# Patient Record
Sex: MALE | Race: WHITE
[De-identification: names, ages, dates, MRNs, and addresses within clinical notes are randomized per-mention and may not be internally consistent; named-entity substitution may affect disease eponyms.]

---

## 2018-05-05 ENCOUNTER — HOSPITAL ENCOUNTER (EMERGENCY)
Dept: HOSPITAL 7 - FB.ED | Age: 83
Discharge: HOME | End: 2018-05-05
Payer: MEDICARE

## 2018-05-05 VITALS — DIASTOLIC BLOOD PRESSURE: 73 MMHG | SYSTOLIC BLOOD PRESSURE: 141 MMHG

## 2018-05-05 DIAGNOSIS — J86.9: ICD-10-CM

## 2018-05-05 DIAGNOSIS — T82.7XXA: Primary | ICD-10-CM

## 2018-05-05 DIAGNOSIS — Z88.0: ICD-10-CM

## 2018-05-05 DIAGNOSIS — Z79.899: ICD-10-CM

## 2018-05-05 DIAGNOSIS — Z87.891: ICD-10-CM

## 2018-05-05 NOTE — EDM.PDOC
ED HPI GENERAL MEDICAL PROBLEM





- General


Chief Complaint: Skin Complaint


Stated Complaint: DRAINAGE ON CHEST


Time Seen by Provider: 05/05/18 11:40


Source of Information: Reports: Patient


History Limitations: Reports: No Limitations





- History of Present Illness


INITIAL COMMENTS - FREE TEXT/NARRATIVE: 





Alvin comes into [facility] ED with new onset drainage from a wound to R anterior 

chest. He had a Pacemaker/Defib placed in [place] about a month ago, and noticed 

some drainage this am on his shirt. There is no pain, tenderness or odor. 

Drainage appears blood tinged. Inspection reveals turbid blood tinged drainage 

from the corner of the wound, and a WC was obtained. 





- Related Data


 Allergies











Allergy/AdvReac Type Severity Reaction Status Date / Time


 


Penicillins Allergy  Hives Verified 05/05/18 11:10











Home Meds: 


 Home Meds





Aspirin [Ecotrin] 81 mg PO BEDTIME 05/27/15 [History]


Carvedilol 12.5 mg PO BID 05/27/15 [History]


Furosemide [Lasix] 20 mg PO DAILY #30 tablet 05/27/15 [Rx]


Isosorbide Mononitrate [Imdur] 30 mg PO DAILY 05/27/15 [History]


Losartan [Cozaar] 25 mg PO BEDTIME 05/27/15 [History]


Omega-3 Fatty Acids [Fish Oil] 2 tab PO BID 05/27/15 [History]


Rosuvastatin [Crestor] 20 mg PO BEDTIME 05/27/15 [History]


Spironolactone [Aldactone] 12.5 mg PO DAILY 05/27/15 [History]


Clindamycin HCl [Cleocin HCl] 300 mg PO Q6HR #20 capsule 05/05/18 [Rx]











Past Medical History





- Past Health History


Medical/Surgical History: Denies Medical/Surgical History


Other Genitourinary History: kidney disease





- Past Surgical History


Other Cardiovascular Surgeries/Procedures: 1 stent, triple bypass





Social & Family History





- Tobacco Use


Smoking Status *Q: Former Smoker


Years of Tobacco use: 28





- Alcohol Use


Days Per Week of Alcohol Use: 7


Number of Drinks Per Day: 1


Total Drinks Per Week: 7





- Recreational Drug Use


Recreational Drug Use: No





ED ROS GENERAL





- Review of Systems


Review Of Systems: ROS reveals no pertinent complaints other than HPI.





ED EXAM, SKIN/RASH


Exam: See Below


Exam Limited By: No Limitations


General Appearance: Alert, WD/WN, No Apparent Distress


Head: Normocephalic


Neck: Normal Inspection, Supple, Non-Tender, Full Range of Motion


Respiratory/Chest: No Respiratory Distress, Lungs Clear, Normal Breath Sounds, 

No Accessory Muscle Use, Chest Non-Tender, Other (Pacemaker wound R upper chest 

with a draining small wound from pocket, nontender, no erythema, additional 

turbid fluid was removed manually)


Cardiovascular: Normal Peripheral Pulses, Regular Rate, Rhythm, No Murmur


GI/Abdominal: Normal Bowel Sounds, Soft, Non-Tender, No Organomegaly, No 

Distention, No Mass


 (Male) Exam: Deferred


Rectal (Males) Exam: Deferred


Back Exam: Normal Inspection


Extremities: Normal Inspection


Neurological: Alert, Oriented, CN II-XII Intact, Normal Cognition, Normal Gait, 

No Motor/Sensory Deficits


Psychiatric: Normal Affect, Normal Mood


Skin: Warm, Dry, Wound/Incision (see annotation above)





Course





- Vital Signs


Text/Narrative:: 





Following manual removal of turbid blood tinged fluid from wound, a dressing 

was applied. 


Last Recorded V/S: 





 Last Vital Signs











Temp  36.1 C   05/05/18 11:12


 


Pulse  63   05/05/18 11:12


 


Resp  16   05/05/18 11:12


 


BP  141/73 H  05/05/18 11:12


 


Pulse Ox  99   05/05/18 11:12














Departure





- Departure


Time of Disposition: 12:13


Disposition: Home, Self-Care 01


Condition: Fair


Clinical Impression: 


 Abscess








- Discharge Information


Prescriptions: 


Clindamycin HCl [Cleocin HCl] 300 mg PO Q6HR #20 capsule


Referrals: 


Strand,Duane, MD [Primary Care Provider] - 





- Problem List & Annotations


(1) Abscess


SNOMED Code(s): 751811597


   Code(s): L02.91 - CUTANEOUS ABSCESS, UNSPECIFIED   Status: Acute   Current 

Visit: Yes   Annotation/Comment:: Mr Antunez has a small abscess involving the 

surgical pocket created for his new Pacemaker/Defib. He is 'allergic' to 

Augmentin. I dispensed Clindamycin 300mg cap q6 hrs, and advised a call to 

Cardiology on Monday for follow up. He may return to ED if any issues with 

swelling, tenderness, fever, or constitutional sxs related to antibx.    





- Problem List Review


Problem List Initiated/Reviewed/Updated: Yes





- Assessment/Plan


Plan: 





Follow up with Cardiology on Monday.

## 2018-09-04 ENCOUNTER — HOSPITAL ENCOUNTER (EMERGENCY)
Dept: HOSPITAL 7 - FB.ED | Age: 83
LOS: 1 days | Discharge: OTHER INSTITUTION | End: 2018-09-05
Payer: MEDICARE

## 2018-09-04 VITALS — DIASTOLIC BLOOD PRESSURE: 71 MMHG | SYSTOLIC BLOOD PRESSURE: 144 MMHG

## 2018-09-04 DIAGNOSIS — Z88.0: ICD-10-CM

## 2018-09-04 DIAGNOSIS — Z79.899: ICD-10-CM

## 2018-09-04 DIAGNOSIS — I21.4: Primary | ICD-10-CM

## 2018-09-04 PROCEDURE — 80048 BASIC METABOLIC PNL TOTAL CA: CPT

## 2018-09-04 PROCEDURE — 36415 COLL VENOUS BLD VENIPUNCTURE: CPT

## 2018-09-04 PROCEDURE — 71045 X-RAY EXAM CHEST 1 VIEW: CPT

## 2018-09-04 PROCEDURE — 85025 COMPLETE CBC W/AUTO DIFF WBC: CPT

## 2018-09-04 PROCEDURE — 93005 ELECTROCARDIOGRAM TRACING: CPT

## 2018-09-04 PROCEDURE — 84484 ASSAY OF TROPONIN QUANT: CPT

## 2018-09-04 PROCEDURE — 99285 EMERGENCY DEPT VISIT HI MDM: CPT

## 2018-09-04 PROCEDURE — 85610 PROTHROMBIN TIME: CPT

## 2018-09-04 RX ADMIN — Medication PRN ML: at 23:10

## 2018-09-04 NOTE — EDM.PDOC
ED HPI GENERAL MEDICAL PROBLEM





- General


Chief Complaint: Chest Pain


Stated Complaint: CHESTPAIN


Time Seen by Provider: 09/04/18 22:00


Source of Information: Reports: Patient


History Limitations: Reports: No Limitations





- History of Present Illness


INITIAL COMMENTS - FREE TEXT/NARRATIVE: 





Alvin comes into [facility] ED with a hx of L precordial chest pains for less than 

10 minutes that occurred about an hour ago while sitting in a chair watching 

TV. Pains were nonradiating, not associated with SOB, palpitations, sweats, 

dizziness, or GI upset. He has been pain free since arrival by car. He was 

given  mg po, and an IV was inserted. A 12 lead ekg notes paced rhythm 

without new changes. 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 He has 

taken no meds. 





- Related Data


 Allergies











Allergy/AdvReac Type Severity Reaction Status Date / Time


 


Penicillins Allergy  Hives Verified 09/04/18 21:53











Home Meds: 


 Home Meds





Carvedilol 12.5 mg PO BID 05/27/15 [History]


Furosemide [Lasix] 20 mg PO DAILY #30 tablet 05/27/15 [Rx]


Isosorbide Mononitrate [Imdur] 30 mg PO DAILY 05/27/15 [History]


Losartan [Cozaar] 25 mg PO BEDTIME 05/27/15 [History]


Omega-3 Fatty Acids [Fish Oil] 2 tab PO BID 05/27/15 [History]


Rosuvastatin [Crestor] 20 mg PO BEDTIME 05/27/15 [History]


Warfarin Sodium [Jantoven] 5 mg PO DAILY 09/04/18 [History]


Zolpidem [Ambien] 2.5 mg PO BEDTIME 09/04/18 [History]


traMADol HCl [Tramadol HCl] 50 mg PO BEDTIME 09/04/18 [History]











Past Medical History





- Past Health History


Medical/Surgical History: Denies Medical/Surgical History


HEENT History: Reports: Impaired Vision


Other HEENT History: wears glasses


Cardiovascular History: Reports: Heart Failure, High Cholesterol, Hypertension, 

MI


Other Genitourinary History: kidney disease





- Infectious Disease History


Infectious Disease History: Reports: Chicken Pox, Measles, Mumps





- Past Surgical History


Other Cardiovascular Surgeries/Procedures: 1 stent, triple bypass





Social & Family History





- Family History


Family Medical History: Noncontributory





- Caffeine Use


Caffeine Use: Reports: Coffee





ED ROS GENERAL





- Review of Systems


Review Of Systems: See Below


Constitutional: Reports: No Symptoms


HEENT: Reports: No Symptoms


Respiratory: Reports: No Symptoms


Cardiovascular: Reports: Chest Pain


Endocrine: Reports: No Symptoms


GI/Abdominal: Reports: No Symptoms


: Reports: No Symptoms


Musculoskeletal: Reports: No Symptoms


Skin: Reports: No Symptoms


Neurological: Reports: No Symptoms


Psychiatric: Reports: No Symptoms


Hematologic/Lymphatic: Reports: No Symptoms


Immunologic: Reports: No Symptoms





ED EXAM, GENERAL





- Physical Exam


Exam: See Below


Exam Limited By: No Limitations


General Appearance: Alert, WD/WN, No Apparent Distress


Eye Exam: Bilateral Eye: EOMI, Normal Inspection, PERRL


Ears: Normal External Exam


Nose: Normal Inspection


Throat/Mouth: Normal Inspection, Normal Lips, Normal Teeth, Normal Gums, Normal 

Oropharynx, Normal Voice, No Airway Compromise


Head: Normocephalic


Neck: Normal Inspection, Supple, Non-Tender, Full Range of Motion


Respiratory/Chest: No Respiratory Distress, Lungs Clear, Normal Breath Sounds, 

No Accessory Muscle Use, Chest Non-Tender


Cardiovascular: Regular Rate, Rhythm, No Edema, No Gallop, No JVD, No Murmur


GI/Abdominal: Normal Bowel Sounds, Soft, Non-Tender, No Organomegaly, No 

Distention, No Mass


 (Male) Exam: Deferred


Rectal (Males) Exam: Deferred


Back Exam: Normal Inspection


Extremities: Normal Inspection


Neurological: Alert, Oriented, CN II-XII Intact, Normal Cognition, Normal Gait, 

No Motor/Sensory Deficits


Psychiatric: Normal Affect, Normal Mood


Skin Exam: Warm, Dry, Intact, Normal Color, No Rash


Lymphatic: No Adenopathy





Course





- Vital Signs


Text/Narrative:: 





Following assessment at the [facility] ED, an IV was started in the LUE. A 12 lead 

ekg noted paced rhythm,  mg po, port chest x ray noting some cardiac 

enlargement, pacemaker, no active infiltrates. The CBC was baseline, Troponin I 

0.062, BUN 28, Cr 1.0. I discussed case with Dr Cooney at [facility] who 

accepted care of the patient. 


Last Recorded V/S: 


 Last Vital Signs











Temp      


 


Pulse  60   09/04/18 21:48


 


Resp  15   09/04/18 21:48


 


BP  127/59 L  09/04/18 21:48


 


Pulse Ox  100   09/04/18 21:48














- Orders/Labs/Meds


Orders: 


 Active Orders 24 hr











 Category Date Time Status


 


 EKG Documentation Completion [RC] ASDIRECTED Care  09/04/18 22:03 Active


 


 Chest 1V Frontal [CR] Stat Exams  09/04/18 22:03 Taken


 


 Sodium Chloride 0.9% [Saline Flush] Med  09/04/18 23:02 Active





 10 ml FLUSH ASDIRECTED PRN   


 


 Peripheral IV Insertion Adult [OM.PC] Routine Oth  09/04/18 23:02 Ordered


 


 EKG 12 Lead [EK] Routine Ther  09/04/18 22:03 Ordered








 Medication Orders





Sodium Chloride (Saline Flush)  10 ml FLUSH ASDIRECTED PRN


   PRN Reason: Keep Vein Open


   Last Admin: 09/04/18 23:10 Dose:  10 ml








Labs: 


 Laboratory Tests











  09/04/18 09/04/18 09/04/18 Range/Units





  22:03 22:10 22:10 


 


WBC   5.9   (4.5-12.0)  X10-3/uL


 


RBC   4.04 L   (4.30-5.75)  x10(6)uL


 


Hgb   13.5   (11.5-15.5)  g/dL


 


Hct   39.5   (30.0-51.3)  %


 


MCV   97.8 H   (80-96)  fL


 


MCH   33.4   (27.7-33.6)  pg


 


MCHC   34.2   (32.2-35.4)  g/dL


 


RDW   14.4   (11.5-15.5)  %


 


Plt Count   151   (125-369)  X10(3)uL


 


MPV   8.5   (7.4-10.4)  fL


 


Neut % (Auto)   69.1   (46-82)  %


 


Lymph % (Auto)   20.4   (13-37)  %


 


Mono % (Auto)   6.9   (4-12)  %


 


Eos % (Auto)   3   (1.0-5.0)  %


 


Baso % (Auto)   1   (0-2)  %


 


Neut # (Auto)   4.1   (1.6-8.3)  #


 


Lymph # (Auto)   1.2   (0.6-5.0)  #


 


Mono # (Auto)   0.4   (0.0-1.3)  #


 


Eos # (Auto)   0.2   (0.0-0.8)  #


 


Baso # (Auto)   0.0   (0.0-0.2)  #


 


Sodium    136  (135-145)  mmol/L


 


Potassium    4.1  (3.5-5.3)  mmol/L


 


Chloride    103  (100-110)  mmol/L


 


Carbon Dioxide    27  (21-32)  mmol/L


 


BUN    28 H  (7-18)  mg/dL


 


Creatinine    1.0  (0.70-1.30)  mg/dL


 


Est Cr Clr Drug Dosing    53.20  mL/min


 


Estimated GFR (MDRD)    > 60  (>60)  


 


BUN/Creatinine Ratio    28.0 H  (9-20)  


 


Glucose    133 H  ()  mg/dL


 


Calcium    8.5 L  (8.6-10.2)  mg/dL


 


Troponin I  0.062 H    (<0.017-0.056)  ng/mL











Meds: 


Medications











Generic Name Dose Route Start Last Admin





  Trade Name Freq  PRN Reason Stop Dose Admin


 


Sodium Chloride  10 ml  09/04/18 23:02  09/04/18 23:10





  Saline Flush  FLUSH   10 ml





  ASDIRECTED PRN   Administration





  Keep Vein Open   





     





     





     














Discontinued Medications














Generic Name Dose Route Start Last Admin





  Trade Name Freq  PRN Reason Stop Dose Admin


 


Aspirin  324 mg  09/04/18 23:04  09/04/18 23:10





  Aspirin  PO  09/04/18 23:05  324 mg





  ONETIME ONE   Administration





     





     





     





     














Departure





- Departure


Time of Disposition: 23:25


Disposition: DC/Tfer to Other 70


Reason for Transfer *Q: Primary PCI Indicated


Condition: Fair


Clinical Impression: 


 Non-STEMI (non-ST elevated myocardial infarction)





Referrals: 


Strand,Duane, MD [Primary Care Provider] - 


Forms:  ED Department Discharge





- Problem List & Annotations


(1) Non-STEMI (non-ST elevated myocardial infarction)


SNOMED Code(s): 134137942


   Code(s): I21.4 - NON-ST ELEVATION (NSTEMI) MYOCARDIAL INFARCTION   Status: 

Acute   Current Visit: Yes   Annotation/Comment:: Mr Manriquez will be 

transferred to [facility] by ground ambulance tonight.    





- Problem List Review


Problem List Initiated/Reviewed/Updated: Yes





- My Orders


Last 24 Hours: 


My Active Orders





09/04/18 22:03


EKG Documentation Completion [RC] ASDIRECTED 


Chest 1V Frontal [CR] Stat 


EKG 12 Lead [EK] Routine 





09/04/18 23:02


Sodium Chloride 0.9% [Saline Flush]   10 ml FLUSH ASDIRECTED PRN 


Peripheral IV Insertion Adult [OM.PC] Routine 














- Assessment/Plan


Last 24 Hours: 


My Active Orders





09/04/18 22:03


EKG Documentation Completion [RC] ASDIRECTED 


Chest 1V Frontal [CR] Stat 


EKG 12 Lead [EK] Routine 





09/04/18 23:02


Sodium Chloride 0.9% [Saline Flush]   10 ml FLUSH ASDIRECTED PRN 


Peripheral IV Insertion Adult [OM.PC] Routine 











Plan: 





Follow up with Cardiology.

## 2018-09-05 NOTE — CR
INDICATION:  Atypical chest pain.



CHEST:  An AP portable upright view of the chest, 09/04/2018, was compared with 
05/27/2015 and again revealed the heart to be enlarged with bipolar pacemaker 
leads unchanged in position.  



The aorta is tortuous and calcified.  Evidence of previous median sternotomy is 
again noted.  



Diminished bone density is noted, compatible with osteoporosis.  



Markings appear similar to the previous examination with no definite active 
infiltrate or effusion.  However, minimal pneumonia and pleuritis cannot be 
excluded at the left costophrenic angle with increased density in that area.  



Overlying EKG leads are noted. 



Lungs appear to be somewhat hyperaerated with mildly flattened diaphragm leaf, 
raising question of COPD.   



Upper lung field pulmonary vasculature is minimally prominent and somewhat 
indistinct, raising question of a mild or early CHF.  This should be correlated 
clinically - PA and lateral views of the chest with full inspiration may be 
helpful for further evaluation.  



IMPRESSION:

1.  No definite acute process but difficult to exclude pneumonia and pleuritis 
at the left costophrenic angle.  

2.  Minimal or early CHF is difficult to exclude but not definite.  

3.  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 
sternotomy.  

MTDD

## 2018-09-20 ENCOUNTER — HOSPITAL ENCOUNTER (EMERGENCY)
Dept: HOSPITAL 7 - FB.ED | Age: 83
Discharge: HOME | End: 2018-09-20
Payer: MEDICARE

## 2018-09-20 VITALS — DIASTOLIC BLOOD PRESSURE: 70 MMHG | SYSTOLIC BLOOD PRESSURE: 123 MMHG

## 2018-09-20 DIAGNOSIS — I50.9: ICD-10-CM

## 2018-09-20 DIAGNOSIS — Z95.0: ICD-10-CM

## 2018-09-20 DIAGNOSIS — Z88.0: ICD-10-CM

## 2018-09-20 DIAGNOSIS — Z79.01: ICD-10-CM

## 2018-09-20 DIAGNOSIS — E78.00: ICD-10-CM

## 2018-09-20 DIAGNOSIS — T45.515A: ICD-10-CM

## 2018-09-20 DIAGNOSIS — I11.0: Primary | ICD-10-CM

## 2018-09-20 DIAGNOSIS — Z79.899: ICD-10-CM

## 2018-09-20 DIAGNOSIS — D68.32: ICD-10-CM

## 2018-09-20 DIAGNOSIS — Z87.891: ICD-10-CM

## 2018-09-20 NOTE — EDM.PDOC
ED HPI GENERAL MEDICAL PROBLEM





- General


Chief Complaint: Respiratory Problem


Stated Complaint: SOB


Time Seen by Provider: 09/20/18 10:36


Source of Information: Reports: Patient


History Limitations: Reports: No Limitations





- History of Present Illness


INITIAL COMMENTS - FREE TEXT/NARRATIVE: 





Presents with exertional dyspnea x 1 month. Has a h/o heart failure, took an 

extra Lasix this morning (40mg total). Patient had a cardiac work up at [facility] @2 weeks ago for NSTEMI, negative coronary angio per patient. Coreg was 

increased. Denies chest pain. Patient called [facility] this morning 

because exertional SOB has not improved and they referred him here. Patient 

states symptoms have not worsened. No dyspnea at rest.


Per [facility] med records:


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 

to LAD, SVG to OM1, SVG to PDA), patent stent in LM to LCX.


Transthoracic Echocardiogram (9/5/18): EF decreased from 40% to 35%, PAH 

increased from 24mm Hg to 50mm Hg (compared to 6/4/15 Echo).


Duration: Week(s): (4)


Severity: Moderate


Associated Symptoms: Denies: Chest Pain, Cough





- Related Data


 Allergies











Allergy/AdvReac Type Severity Reaction Status Date / Time


 


Penicillins Allergy  Hives Verified 09/20/18 10:30











Home Meds: 


 Home Meds





Carvedilol 12.5 mg PO BID 05/27/15 [History]


Furosemide [Lasix] 20 mg PO DAILY #30 tablet 05/27/15 [Rx]


Isosorbide Mononitrate [Imdur] 30 mg PO DAILY 05/27/15 [History]


Omega-3 Fatty Acids [Fish Oil] 2 tab PO BID 05/27/15 [History]


Rosuvastatin [Crestor] 20 mg PO BEDTIME 05/27/15 [History]


Warfarin Sodium [Jantoven] 5 mg PO SUTUWETHSA 09/04/18 [History]


Zolpidem [Ambien] 2.5 mg PO BEDTIME 09/04/18 [History]


traMADol HCl [Tramadol HCl] 50 mg PO BEDTIME 09/04/18 [History]


Furosemide 20 mg PO DAILY PRN 09/20/18 [History]


Warfarin Sodium [Jantoven] 2.5 mg PO MOFR 09/20/18 [History]











Past Medical History


HEENT History: Reports: Impaired Vision


Other HEENT History: wears glasses


Cardiovascular History: Reports: Automatic Implantable Cardioverter 

Defibrillators, Bypass (3V CABG 1980's), Heart Failure, High Cholesterol, 

Hypertension, MI, Pacemaker


Other Genitourinary History: kidney disease





- Infectious Disease History


Infectious Disease History: Reports: Chicken Pox, Measles, Mumps





- Past Surgical History


Other Cardiovascular Surgeries/Procedures: 1 stent, triple bypass





Social & Family History





- Family History


Family Medical History: Noncontributory





- Tobacco Use


Smoking Status *Q: Former Smoker


Tobacco Use Within Last Twelve Months: No





- Caffeine Use


Caffeine Use: Reports: Coffee





- Alcohol Use


Alcohol Use History: Yes


Alcohol Use Frequency: Socially





ED ROS GENERAL





- Review of Systems


Review Of Systems: ROS reveals no pertinent complaints other than HPI.





ED EXAM, GENERAL





- Physical Exam


Exam: See Below


Exam Limited By: No Limitations


General Appearance: Alert, WD/WN, No Apparent Distress


Ears: Normal External Exam


Nose: Normal Inspection


Throat/Mouth: No Airway Compromise


Head: Atraumatic, Normocephalic


Neck: Full Range of Motion


Respiratory/Chest: No Respiratory Distress, Lungs Clear, Normal Breath Sounds


Cardiovascular: Regular Rate, Rhythm, No Murmur


GI/Abdominal: No Distention


Back Exam: Full Range of Motion


Extremities: Normal Range of Motion


Neurological: Alert, Oriented, Normal Cognition, No Motor/Sensory Deficits


Psychiatric: Normal Affect, Normal Mood


Skin Exam: Warm, Dry, Intact





EKG INTERPRETATION


EKG Date: 09/20/18


Time: 10:39


Rhythm: Other (paced)


Rate (Beats/Min): 75


EKG Interpretation Comments: 





Ventricular-paced rhythm





Course





- Vital Signs


Last Recorded V/S: 


 Last Vital Signs











Temp  36.3 C   09/20/18 10:03


 


Pulse  66   09/20/18 12:02


 


Resp  16   09/20/18 12:02


 


BP  139/71   09/20/18 12:02


 


Pulse Ox  99   09/20/18 12:02














- Orders/Labs/Meds


Orders: 


 Active Orders 24 hr











 Category Date Time Status


 


 CXR [Chest 2V] [CR] Stat Exams  09/20/18 10:34 Taken


 


 EKG 12 Lead [EK] Stat Ther  09/20/18 10:34 Ordered











Labs: 


 Laboratory Tests











  09/20/18 09/20/18 09/20/18 Range/Units





  10:44 10:44 10:44 


 


WBC  7.1    (4.5-12.0)  X10-3/uL


 


RBC  3.90 L    (4.30-5.75)  x10(6)uL


 


Hgb  12.9    (11.5-15.5)  g/dL


 


Hct  38.5    (30.0-51.3)  %


 


MCV  98.6 H    (80-96)  fL


 


MCH  33.0    (27.7-33.6)  pg


 


MCHC  33.4    (32.2-35.4)  g/dL


 


RDW  14.0    (11.5-15.5)  %


 


Plt Count  217    (125-369)  X10(3)uL


 


MPV  8.1    (7.4-10.4)  fL


 


Neut % (Auto)  78.2    (46-82)  %


 


Lymph % (Auto)  11.6 L    (13-37)  %


 


Mono % (Auto)  8.2    (4-12)  %


 


Eos % (Auto)  2    (1.0-5.0)  %


 


Baso % (Auto)  0    (0-2)  %


 


Neut # (Auto)  5.6    (1.6-8.3)  #


 


Lymph # (Auto)  0.8    (0.6-5.0)  #


 


Mono # (Auto)  0.6    (0.0-1.3)  #


 


Eos # (Auto)  0.1    (0.0-0.8)  #


 


Baso # (Auto)  0.0    (0.0-0.2)  #


 


PT   37.5 H*   (8.7-11.1)  


 


INR   3.92 H   (0.89-1.13)  


 


D-Dimer, Quantitative     (0.0-0.59)  mg/LFEU


 


Sodium    137  (135-145)  mmol/L


 


Potassium    3.9  (3.5-5.3)  mmol/L


 


Chloride    101  (100-110)  mmol/L


 


Carbon Dioxide    30  (21-32)  mmol/L


 


BUN    23 H  (7-18)  mg/dL


 


Creatinine    1.1  (0.70-1.30)  mg/dL


 


Est Cr Clr Drug Dosing    48.36  mL/min


 


Estimated GFR (MDRD)    > 60  (>60)  


 


BUN/Creatinine Ratio    20.9 H  (9-20)  


 


Glucose    98  ()  mg/dL


 


Calcium    8.4 L  (8.6-10.2)  mg/dL


 


Total Bilirubin    0.7  (0.1-1.3)  mg/dL


 


AST    26 H  (5-25)  IU/L


 


ALT    48 H  (12-36)  U/L


 


Alkaline Phosphatase    91  ()  IU/L


 


Troponin I     (<0.017-0.056)  ng/mL


 


NT-Pro-B Natriuret Pep     (<=450)  pg/mL


 


Total Protein    6.3  (6.0-8.0)  g/dL


 


Albumin    3.0 L  (3.2-4.6)  g/dL


 


Globulin    3.3  g/dL


 


Albumin/Globulin Ratio    0.9  














  09/20/18 09/20/18 09/20/18 Range/Units





  10:44 10:44 12:43 


 


WBC     (4.5-12.0)  X10-3/uL


 


RBC     (4.30-5.75)  x10(6)uL


 


Hgb     (11.5-15.5)  g/dL


 


Hct     (30.0-51.3)  %


 


MCV     (80-96)  fL


 


MCH     (27.7-33.6)  pg


 


MCHC     (32.2-35.4)  g/dL


 


RDW     (11.5-15.5)  %


 


Plt Count     (125-369)  X10(3)uL


 


MPV     (7.4-10.4)  fL


 


Neut % (Auto)     (46-82)  %


 


Lymph % (Auto)     (13-37)  %


 


Mono % (Auto)     (4-12)  %


 


Eos % (Auto)     (1.0-5.0)  %


 


Baso % (Auto)     (0-2)  %


 


Neut # (Auto)     (1.6-8.3)  #


 


Lymph # (Auto)     (0.6-5.0)  #


 


Mono # (Auto)     (0.0-1.3)  #


 


Eos # (Auto)     (0.0-0.8)  #


 


Baso # (Auto)     (0.0-0.2)  #


 


PT     (8.7-11.1)  


 


INR     (0.89-1.13)  


 


D-Dimer, Quantitative   0.84 H   (0.0-0.59)  mg/LFEU


 


Sodium     (135-145)  mmol/L


 


Potassium     (3.5-5.3)  mmol/L


 


Chloride     (100-110)  mmol/L


 


Carbon Dioxide     (21-32)  mmol/L


 


BUN     (7-18)  mg/dL


 


Creatinine     (0.70-1.30)  mg/dL


 


Est Cr Clr Drug Dosing     mL/min


 


Estimated GFR (MDRD)     (>60)  


 


BUN/Creatinine Ratio     (9-20)  


 


Glucose     ()  mg/dL


 


Calcium     (8.6-10.2)  mg/dL


 


Total Bilirubin     (0.1-1.3)  mg/dL


 


AST     (5-25)  IU/L


 


ALT     (12-36)  U/L


 


Alkaline Phosphatase     ()  IU/L


 


Troponin I  0.100 H*   0.097 H*  (<0.017-0.056)  ng/mL


 


NT-Pro-B Natriuret Pep  4156 H*    (<=450)  pg/mL


 


Total Protein     (6.0-8.0)  g/dL


 


Albumin     (3.2-4.6)  g/dL


 


Globulin     g/dL


 


Albumin/Globulin Ratio     














- Radiology Interpretation


Free Text/Narrative:: 





CXR: New mild linear density in the left lateral lower lung, probably 

atelectasis





- Re-Assessments/Exams


Free Text/Narrative Re-Assessment/Exam: 





09/20/18 13:31


Patient feels better.





Departure





- Departure


Time of Disposition: 13:31


Disposition: Home, Self-Care 01


Condition: Good


Clinical Impression: 


 Warfarin-induced coagulopathy





CHF exacerbation


Qualifiers:


 Heart failure type: unspecified Qualified Code(s): I50.9 - Heart failure, 

unspecified








- Discharge Information


*PRESCRIPTION DRUG MONITORING PROGRAM REVIEWED*: No


*COPY OF PRESCRIPTION DRUG MONITORING REPORT IN PATIENT BRENDAN: Not Applicable


Instructions:  Warfarin Coagulopathy, Heart Failure, Easy-to-Read


Referrals: 


Strand,Duane, MD [Primary Care Provider] - 


Forms:  ED Department Discharge


Additional Instructions: 


Hold Coumadin today, call Coumadin clinic today for further dosing 

instructions. Increase Lasix to 40mg daily. Follow up with your primary 

physician in 1-2 days. Return to the ER as needed.





- My Orders


Last 24 Hours: 


My Active Orders





09/20/18 10:34


CXR [Chest 2V] [CR] Stat 


EKG 12 Lead [EK] Stat 














- Assessment/Plan


Last 24 Hours: 


My Active Orders





09/20/18 10:34


CXR [Chest 2V] [CR] Stat 


EKG 12 Lead [EK] Stat

## 2019-09-23 ENCOUNTER — HOSPITAL ENCOUNTER (INPATIENT)
Dept: HOSPITAL 7 - FB.MS | Age: 84
LOS: 3 days | Discharge: HOME HEALTH | DRG: 559 | End: 2019-09-26
Attending: FAMILY MEDICINE | Admitting: FAMILY MEDICINE
Payer: MEDICARE

## 2019-09-23 DIAGNOSIS — I95.81: ICD-10-CM

## 2019-09-23 DIAGNOSIS — I25.10: ICD-10-CM

## 2019-09-23 DIAGNOSIS — I25.5: ICD-10-CM

## 2019-09-23 DIAGNOSIS — Z95.5: ICD-10-CM

## 2019-09-23 DIAGNOSIS — Z95.1: ICD-10-CM

## 2019-09-23 DIAGNOSIS — Z96.641: ICD-10-CM

## 2019-09-23 DIAGNOSIS — I13.0: ICD-10-CM

## 2019-09-23 DIAGNOSIS — J44.9: ICD-10-CM

## 2019-09-23 DIAGNOSIS — Z90.89: ICD-10-CM

## 2019-09-23 DIAGNOSIS — E78.5: ICD-10-CM

## 2019-09-23 DIAGNOSIS — Z87.891: ICD-10-CM

## 2019-09-23 DIAGNOSIS — R53.1: ICD-10-CM

## 2019-09-23 DIAGNOSIS — N18.3: ICD-10-CM

## 2019-09-23 DIAGNOSIS — D62: ICD-10-CM

## 2019-09-23 DIAGNOSIS — I50.41: ICD-10-CM

## 2019-09-23 DIAGNOSIS — Z47.1: Primary | ICD-10-CM

## 2019-09-23 DIAGNOSIS — Z79.899: ICD-10-CM

## 2019-09-23 DIAGNOSIS — Z88.1: ICD-10-CM

## 2019-09-23 DIAGNOSIS — Z95.810: ICD-10-CM

## 2019-09-23 DIAGNOSIS — Z79.82: ICD-10-CM

## 2019-09-23 DIAGNOSIS — I25.2: ICD-10-CM

## 2019-09-23 DIAGNOSIS — I48.2: ICD-10-CM

## 2019-09-23 DIAGNOSIS — Z88.8: ICD-10-CM

## 2019-09-23 DIAGNOSIS — E78.00: ICD-10-CM

## 2019-09-23 DIAGNOSIS — H54.7: ICD-10-CM

## 2019-09-23 DIAGNOSIS — Z90.49: ICD-10-CM

## 2019-09-23 DIAGNOSIS — Z79.01: ICD-10-CM

## 2019-09-23 RX ADMIN — HEPARIN SODIUM SCH UNITS: 5000 INJECTION, SOLUTION INTRAVENOUS; SUBCUTANEOUS at 17:31

## 2019-09-23 RX ADMIN — OMEGA-3 FATTY ACIDS CAP 1000 MG SCH GM: 1000 CAP at 21:24

## 2019-09-23 NOTE — PCM.HP.2
H&P History of Present Illness





- General


Date of Service: 09/23/19


Admit Problem/Dx: 


 Admission Diagnosis/Problem





Admission Diagnosis/Problem      Stroke occurring within last month








Source of Information: Patient, Old Records


History Limitations: Reports: No Limitations





- History of Present Illness


Initial Comments - Free Text/Narative: 


Alvin is an 85-year-old male who was transferred from [facility] for PT. 

He had right hip arthroplasty on 18 September, suffered postoperative 

hypotension,anemia but recovered well. Alvin however has been generally weak 

and is being admitted for pain control;physical rehabilitation. He denies any 

chest pain or shortness of breath or fever. He has a history of coronary disease

, HTN CHF,COPD,AICD,hypertension and hyperlipidemia the previously stable.


  ** Right Hip


Pain Score (Numeric/FACES): 3





- Related Data


Allergies/Adverse Reactions: 


 Allergies











Allergy/AdvReac Type Severity Reaction Status Date / Time


 


amoxicillin [From Augmentin] Allergy  Abdominal Verified 09/23/19 15:22





   Pain  


 


clavulanic acid Allergy  Abdominal Verified 09/23/19 15:22





[From Augmentin]   Pain  


 


simvastatin [From Zocor] Allergy  Other Verified 09/23/19 15:23











Home Medications: 


 Home Meds





Omega-3 Fatty Acids [Fish Oil] 2,000 mg PO BID 05/27/15 [History]


Rosuvastatin [Crestor] 20 mg PO BEDTIME 05/27/15 [History]


Zolpidem [Ambien] 5 mg PO BEDTIME PRN 09/04/18 [History]


Aspirin [Ecotrin EC] 81 mg PO BEDTIME 11/22/18 [History]


Nitroglycerin [Nitrostat] 0.4 mg SL Q5M PRN 11/27/18 [History]


Ubidecarenone [Coenzyme Q10] 100 mg PO BEDTIME 11/27/18 [History]


Acetaminophen [Tylenol] 650 mg PO Q4H PRN 09/23/19 [History]


Bumetanide 2 mg PO DAILY 09/23/19 [History]


Carvedilol 12.5 mg PO BID 09/23/19 [History]


Gabapentin [Neurontin] 100 mg PO BID 09/23/19 [History]


Isosorbide Mononitrate [Imdur] 60 mg PO DAILY 09/23/19 [History]


Sennosides/Docusate Sodium [Senna-S] 2 tab PO BID PRN 09/23/19 [History]


Warfarin [Coumadin] 2.5 mg PO DAILY@1600 09/23/19 [History]


oxyCODONE 5 - 10 mg PO Q3H PRN 09/23/19 [History]











Past Medical History





- Past Health History


Medical/Surgical History: Denies Medical/Surgical History


HEENT History: Reports: Impaired Vision


Other HEENT History: wears glasses


Cardiovascular History: Reports: Automatic Implantable Cardioverter 

Defibrillators, Blood Clots/VTE/DVT, Bypass, CAD, Heart Failure, High 

Cholesterol, Hypertension, MI


Respiratory History: Reports: SOB


Gastrointestinal History: Reports: None


Genitourinary History: Reports: Renal Disease


OB/GYN History: Reports: None


Musculoskeletal History: Reports: Arthritis


Neurological History: Reports: None


Psychiatric History: Reports: None


Endocrine/Metabolic History: Reports: None


Hematologic History: Reports: None


Immunologic History: Reports: None


Oncologic (Cancer) History: Reports: None


Dermatologic History: Reports: None





- Infectious Disease History


Infectious Disease History: Reports: C-Difficile, Measles, Shingles





- Past Surgical History


Head Surgeries/Procedures: Reports: None


HEENT Surgical History: Reports: Adenoidectomy, Tonsillectomy


Cardiovascular Surgical History: Reports: Coronary Artery Bypass, Coronary 

Artery Stent


Other Cardiovascular Surgeries/Procedures: 1 stent, triple bypass


GI Surgical History: Reports: Appendectomy, Colonoscopy, Hernia, Inguinal


Male  Surgical History: Reports: None


Musculoskeletal Surgical History: Reports: Arthroscopic Knee, Hip Replacement, 

Other (See Below)


Other Musculoskeletal Surgeries/Procedures:: bilat hip replacement





Social & Family History





- Family History


Family Medical History: Noncontributory


GI: Reports: None





- Tobacco Use


Smoking Status *Q: Former Smoker


Years of Tobacco use: 25


Used Tobacco, but Quit: Yes


Month/Year Tobacco Last Used: 38 years old


Second Hand Smoke Exposure: No





- Caffeine Use


Caffeine Use: Reports: Coffee


Caffeine Use Comment: Daily





- Recreational Drug Use


Recreational Drug Use: No





H&P Review of Systems





- Review of Systems:


Review Of Systems: ROS reveals no pertinent complaints other than HPI.





Exam





- Exam


Exam: See Below





- Vital Signs


Vital Signs: 


 Last Vital Signs











Temp  97.8 F   09/23/19 15:35


 


Pulse  65   09/23/19 15:35


 


Resp  18   09/23/19 15:35


 


BP  139/65   09/23/19 15:35


 


Pulse Ox  95   09/23/19 15:35











Weight: 95.617 kg





- Exam


General: Alert


HEENT: PERRLA


Neck: Supple


Lungs: Clear to Auscultation


Cardiovascular: Regular Rate, Systolic Murmur


GI/Abdominal Exam: Normal Bowel Sounds


Back Exam: Normal Inspection


Extremities: Normal Inspection


Skin: Warm


Neurological: Cranial Nerves Intact, Clonus


Neuro Extensive - Motor, Sensory, Reflexes: CN II-XII Intact





- Problem List


(1) History of total right hip arthroplasty


SNOMED Code(s): 449731967858


   ICD Code: Z96.641 - PRESENCE OF RIGHT ARTIFICIAL HIP JOINT   Status: Acute   

Current Visit: Yes   





(2) CAD (coronary artery disease)


SNOMED Code(s): 21176095


   ICD Code: I25.10 - ATHSCL HEART DISEASE OF NATIVE CORONARY ARTERY W/O ANG 

PCTRS   Status: Chronic   Current Visit: Yes   


Qualifiers: 


   Coronary Disease-Associated Artery/Lesion type: bypass graft   Associated 

angina: without angina 





(3) HTN (hypertension)


SNOMED Code(s): 43464460


   ICD Code: I10 - ESSENTIAL (PRIMARY) HYPERTENSION   Status: Chronic   Current 

Visit: Yes   


Qualifiers: 


   Hypertension type: essential hypertension   Qualified Code(s): I10 - 

Essential (primary) hypertension   





(4) Weakness


SNOMED Code(s): 75276367


   ICD Code: R53.1 - WEAKNESS   Status: Acute   Current Visit: Yes   





(5) Cardiac pacemaker in situ


SNOMED Code(s): 185380788


   ICD Code: Z95.0 - PRESENCE OF CARDIAC PACEMAKER   Status: Chronic   Current 

Visit: Yes   





(6) CHF (congestive heart failure)


SNOMED Code(s): 94292075


   ICD Code: I50.9 - HEART FAILURE, UNSPECIFIED   Status: Acute   Current Visit

: Yes   


Qualifiers: 


   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 





(7) COPD (chronic obstructive pulmonary disease)


SNOMED Code(s): 53244547


   ICD Code: J44.9 - CHRONIC OBSTRUCTIVE PULMONARY DISEASE, UNSPECIFIED   Status

: Chronic   Current Visit: Yes   


Qualifiers: 


   Chronic bronchitis type: unspecified 





(8) CKD (chronic kidney disease)


SNOMED Code(s): 794432816


   ICD Code: N18.9 - CHRONIC KIDNEY DISEASE, UNSPECIFIED   Status: Acute   

Current Visit: Yes   


Qualifiers: 


   Chronic kidney disease stage: stage 3 (moderate)   Qualified Code(s): N18.3 

- Chronic kidney disease, stage 3 (moderate)   


Problem List Initiated/Reviewed/Updated: Yes


Orders Last 24hrs: 


 Active Orders 24 hr











 Category Date Time Status


 


 Patient Status [ADT] Routine ADT  09/23/19 15:35 Active


 


 Height and Weight [RC] MO Care  09/23/19 15:35 Active


 


 Oxygen Therapy [RC] PRN Care  09/23/19 15:35 Active


 


 Up With Assistance [RC] ASDIRECTED Care  09/23/19 15:35 Active


 


 VTE/DVT Education [RC] Per Unit Routine Care  09/23/19 15:35 Active


 


 Vital Signs [RC] PER UNIT ROUTINE Care  09/23/19 15:35 Active


 


 OT Evaluation and Treatment [CONS] Routine Cons  09/23/19 15:35 Active


 


 PT Evaluation and Treatment [CONS] Routine Cons  09/23/19 15:35 Active


 


 Heart Healthy Diet [DIET] Diet  09/23/19 Dinner Active


 


 INR,PT,PROTHROMBIN TIME [COAG] DAILY Lab  09/24/19 06:00 Ordered


 


 INR,PT,PROTHROMBIN TIME [COAG] DAILY Lab  09/25/19 06:00 Ordered


 


 INR,PT,PROTHROMBIN TIME [COAG] DAILY Lab  09/26/19 06:00 Ordered


 


 INR,PT,PROTHROMBIN TIME [COAG] DAILY Lab  09/27/19 06:00 Ordered


 


 INR,PT,PROTHROMBIN TIME [COAG] DAILY Lab  09/28/19 06:00 Ordered


 


 Acetaminophen [Tylenol] Med  09/23/19 15:38 Active





 650 mg PO Q4H PRN   


 


 Aspirin [Halfprin] Med  09/23/19 21:00 Active





 81 mg PO BEDTIME   


 


 Bumetanide [Bumex] Med  09/24/19 09:00 Active





 2 mg PO DAILY   


 


 Carvedilol [Coreg] Med  09/23/19 21:00 Active





 12.5 mg PO BID   


 


 Docusate Sodium/Sennosides [Senna Plus] Med  09/23/19 15:38 Active





 2 tab PO BID PRN   


 


 Fish Oil/Omega-3 Fatty Acids [Fish Oil] Med  09/23/19 21:00 Active





 2 gm PO BID   


 


 Gabapentin [Neurontin] Med  09/23/19 21:00 Active





 100 mg PO BID   


 


 Heparin Sodium Med  09/23/19 17:00 Active





 5,000 units SUBCUT Q8H   


 


 Isosorbide Mononitrate [Imdur] Med  09/24/19 09:00 Active





 60 mg PO DAILY   


 


 Nitroglycerin [Nitrostat] Med  09/23/19 15:38 Active





 0.4 mg SL Q5M PRN   


 


 Rosuvastatin [Crestor] Med  09/23/19 21:00 Active





 20 mg PO BEDTIME   


 


 Ubidecarenone [Coenzyme Q10] Med  09/23/19 21:00 Active





 100 mg PO BEDTIME   


 


 Warfarin Sliding Scale [Coumadin Sliding Scale] Med  09/23/19 17:00 Pending





 1 each PO ASDIRECTED   


 


 Warfarin [Coumadin] Med  09/23/19 16:00 Active





 2.5 mg PO DAILY@1600   


 


 Zolpidem [Ambien] Med  09/23/19 15:38 Active





 5 mg PO BEDTIME PRN   


 


 oxyCODONE Med  09/23/19 18:14 Ordered





 10 mg PO Q4H PRN   


 


 Resuscitation Status Routine Resus Stat  09/23/19 15:35 Ordered








 Medication Orders





Acetaminophen (Tylenol)  650 mg PO Q4H PRN


   PRN Reason: Pain


Aspirin (Halfprin)  81 mg PO BEDTIME [facility]


Bumetanide (Bumex)  2 mg PO DAILY [facility]


Carvedilol (Coreg)  12.5 mg PO BID [facility]


Coenzyme Q10 (Coenzyme Q10)  100 mg PO BEDTIME [facility]


Fish Oil (Fish Oil)  2 gm PO BID [facility]


Gabapentin (Neurontin)  100 mg PO BID [facility]


Heparin Sodium (Porcine) (Heparin Sodium)  5,000 units SUBCUT Q8H [facility]


   Last Admin: 09/23/19 17:31  Dose: 5,000 units


Isosorbide Mononitrate (Imdur)  60 mg PO DAILY [facility]


Nitroglycerin (Nitrostat)  0.4 mg SL Q5M PRN


   PRN Reason: Chest Pain


Oxycodone HCl (Oxycodone)  10 mg PO Q4H PRN


   PRN Reason: Pain


Rosuvastatin Calcium (Crestor)  20 mg PO BEDTIME [facility]


Senna/Docusate Sodium (Senna Plus)  2 tab PO BID PRN


   PRN Reason: Constipation


Warfarin Sodium (Coumadin)  2.5 mg PO DAILY@1600 [facility]


   Last Admin: 09/23/19 17:31  Dose: 2.5 mg


Warfarin Sodium (Coumadin Sliding Scale)  1 each PO ASDIRECTED [facility]


Zolpidem Tartrate (Ambien)  5 mg PO BEDTIME PRN


   PRN Reason: Sleep








Assessment/Plan Comment:: 


Admit. obtain record. PT/OT eval. Continue Meds,pain control by oxycontin





- Mortality Measure


Prognosis:: Good

## 2019-09-24 RX ADMIN — HEPARIN SODIUM SCH UNITS: 5000 INJECTION, SOLUTION INTRAVENOUS; SUBCUTANEOUS at 16:58

## 2019-09-24 RX ADMIN — HEPARIN SODIUM SCH UNITS: 5000 INJECTION, SOLUTION INTRAVENOUS; SUBCUTANEOUS at 01:00

## 2019-09-24 RX ADMIN — OMEGA-3 FATTY ACIDS CAP 1000 MG SCH GM: 1000 CAP at 20:22

## 2019-09-24 RX ADMIN — OMEGA-3 FATTY ACIDS CAP 1000 MG SCH GM: 1000 CAP at 09:16

## 2019-09-24 RX ADMIN — ISOSORBIDE MONONITRATE SCH MG: 60 TABLET, FILM COATED, EXTENDED RELEASE ORAL at 09:17

## 2019-09-24 RX ADMIN — HEPARIN SODIUM SCH UNITS: 5000 INJECTION, SOLUTION INTRAVENOUS; SUBCUTANEOUS at 09:18

## 2019-09-25 RX ADMIN — HEPARIN SODIUM SCH UNITS: 5000 INJECTION, SOLUTION INTRAVENOUS; SUBCUTANEOUS at 09:28

## 2019-09-25 RX ADMIN — HEPARIN SODIUM SCH UNITS: 5000 INJECTION, SOLUTION INTRAVENOUS; SUBCUTANEOUS at 01:19

## 2019-09-25 RX ADMIN — HEPARIN SODIUM SCH UNITS: 5000 INJECTION, SOLUTION INTRAVENOUS; SUBCUTANEOUS at 16:11

## 2019-09-25 RX ADMIN — OMEGA-3 FATTY ACIDS CAP 1000 MG SCH GM: 1000 CAP at 20:48

## 2019-09-25 RX ADMIN — OMEGA-3 FATTY ACIDS CAP 1000 MG SCH GM: 1000 CAP at 09:27

## 2019-09-25 RX ADMIN — ISOSORBIDE MONONITRATE SCH MG: 60 TABLET, FILM COATED, EXTENDED RELEASE ORAL at 09:29

## 2019-09-26 VITALS — SYSTOLIC BLOOD PRESSURE: 108 MMHG | HEART RATE: 63 BPM | DIASTOLIC BLOOD PRESSURE: 41 MMHG

## 2019-09-26 RX ADMIN — HEPARIN SODIUM SCH UNITS: 5000 INJECTION, SOLUTION INTRAVENOUS; SUBCUTANEOUS at 01:44

## 2019-09-26 RX ADMIN — ISOSORBIDE MONONITRATE SCH MG: 60 TABLET, FILM COATED, EXTENDED RELEASE ORAL at 09:21

## 2019-09-26 RX ADMIN — OMEGA-3 FATTY ACIDS CAP 1000 MG SCH GM: 1000 CAP at 09:21

## 2019-09-26 RX ADMIN — HEPARIN SODIUM SCH UNITS: 5000 INJECTION, SOLUTION INTRAVENOUS; SUBCUTANEOUS at 09:21

## 2019-09-26 NOTE — DISCH
DISCHARGE DATE:  09/26/2019

 

REASON FOR ADMISSION:

1. Status post right hip arthroplasty.

2. Atrial fibrillation.

3. Postoperative hypotension.

4. CAD.

5. Hypertension.

6. COPD.

7. History of AICD.

 

BRIEF HISTORY AND HOSPITAL COURSE:  This is an 85-year-old male who had right

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

pain control, and has undergone physical and occupational therapy, ready to go

with home health to continue rehab at home.  His pain was controlled by

oxycodone.  His INR at discharge is 1.6.  He has been taking Coumadin and

heparin for DVT prophylaxis.  He will continue with Coumadin, have an INR

checked tomorrow.

 

DISCHARGE MEDICATIONS:  I sent him home with these prescriptions as follows:

1. Acetaminophen 650 mg q.4 hours p.r.n.

2. Aspirin 81 mg at bedtime.

3. Bumex 2 mg daily.

4. Carvedilol 12.5 mg b.i.d.

5. Docusate sodium 2 tablets b.i.d. p.r.n.

6. Omega-3 2 g b.i.d. p.o.

7. Gabapentin 100 mg b.i.d.

8. Isosorbide mononitrate 60 mg a day.

9. Oxycodone 10 mg t.i.d. p.r.n., 20 tablets.

10.Crestor 20 mg at bedtime.

11.Warfarin 2.5 mg daily.

12.Ambien 5 mg at bedtime.

 

FOLLOWUP:  With PCP in 1 week and with home health and also INR to be checked

tomorrow.

 

I spent more than 35 minutes in the discharge of this patient.

 

Job#: [id number]

DD: 09/26/2019 0852

DT: 09/26/2019 1054 [email or web address]

## 2019-11-18 ENCOUNTER — HOSPITAL ENCOUNTER (EMERGENCY)
Dept: HOSPITAL 7 - FB.ED | Age: 84
Discharge: HOME HEALTH | End: 2019-11-18
Payer: MEDICARE

## 2019-11-18 DIAGNOSIS — I25.10: ICD-10-CM

## 2019-11-18 DIAGNOSIS — Z79.899: ICD-10-CM

## 2019-11-18 DIAGNOSIS — Z86.718: ICD-10-CM

## 2019-11-18 DIAGNOSIS — Z88.0: ICD-10-CM

## 2019-11-18 DIAGNOSIS — I25.2: ICD-10-CM

## 2019-11-18 DIAGNOSIS — Z88.8: ICD-10-CM

## 2019-11-18 DIAGNOSIS — Z79.82: ICD-10-CM

## 2019-11-18 DIAGNOSIS — H81.10: Primary | ICD-10-CM

## 2019-11-18 DIAGNOSIS — E78.5: ICD-10-CM

## 2019-11-18 DIAGNOSIS — I50.9: ICD-10-CM

## 2019-11-18 DIAGNOSIS — Z79.01: ICD-10-CM

## 2019-11-18 DIAGNOSIS — I11.0: ICD-10-CM

## 2019-11-18 PROCEDURE — 82962 GLUCOSE BLOOD TEST: CPT

## 2019-11-18 PROCEDURE — 85610 PROTHROMBIN TIME: CPT

## 2019-11-18 PROCEDURE — 85025 COMPLETE CBC W/AUTO DIFF WBC: CPT

## 2019-11-18 PROCEDURE — 83880 ASSAY OF NATRIURETIC PEPTIDE: CPT

## 2019-11-18 PROCEDURE — 36415 COLL VENOUS BLD VENIPUNCTURE: CPT

## 2019-11-18 PROCEDURE — 99284 EMERGENCY DEPT VISIT MOD MDM: CPT

## 2019-11-18 PROCEDURE — 71045 X-RAY EXAM CHEST 1 VIEW: CPT

## 2019-11-18 PROCEDURE — 84484 ASSAY OF TROPONIN QUANT: CPT

## 2019-11-18 PROCEDURE — 93005 ELECTROCARDIOGRAM TRACING: CPT

## 2019-11-18 PROCEDURE — 99283 EMERGENCY DEPT VISIT LOW MDM: CPT

## 2019-11-18 PROCEDURE — 93010 ELECTROCARDIOGRAM REPORT: CPT

## 2019-11-18 PROCEDURE — 80053 COMPREHEN METABOLIC PANEL: CPT

## 2019-11-18 NOTE — EDM.PDOC
ED HPI GENERAL MEDICAL PROBLEM





- General


Chief Complaint: Neurological Problem


Stated Complaint: dizzyness


Time Seen by Provider: 11/18/19 11:05


Source of Information: Reports: Patient


History Limitations: Reports: No Limitations





- History of Present Illness


INITIAL COMMENTS - FREE TEXT/NARRATIVE: 





Patient presented to the ED  because of dizziness at about 0900 while sitting 

down. He described it as a spinning sensation which lasted for ten minutes. He 

denies any chest pain dyspnea although he complains of 5 pound weight gain 

during the past week. He then increased his bumetanide from 1 mg daily to 3 mg 

daily because of this weight gain. He went to his clinic this morning and there 

he had another brief episode of vertigo without any other neurologic symptoms.  





- Related Data


 Allergies











Allergy/AdvReac Type Severity Reaction Status Date / Time


 


amoxicillin [From Augmentin] Allergy  Abdominal Verified 11/18/19 11:25





   Pain  


 


clavulanic acid Allergy  Abdominal Verified 11/18/19 11:25





[From Augmentin]   Pain  


 


simvastatin [From Zocor] Allergy  Other Verified 11/18/19 11:25











Home Meds: 


 Home Meds





Omega-3 Fatty Acids [Fish Oil] 2,000 mg PO BID 05/27/15 [History]


Rosuvastatin [Crestor] 20 mg PO BEDTIME 05/27/15 [History]


Zolpidem [Ambien] 5 mg PO BEDTIME PRN 09/04/18 [History]


Aspirin [Ecotrin EC] 81 mg PO BEDTIME 11/22/18 [History]


Nitroglycerin [Nitrostat] 0.4 mg SL Q5M PRN 11/27/18 [History]


Ubidecarenone [Coenzyme Q10] 100 mg PO BEDTIME 11/27/18 [History]


Acetaminophen [Tylenol] 650 mg PO Q4H PRN 09/23/19 [History]


Bumetanide 2 mg PO DAILY 09/23/19 [History]


Carvedilol 12.5 mg PO BID 09/23/19 [History]


Gabapentin [Neurontin] 100 mg PO BID 09/23/19 [History]


Sennosides/Docusate Sodium [Senna-S] 2 tab PO BID PRN 09/23/19 [History]


Warfarin [Coumadin] 2.5 mg PO DAILY@1600 09/23/19 [History]


Isosorbide Mononitrate [Imdur] 60 mg PO DAILY #30 tab.er 09/26/19 [Rx]


oxyCODONE 10 mg PO TID PRN #20 tablet 09/26/19 [Rx]


Meclizine [Antivert] 25 mg PO Q6H PRN #15 tab 11/18/19 [Rx]











Past Medical History





- Past Health History


Medical/Surgical History: Denies Medical/Surgical History


HEENT History: Reports: Impaired Vision


Other HEENT History: wears glasses


Cardiovascular History: Reports: Automatic Implantable Cardioverter 

Defibrillators, Blood Clots/VTE/DVT, Bypass, CAD, Heart Failure, High 

Cholesterol, Hypertension, MI


Respiratory History: Reports: SOB


Gastrointestinal History: Reports: None


Genitourinary History: Reports: Renal Disease


OB/GYN History: Reports: None


Musculoskeletal History: Reports: Arthritis


Neurological History: Reports: None


Psychiatric History: Reports: None


Endocrine/Metabolic History: Reports: None


Hematologic History: Reports: None


Immunologic History: Reports: None


Oncologic (Cancer) History: Reports: None


Dermatologic History: Reports: None





- Infectious Disease History


Infectious Disease History: Reports: C-Difficile, Measles, Shingles





- Past Surgical History


Head Surgeries/Procedures: Reports: None


HEENT Surgical History: Reports: Adenoidectomy, Tonsillectomy


Cardiovascular Surgical History: Reports: Coronary Artery Bypass, Coronary 

Artery Stent


Other Cardiovascular Surgeries/Procedures: 1 stent, triple bypass


GI Surgical History: Reports: Appendectomy, Colonoscopy, Hernia, Inguinal


Male  Surgical History: Reports: None


Musculoskeletal Surgical History: Reports: Arthroscopic Knee, Hip Replacement, 

Other (See Below)


Other Musculoskeletal Surgeries/Procedures:: bilat hip replacement





Social & Family History





- Family History


Family Medical History: Noncontributory


GI: Reports: None





- Caffeine Use


Caffeine Use: Reports: Coffee


Caffeine Use Comment: Daily





ED ROS GENERAL





- Review of Systems


Review Of Systems: See Below


Constitutional: Reports: No Symptoms, Weight Gain


HEENT: Reports: No Symptoms, Vertigo


Respiratory: Reports: No Symptoms


Cardiovascular: Reports: No Symptoms


Endocrine: Reports: No Symptoms


GI/Abdominal: Reports: No Symptoms


: Reports: No Symptoms


Musculoskeletal: Reports: No Symptoms


Skin: Reports: No Symptoms


Neurological: Reports: Dizziness


Psychiatric: Reports: No Symptoms


Hematologic/Lymphatic: Reports: No Symptoms





ED EXAM, NEURO





- Physical Exam


Exam: See Below


Exam Limited By: No Limitations


General Appearance: Alert, No Apparent Distress


Eye Exam: Bilateral Eye: PERRL


Ears: Normal External Exam


Nose: Normal Inspection, Normal Mucosa, No Blood


Throat/Mouth: Normal Inspection, Normal Lips


Head Exam: Atraumatic, Normocephalic


Neck: Normal Inspection, Supple, Non-Tender, Full Range of Motion


Respiratory/Chest: No Respiratory Distress, Lungs Clear, Normal Breath Sounds


Cardiovascular: Normal Peripheral Pulses, Regular Rate, Rhythm, No JVD


Neurological: Alert, Normal Mood/Affect, CN II-XII Intact, Normal Plantar 

Flexion, Normal Gait, Normal Reflexes, No Motor/Sensory Deficits, Oriented x 3


Extremities: Normal Inspection, Normal Range of Motion, Pedal Edema


Psychiatric: Normal Affect





Course





- Vital Signs


Text/Narrative:: 





labs,EKG discussed with patient and his spouse


His EKG is NSR


trop is slightly elevated but it has been chronically elevated since 2018


BNP-elevated at more than 6K,however, I don't think he has a CHF exacerbation. 

He denies dyspnea and his oxygen saturation is 99% on RA.


CXR-stable old small right pleural eff


I did discuss the case with Dr. Escobar who agreed with plan to hydrate the 

patient and take meclizine at home. Patient is very comfortable in going home.





Last Recorded V/S: 


 Last Vital Signs











Temp  36.4 C   11/18/19 11:02


 


Pulse  66   11/18/19 11:02


 


Resp  18   11/18/19 11:02


 


BP  129/65   11/18/19 11:02


 


Pulse Ox  97   11/18/19 11:02














- Orders/Labs/Meds


Orders: 


 Active Orders 24 hr











 Category Date Time Status


 


 EKG Documentation Completion [RC] ASDIRECTED Care  11/18/19 11:18 Active


 


 Chest 1V Frontal [CR] Stat Exams  11/18/19 11:17 Taken


 


 Sodium Chloride 0.9% [Normal Saline] 500 ml Med  11/18/19 13:44 Ordered





 IV .BOLUS   


 


 EKG 12 Lead [EK] Routine Ther  11/18/19 11:17 Ordered








 Medication Orders





Sodium Chloride (Normal Saline)  500 mls @ 500 mls/hr IV .BOLUS ONE


   Stop: 11/18/19 14:43








Labs: 


 Laboratory Tests











  11/18/19 11/18/19 11/18/19 Range/Units





  11:40 11:40 11:40 


 


WBC  6.3    (4.5-12.0)  X10-3/uL


 


RBC  3.38 L    (4.30-5.75)  x10(6)uL


 


Hgb  11.4 L    (13.5-17.8)  g/dL


 


Hct  33.5    (30.0-51.3)  %


 


MCV  99.3 H    (80-96)  fL


 


MCH  33.6    (27.7-33.6)  pg


 


MCHC  33.9    (32.2-35.4)  g/dL


 


RDW  14.2    (11.5-15.5)  %


 


Plt Count  188    (125-369)  X10(3)uL


 


MPV  8.5    (7.4-10.4)  fL


 


Neut % (Auto)  81.5    (46-82)  %


 


Lymph % (Auto)  9.9 L    (13-37)  %


 


Mono % (Auto)  6.6    (4-12)  %


 


Eos % (Auto)  2    (1.0-5.0)  %


 


Baso % (Auto)  0    (0-2)  %


 


Neut # (Auto)  5.2    (1.6-8.3)  #


 


Lymph # (Auto)  0.6    (0.6-5.0)  #


 


Mono # (Auto)  0.4    (0.0-1.3)  #


 


Eos # (Auto)  0.1    (0.0-0.8)  #


 


Baso # (Auto)  0.0    (0.0-0.2)  #


 


PT     (8.7-11.1)  


 


INR     (0.89-1.13)  


 


Sodium   138   (135-145)  mmol/L


 


Potassium   3.8   (3.5-5.3)  mmol/L


 


Chloride   100   (100-110)  mmol/L


 


Carbon Dioxide   30   (21-32)  mmol/L


 


BUN   30 H   (7-18)  mg/dL


 


Creatinine   1.4 H   (0.70-1.30)  mg/dL


 


Est Cr Clr Drug Dosing   TNP   


 


Estimated GFR (MDRD)   48 L   (>60)  


 


BUN/Creatinine Ratio   21.4 H   (9-20)  


 


Glucose   109   ()  mg/dL


 


Calcium   8.9   (8.6-10.2)  mg/dL


 


Total Bilirubin   0.9   (0.1-1.3)  mg/dL


 


AST   30 H D   (5-25)  IU/L


 


ALT   31  D   (12-36)  U/L


 


Alkaline Phosphatase   159 H   ()  IU/L


 


Troponin I    0.103 H*  (<0.017-0.056)  ng/mL


 


NT-Pro-B Natriuret Pep    6645 H*  (<=450)  pg/mL


 


Total Protein   7.3   (6.0-8.0)  g/dL


 


Albumin   3.5   (3.2-4.6)  g/dL


 


Globulin   3.8   g/dL


 


Albumin/Globulin Ratio   0.9   














  11/18/19 Range/Units





  11:40 


 


WBC   (4.5-12.0)  X10-3/uL


 


RBC   (4.30-5.75)  x10(6)uL


 


Hgb   (13.5-17.8)  g/dL


 


Hct   (30.0-51.3)  %


 


MCV   (80-96)  fL


 


MCH   (27.7-33.6)  pg


 


MCHC   (32.2-35.4)  g/dL


 


RDW   (11.5-15.5)  %


 


Plt Count   (125-369)  X10(3)uL


 


MPV   (7.4-10.4)  fL


 


Neut % (Auto)   (46-82)  %


 


Lymph % (Auto)   (13-37)  %


 


Mono % (Auto)   (4-12)  %


 


Eos % (Auto)   (1.0-5.0)  %


 


Baso % (Auto)   (0-2)  %


 


Neut # (Auto)   (1.6-8.3)  #


 


Lymph # (Auto)   (0.6-5.0)  #


 


Mono # (Auto)   (0.0-1.3)  #


 


Eos # (Auto)   (0.0-0.8)  #


 


Baso # (Auto)   (0.0-0.2)  #


 


PT  21.7 H  (8.7-11.1)  


 


INR  2.26 H  (0.89-1.13)  


 


Sodium   (135-145)  mmol/L


 


Potassium   (3.5-5.3)  mmol/L


 


Chloride   (100-110)  mmol/L


 


Carbon Dioxide   (21-32)  mmol/L


 


BUN   (7-18)  mg/dL


 


Creatinine   (0.70-1.30)  mg/dL


 


Est Cr Clr Drug Dosing   


 


Estimated GFR (MDRD)   (>60)  


 


BUN/Creatinine Ratio   (9-20)  


 


Glucose   ()  mg/dL


 


Calcium   (8.6-10.2)  mg/dL


 


Total Bilirubin   (0.1-1.3)  mg/dL


 


AST   (5-25)  IU/L


 


ALT   (12-36)  U/L


 


Alkaline Phosphatase   ()  IU/L


 


Troponin I   (<0.017-0.056)  ng/mL


 


NT-Pro-B Natriuret Pep   (<=450)  pg/mL


 


Total Protein   (6.0-8.0)  g/dL


 


Albumin   (3.2-4.6)  g/dL


 


Globulin   g/dL


 


Albumin/Globulin Ratio   











Meds: 


Medications











Generic Name Dose Route Start Last Admin





  Trade Name Freq  PRN Reason Stop Dose Admin


 


Sodium Chloride  500 mls @ 500 mls/hr  11/18/19 13:44  





  Normal Saline  IV  11/18/19 14:43  





  .BOLUS ONE   





     





     





     





     














Discontinued Medications














Generic Name Dose Route Start Last Admin





  Trade Name Freq  PRN Reason Stop Dose Admin


 


Sodium Chloride  1,000 mls @ 100 mls/hr  11/18/19 13:30  





  Normal Saline  IV   





  ASDIRECTED [facility]   





     





     





     





     


 


Meclizine HCl  25 mg  11/18/19 11:18  11/18/19 11:40





  Antivert  PO  11/18/19 11:19  25 mg





  ONETIME ONE   Administration





     





     





     





     














Departure





- Departure


Time of Disposition: 13:55


Disposition: Home[facility]


Condition: Good


Clinical Impression: 


 BPV (benign positional vertigo)








- Discharge Information


Prescriptions: 


Meclizine [Antivert] 25 mg PO Q6H PRN #15 tab


 PRN Reason: vertigo


Referrals: 


Strand,Duane, MD [Primary Care Provider] - 


Forms:  ED Department Discharge


Additional Instructions: 


please read discharge instructions on vertigo


hydrate yourself at least 1 liter of fluid a day


take the previous dose of your bumetadine at 2 mg daily


meclizine 25 mg every 6 hours as needed for vertigo


follow up as needed





- My Orders


Last 24 Hours: 


My Active Orders





11/18/19 11:17


Chest 1V Frontal [CR] Stat 


EKG 12 Lead [EK] Routine 





11/18/19 11:18


EKG Documentation Completion [RC] ASDIRECTED 





11/18/19 13:44


Sodium Chloride 0.9% [Normal Saline] 500 ml IV .BOLUS 














- Assessment/Plan


Last 24 Hours: 


My Active Orders





11/18/19 11:17


Chest 1V Frontal [CR] Stat 


EKG 12 Lead [EK] Routine 





11/18/19 11:18


EKG Documentation Completion [RC] ASDIRECTED 





11/18/19 13:44


Sodium Chloride 0.9% [Normal Saline] 500 ml IV .BOLUS

## 2019-11-19 VITALS — SYSTOLIC BLOOD PRESSURE: 131 MMHG | HEART RATE: 60 BPM | DIASTOLIC BLOOD PRESSURE: 61 MMHG

## 2021-05-17 ENCOUNTER — HOSPITAL ENCOUNTER (EMERGENCY)
Dept: HOSPITAL 7 - FB.ED | Age: 86
Discharge: NURSING, REHAB OR LONG-TERM CARE | End: 2021-05-17
Payer: MEDICARE

## 2021-05-17 VITALS — DIASTOLIC BLOOD PRESSURE: 61 MMHG | HEART RATE: 86 BPM | SYSTOLIC BLOOD PRESSURE: 123 MMHG

## 2021-05-17 DIAGNOSIS — R79.89: ICD-10-CM

## 2021-05-17 DIAGNOSIS — N17.9: ICD-10-CM

## 2021-05-17 DIAGNOSIS — K92.1: Primary | ICD-10-CM

## 2021-05-17 DIAGNOSIS — Z79.82: ICD-10-CM

## 2021-05-17 DIAGNOSIS — Z79.899: ICD-10-CM

## 2021-05-17 DIAGNOSIS — I12.9: ICD-10-CM

## 2021-05-17 DIAGNOSIS — Z88.8: ICD-10-CM

## 2021-05-17 DIAGNOSIS — I25.2: ICD-10-CM

## 2021-05-17 DIAGNOSIS — Z88.1: ICD-10-CM

## 2021-05-17 DIAGNOSIS — M19.90: ICD-10-CM

## 2021-05-17 DIAGNOSIS — N18.9: ICD-10-CM

## 2021-05-17 DIAGNOSIS — Z79.01: ICD-10-CM

## 2021-05-17 DIAGNOSIS — D64.9: ICD-10-CM

## 2021-05-17 DIAGNOSIS — R79.1: ICD-10-CM

## 2021-05-17 DIAGNOSIS — D75.89: ICD-10-CM

## 2021-05-17 DIAGNOSIS — E78.00: ICD-10-CM

## 2021-05-17 DIAGNOSIS — Z88.0: ICD-10-CM

## 2021-05-17 PROCEDURE — 86140 C-REACTIVE PROTEIN: CPT

## 2021-05-17 PROCEDURE — 36415 COLL VENOUS BLD VENIPUNCTURE: CPT

## 2021-05-17 PROCEDURE — C9113 INJ PANTOPRAZOLE SODIUM, VIA: HCPCS

## 2021-05-17 PROCEDURE — 96365 THER/PROPH/DIAG IV INF INIT: CPT

## 2021-05-17 PROCEDURE — 99285 EMERGENCY DEPT VISIT HI MDM: CPT

## 2021-05-17 PROCEDURE — 84484 ASSAY OF TROPONIN QUANT: CPT

## 2021-05-17 PROCEDURE — 81001 URINALYSIS AUTO W/SCOPE: CPT

## 2021-05-17 PROCEDURE — 80053 COMPREHEN METABOLIC PANEL: CPT

## 2021-05-17 PROCEDURE — 74176 CT ABD & PELVIS W/O CONTRAST: CPT

## 2021-05-17 PROCEDURE — 85025 COMPLETE CBC W/AUTO DIFF WBC: CPT

## 2021-05-17 PROCEDURE — 93005 ELECTROCARDIOGRAM TRACING: CPT

## 2021-05-17 PROCEDURE — 96375 TX/PRO/DX INJ NEW DRUG ADDON: CPT

## 2021-05-17 PROCEDURE — 85610 PROTHROMBIN TIME: CPT

## 2021-05-17 NOTE — EDM.PDOC
ED HPI GENERAL MEDICAL PROBLEM





- General


Chief Complaint: Gastrointestinal Problem


Stated Complaint: BLACK STOOL


Time Seen by Provider: 05/17/21 18:00


Source of Information: Reports: Patient


History Limitations: Reports: No Limitations





- History of Present Illness


INITIAL COMMENTS - FREE TEXT/NARRATIVE: 





c/o black stools





pt with dark to black stools 3-4x/d for the past 4 days, slight discomfort in 

LLQ but otherwise has felt well, no weak, no f/c/d, no n/v





last colonoscopy 15y ago in [place]





lives with wife, drove himself to ED (after calling clinic, who directed him 

here)





on warfarin for afib, last INR 2.6 last wk





PCP Dr Alicia, cardiologist Dr Garcia at [facility]





PMH: MI age 48, HF, afib





has had COVID vax 





- Related Data


                                    Allergies











Allergy/AdvReac Type Severity Reaction Status Date / Time


 


amoxicillin [From Augmentin] Allergy  Abdominal Verified 05/17/21 18:19





   Pain  


 


clavulanic acid Allergy  Abdominal Verified 05/17/21 18:19





[From Augmentin]   Pain  


 


simvastatin [From Zocor] Allergy  Other Verified 05/17/21 18:19











Home Meds: 


                                    Home Meds





Omega-3 Fatty Acids [Fish Oil] 2,000 mg PO BID 05/27/15 [History]


Rosuvastatin [Crestor] 20 mg PO BEDTIME 05/27/15 [History]


Zolpidem [Ambien] 5 mg PO BEDTIME PRN 09/04/18 [History]


Aspirin [Ecotrin EC] 81 mg PO BEDTIME 11/22/18 [History]


Nitroglycerin [Nitrostat] 0.4 mg SL Q5M PRN 11/27/18 [History]


Ubidecarenone [Coenzyme Q10] 100 mg PO BEDTIME 11/27/18 [History]


Acetaminophen [Tylenol] 650 mg PO Q4H PRN 09/23/19 [History]


Bumetanide 2 mg PO DAILY 09/23/19 [History]


Gabapentin [Neurontin] 100 mg PO BID 09/23/19 [History]


Sennosides/Docusate Sodium [Senna-S] 2 tab PO BID PRN 09/23/19 [History]


Warfarin [Coumadin] 2.5 mg PO DAILY@1600 09/23/19 [History]


carvediloL [Carvedilol] 12.5 mg PO BID 09/23/19 [History]


Isosorbide Mononitrate [Imdur] 60 mg PO DAILY #30 tab.er 09/26/19 [Rx]


oxyCODONE 10 mg PO TID PRN #20 tablet 09/26/19 [Rx]


Meclizine [Antivert] 25 mg PO Q6H PRN #15 tab 11/18/19 [Rx]











Past Medical History





- Past Health History


Medical/Surgical History: Denies Medical/Surgical History


HEENT History: Reports: Impaired Vision


Other HEENT History: wears glasses


Cardiovascular History: Reports: Automatic Implantable Cardioverter 

Defibrillators, Blood Clots/VTE/DVT, Bypass, CAD, Heart Failure, High 

Cholesterol, Hypertension, MI


Respiratory History: Reports: SOB


Gastrointestinal History: Reports: None


Genitourinary History: Reports: Renal Disease


OB/GYN History: Reports: None


Musculoskeletal History: Reports: Arthritis


Neurological History: Reports: None


Psychiatric History: Reports: None


Endocrine/Metabolic History: Reports: None


Hematologic History: Reports: None


Immunologic History: Reports: None


Oncologic (Cancer) History: Reports: None


Dermatologic History: Reports: None





- Infectious Disease History


Infectious Disease History: Reports: C-Difficile, Measles, Shingles





- Past Surgical History


Head Surgeries/Procedures: Reports: None


HEENT Surgical History: Reports: Adenoidectomy, Tonsillectomy


Cardiovascular Surgical History: Reports: Coronary Artery Bypass, Coronary 

Artery Stent


Other Cardiovascular Surgeries/Procedures: 1 stent, triple bypass


GI Surgical History: Reports: Appendectomy, Colonoscopy, Hernia, Inguinal


Male  Surgical History: Reports: None


Musculoskeletal Surgical History: Reports: Arthroscopic Knee, Hip Replacement, 

Other (See Below)


Other Musculoskeletal Surgeries/Procedures:: bilat hip replacement





Social & Family History





- Family History


Family Medical History: No Pertinent Family History


GI: Reports: None





- Caffeine Use


Caffeine Use: Reports: Coffee


Caffeine Use Comment: Daily





ED ROS GENERAL





- Review of Systems


Review Of Systems: See Below


Constitutional: Reports: No Symptoms


HEENT: Reports: No Symptoms


Respiratory: Reports: No Symptoms


Cardiovascular: Reports: No Symptoms


Endocrine: Reports: No Symptoms


GI/Abdominal: Reports: Abdominal Pain, Black Stool.  Denies: Constipation, 

Diarrhea, Nausea, Vomiting


: Reports: No Symptoms


Musculoskeletal: Reports: No Symptoms


Skin: Reports: No Symptoms


Neurological: Reports: No Symptoms


Psychiatric: Reports: No Symptoms


Hematologic/Lymphatic: Reports: No Symptoms


Immunologic: Reports: No Symptoms





ED EXAM, GI/ABD





- Physical Exam


Exam: See Below


Exam Limited By: No Limitations


General Appearance: Alert, WD/WN, No Apparent Distress, Other


Throat/Mouth: Normal Inspection, No Airway Compromise


Head: Atraumatic, Normocephalic


Neck: Normal Inspection, Supple, Non-Tender, Full Range of Motion.  No: 

Lymphadenopathy (R), Lymphadenopathy (L)


Respiratory/Chest: No Respiratory Distress, Lungs Clear, Normal Breath Sounds, 

No Accessory Muscle Use, Chest Non-Tender


Cardiovascular: Other (quiet precordium, near regular, 2/6 FREDI at [facility], PMI not 

palp)


GI/Abdominal Exam: Normal Bowel Sounds, Soft, No Organomegaly, No Distention, 

Other (slight tender to deep palp in the midpoint of the LLQ, soft, ND, no 

guard/rebound)


Back Exam: Normal Inspection, Full Range of Motion


Extremities: Normal Inspection, Normal Range of Motion, Non-Tender, No Pedal 

Edema


Neurological: Alert, Oriented, CN II-XII Intact, Normal Cognition


Psychiatric: Normal Affect, Normal Mood


Skin Exam: Warm, Dry, Intact, Normal Color, No Rash


Lymphatic: No Adenopathy





Course





- Vital Signs


Last Recorded V/S: 





                                Last Vital Signs











Temp  36.6 C   05/17/21 17:58


 


Pulse  83   05/17/21 17:58


 


Resp  16   05/17/21 17:58


 


BP  105/51 L  05/17/21 17:58


 


Pulse Ox  100   05/17/21 17:58














- Orders/Labs/Meds


Orders: 





                               Active Orders 24 hr











 Category Date Time Status


 


 EKG Documentation Completion [RC] ASDIRECTED Care  05/17/21 18:20 Ordered


 


 C-REACTIVE PROTEIN [CHEM] Stat Lab  05/17/21 18:18 Ordered


 


 CBC WITH AUTO DIFF [HEME] Stat Lab  05/17/21 18:18 Ordered


 


 COMPREHENSIVE METABOLIC PN,CMP [CHEM] Stat Lab  05/17/21 18:18 Ordered


 


 Hemoccult [OCCULT BLOOD DIAGNOSTIC] [OP] Stat Lab  05/17/21 18:18 Ordered


 


 INR,PT,PROTHROMBIN TIME [COAG] Stat Lab  05/17/21 18:18 Ordered


 


 TROPONIN I [CHEM] Stat Lab  05/17/21 18:18 Ordered


 


 UA W/MICROSCOPIC [URIN] Stat Lab  05/17/21 18:18 Ordered


 


 EKG 12 Lead [EK] Routine Ther  05/17/21 18:18 Ordered














- Re-Assessments/Exams


Free Text/Narrative Re-Assessment/Exam: 





05/17/21 18:54


labs and CT ordered, will sign out to Dr Alves at 19:00 at change of shift, pt 

aware





05/17/21 19:05


hgb 9.6, down from 11.4 from 2y ago





INR 3.68





BUN/creat inc'd from baseline





Departure





- Departure


Time of Disposition: 19:04


Disposition: Still A Patient 30


Clinical Impression: 


 Melena, Anemia, Macrocytosis, Acute on chronic renal insufficiency, Elevated 

liver function tests, Elevated INR








- Discharge Information


*PRESCRIPTION DRUG MONITORING PROGRAM REVIEWED*: Not Applicable


*COPY OF PRESCRIPTION DRUG MONITORING REPORT IN PATIENT BRENDAN: Not Applicable





Sepsis Event Note (ED)





- Evaluation


Sepsis Screening Result: No Definite Risk





- Focused Exam


Vital Signs: 





                                   Vital Signs











  Temp Pulse Resp BP Pulse Ox


 


 05/17/21 17:58  36.6 C  83  16  105/51 L  100














- My Orders


Last 24 Hours: 





My Active Orders





05/17/21 18:18


C-REACTIVE PROTEIN [CHEM] Stat 


CBC WITH AUTO DIFF [HEME] Stat 


COMPREHENSIVE METABOLIC PN,CMP [CHEM] Stat 


Hemoccult [OCCULT BLOOD DIAGNOSTIC] [OP] Stat 


INR,PT,PROTHROMBIN TIME [COAG] Stat 


TROPONIN I [CHEM] Stat 


UA W/MICROSCOPIC [URIN] Stat 


EKG 12 Lead [EK] Routine 





05/17/21 18:20


EKG Documentation Completion [RC] ASDIRECTED 














- Assessment/Plan


Last 24 Hours: 





My Active Orders





05/17/21 18:18


C-REACTIVE PROTEIN [CHEM] Stat 


CBC WITH AUTO DIFF [HEME] Stat 


COMPREHENSIVE METABOLIC PN,CMP [CHEM] Stat 


Hemoccult [OCCULT BLOOD DIAGNOSTIC] [OP] Stat 


INR,PT,PROTHROMBIN TIME [COAG] Stat 


TROPONIN I [CHEM] Stat 


UA W/MICROSCOPIC [URIN] Stat 


EKG 12 Lead [EK] Routine 





05/17/21 18:20


EKG Documentation Completion [RC] ASDIRECTED

## 2021-06-21 ENCOUNTER — HOSPITAL ENCOUNTER (OUTPATIENT)
Dept: HOSPITAL 7 - FB.ED | Age: 86
Setting detail: OBSERVATION
LOS: 1 days | Discharge: HOME HEALTH | End: 2021-06-22
Attending: FAMILY MEDICINE | Admitting: EMERGENCY MEDICINE
Payer: MEDICARE

## 2021-06-21 DIAGNOSIS — I25.10: ICD-10-CM

## 2021-06-21 DIAGNOSIS — Z87.891: ICD-10-CM

## 2021-06-21 DIAGNOSIS — E78.00: ICD-10-CM

## 2021-06-21 DIAGNOSIS — Z95.0: ICD-10-CM

## 2021-06-21 DIAGNOSIS — Z88.8: ICD-10-CM

## 2021-06-21 DIAGNOSIS — N18.31: ICD-10-CM

## 2021-06-21 DIAGNOSIS — Z88.1: ICD-10-CM

## 2021-06-21 DIAGNOSIS — Z79.01: ICD-10-CM

## 2021-06-21 DIAGNOSIS — I13.0: Primary | ICD-10-CM

## 2021-06-21 DIAGNOSIS — J44.9: ICD-10-CM

## 2021-06-21 DIAGNOSIS — I25.2: ICD-10-CM

## 2021-06-21 DIAGNOSIS — I50.9: ICD-10-CM

## 2021-06-21 DIAGNOSIS — Z79.899: ICD-10-CM

## 2021-06-21 DIAGNOSIS — Z79.82: ICD-10-CM

## 2021-06-21 PROCEDURE — G0378 HOSPITAL OBSERVATION PER HR: HCPCS

## 2021-06-21 RX ADMIN — PANTOPRAZOLE SODIUM SCH MG: 40 TABLET, DELAYED RELEASE ORAL at 21:39

## 2021-06-21 RX ADMIN — CARVEDILOL SCH MG: 6.25 TABLET, FILM COATED ORAL at 21:37

## 2021-06-21 NOTE — CR
INDICATION:  Short of breath. 



CHEST ONE-VIEW [id number]:  AP upright portable view of the chest 06/21/21 was 
compared with 11/18/19 and 09/28/18. 



The heart is again noted to be enlarged with post median sternotomy change.  
Pacemaker leads are again noted, unchanged. 



Calcifications are noted in the aorta, descending and arch area, with tortuosity
of the aorta noted.  



There are again noted pleural parenchymal changes at the left lung base likely 
fibrotic in nature as they appear essentially changed from 2019.  No definite 
active infiltrate or effusion was identified.  



Overlying EKG leads and snap noted. 



IMPRESSION: 

1.  No acute process. 

2.  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 

3.  ASHD with cardiomegaly, post median sternotomy change, and pacemaker leads. 

MTDD

## 2021-06-21 NOTE — EDM.PDOC
ED HPI GENERAL MEDICAL PROBLEM





- General


Chief Complaint: Respiratory Problem


Stated Complaint: sob


Time Seen by Provider: 06/21/21 10:21


Source of Information: Reports: Patient


History Limitations: Reports: No Limitations





- History of Present Illness


INITIAL COMMENTS - FREE TEXT/NARRATIVE: 





86-year-old male who reports shortness of breath that has been going on for a 

few years but beginning about 10 days ago he has had increased shortness of 

breath that has progressively worsened over time. He reports that about 10 days 

ago he had blood in his stool and had an EGD which showed some type of ulcer and

he was taken off of his Coumadin and placed on Protonix and Carafate. He reports

that he was given 2 units of blood following this as well and he feels that his 

shortness of breath is worsening and over the past 2 days he also has noticed 

that he has had some abdominal bloating with central abdominal discomfort that 

he reports is a fullness type of discomfort. He rates that discomfort is a 5/10.

He also reports that his shortness of breath has progressed to being short of 

breath even at rest where it had just been with activity. He denies any chest 

pain. There has been no nausea or vomiting. He has had a poor appetite but he 

has been eating and drinking. The pain is not made better or worse with eating 

or drinking. In addition, he has been having several bowel movements daily that 

are all amounts but he has noticed no blood in them. He reports that when he 

urinates he also feels that he has to defecate at the same time (this has been 

going on for the past 5-6 days). He has had no dysuria or hematuria. He also 

reports that he feels weak all over and he has some dizziness with standing. 

There are no other associated signs or symptoms. There are no other modifying 

factors.


Onset: Other (Ongoing for the past 10 days.)


Duration: Getting Worse


Location: Reports: Abdomen (Abdominal discomfort for the past 2 days with some 

distention.)


Quality: Reports: Other (Fullness)


Severity: Moderate


Improves with: Reports: None


Worsens with: Reports: Other (Activity)


Context: Reports: Other (As above)


Associated Symptoms: Reports: No Other Symptoms (Except as above.)


Treatments PTA: Reports: Other Medication(s) (He took an extra Bumex today.)


  ** Abdomen


Pain Score (Numeric/FACES): 5





- Related Data


                                    Allergies











Allergy/AdvReac Type Severity Reaction Status Date / Time


 


simvastatin [From Zocor] Allergy Unknown Other Verified 06/21/21 14:01


 


amoxicillin [From Augmentin] Allergy  Abdominal Verified 06/21/21 14:01





   Pain  


 


clavulanic acid Allergy  Abdominal Verified 06/21/21 14:01





[From Augmentin]   Pain  


 


sucralfate [From Carafate] Allergy  Itching Verified 06/21/21 14:01











Home Meds: 


                                    Home Meds





Rosuvastatin [Crestor] 20 mg PO BEDTIME 05/27/15 [History]


Aspirin [Ecotrin EC] 81 mg PO BEDTIME 11/22/18 [History]


Nitroglycerin [Nitrostat] 0.4 mg SL Q5M PRN 11/27/18 [History]


Bumetanide 1 mg PO DAILY 09/23/19 [History]


Sennosides/Docusate Sodium [Senna-S] 2 tab PO BID PRN 09/23/19 [History]


Isosorbide Mononitrate [Imdur] 30 mg PO DAILY 06/21/21 [History]


Pantoprazole Sodium [Protonix] 40 mg PO BID 06/21/21 [History]


Potassium Chloride 20 meq PO DAILY 06/21/21 [History]


carvediloL [Coreg] 6.25 mg PO BID 06/21/21 [History]


hydrOXYzine pamoate [Hydroxyzine Pamoate] 25 mg PO QID PRN 06/21/21 [History]











Past Medical History


HEENT History: Reports: Impaired Vision


Other HEENT History: wears glasses


Cardiovascular History: Reports: Automatic Implantable Cardioverter 

Defibrillators, Blood Clots/VTE/DVT, Bypass, CAD, Cardiomyopathy, Heart Failure,

 High Cholesterol, Hypertension, MI, Other (See Below)


Other Cardiovascular History: DVT, PFO


Respiratory History: Reports: COPD, SOB


Genitourinary History: Reports: Renal Disease, Other (See Below)


Other Genitourinary History: renal infarct


Musculoskeletal History: Reports: Arthritis, Other (See Below)


Other Musculoskeletal History: sacroiliitis





- Infectious Disease History


Infectious Disease History: Reports: C-Difficile, Measles, Shingles





- Past Surgical History


HEENT Surgical History: Reports: Adenoidectomy, Tonsillectomy


Cardiovascular Surgical History: Reports: Coronary Artery Bypass, Coronary 

Artery Stent


Other Cardiovascular Surgeries/Procedures: 1 stent, triple bypass


GI Surgical History: Reports: Appendectomy, Colonoscopy, EGD, Hernia, Inguinal


Musculoskeletal Surgical History: Reports: Arthroscopic Knee, Hip Replacement, 

Other (See Below)


Other Musculoskeletal Surgeries/Procedures:: bilat hip replacement





Social & Family History





- Tobacco Use


Tobacco Use Status *Q: Former Tobacco User (Nonsmoker for 40+ years.)





- Caffeine Use


Caffeine Use: Reports: None


Caffeine Use Comment: Daily





- Alcohol Use


Alcohol Use History: Yes


Alcohol Use Frequency: Rarely





- Living Situation & Occupation


Living situation: Reports: 


Occupation: Retired





ED ROS GENERAL





- Review of Systems


Review Of Systems: See Below


Constitutional: Denies: Fever, Chills


HEENT: Denies: Throat Pain, Vision Change


Respiratory: Reports: Shortness of Breath.  Denies: Cough, Sputum, Hemoptysis


Cardiovascular: Reports: Dyspnea on Exertion.  Denies: Chest Pain


Endocrine: Denies: Polydypsia, Polyuria


GI/Abdominal: Reports: Abdominal Pain.  Denies: Nausea, Vomiting


: Denies: Dysuria, Hematuria


Musculoskeletal: Reports: Other (Some leg swelling).  Denies: Back Pain, Leg 

Pain


Skin: Denies: Diaphoresis, Rash


Neurological: Reports: Weakness (Feels generally weak all over.).  Denies: 

Dizziness, Headache


Hematologic/Lymphatic: Reports: Anemia, Other (Was on Coumadin until 10 days 

ago)





ED EXAM, GENERAL





- Physical Exam


Exam: See Below


Exam Limited By: No Limitations


General Appearance: Alert, WD/WN, Mild Distress


Eye Exam: Bilateral Eye: EOMI, Normal Inspection (Sclera are anicteric)


Ears: Normal External Exam, Hearing Grossly Normal


Ear Exam: Bilateral Ear: Auricle Normal


Nose: Normal Inspection, Normal Mucosa, No Blood


Throat/Mouth: Normal Oropharynx, Normal Voice, No Airway Compromise


Head: Atraumatic, Normocephalic


Neck: Normal Inspection, Supple, Non-Tender, Full Range of Motion


Respiratory/Chest: No Respiratory Distress, No Accessory Muscle Use, Chest Non-

Tender, Rales (Laterally.)


Cardiovascular: Normal Peripheral Pulses, Regular Rate, Rhythm, JVD


Peripheral Pulses: 2+: Radial (L), Radial (R), Dorsalis Pedis (L), Dorsalis 

Pedis (R)


GI/Abdominal: Normal Bowel Sounds, Soft, No Mass, Distended, Tender (Mild t

enderness in the periumbilical area)


Back Exam: Normal Inspection.  No: CVA Tenderness (R), CVA Tenderness (L)


Extremities: Normal Range of Motion, Normal Capillary Refill, Pedal Edema


Neurological: Alert, Oriented, CN II-XII Intact, Normal Cognition, No 

Motor/Sensory Deficits


Skin Exam: Warm, Dry, Intact, Normal Color, No Rash


  ** #1 Interpretation


EKG Date: 06/21/21


Time: 09:59


Rhythm: A-Fib (A. fib flutter with ventricular paced rhythm)


Rate (Beats/Min): 61


Comparison: No Change (No change from EKG performed on 5/17/2021.)


EKG Interpretation Comments: 





No further analysis attempted due to paced rhythm





Course





- Vital Signs


Last Recorded V/S: 


                                Last Vital Signs











Temp  37.2 C   06/21/21 23:58


 


Pulse  89   06/21/21 23:58


 


Resp  16   06/21/21 23:58


 


BP  141/70 H  06/21/21 23:58


 


Pulse Ox  95   06/21/21 23:58














- Orders/Labs/Meds


Orders: 


                               Active Orders 24 hr











 Category Date Time Status


 


 Admission Status [Patient Status] [ADT] Routine ADT  06/21/21 13:28 Active


 


 Cardiac Monitoring [RC] QSHIFT Care  06/21/21 13:28 Active


 


 Heart Healthy Diet [DIET] Diet  06/21/21 Lunch Ordered


 


 Sodium Chloride 0.9% [Saline Flush] Med  06/21/21 10:46 Active





 10 ml FLUSH ASDIRECTED PRN   


 


 Peripheral IV Insertion Adult [OM.PC] Routine Oth  06/21/21 10:46 Ordered


 


 Code Status [Resuscitation Status] Stat Resus Stat  06/21/21 13:30 Ordered


 


 EKG 12 Lead [EK] Routine Ther  06/21/21 10:46 Ordered








                                Medication Orders





Aspirin (Aspirin 81 Mg Tab.Ec *P-Ricky)  81 mg PO BEDTIME ANMOL


   Last Admin: 06/21/21 21:38  Dose: 81 mg


   Documented by: KELLE


Bumetanide (Bumetanide 1 Mg Tab *Ptom)  1 mg PO DAILY [facility]


Carvedilol (Carvedilol 6.25 Mg Tab *Ptom)  6.25 mg PO BID ANMOL


   Last Admin: 06/21/21 21:37  Dose: 6.25 mg


   Documented by: KELLE


Isosorbide Mononitrate (Isosorbide Mononitrate 60 Mg Tab.Er *Ptom)  30 mg PO 

DAILY ANMOL


Nitroglycerin (Nitroglycerin 0.4 Mg Tab.Sl)  0.4 mg SL Q5M PRN


   PRN Reason: Chest Pain


[Hydroxyzine Pamoate ] 25 Mg Capsule) * Ptom  25 mg PO QID PRN


   PRN Reason: Itching


   Last Admin: 06/21/21 21:43  Dose: 25 mg


   Documented by: KELLE


(Rosuvastatin [ Crestor] 40 Mg Tablet) *Ptom  20 mg PO BEDTIME ANMOL


   Last Admin: 06/21/21 21:39  Dose: 20 mg


   Documented by: KELLE


Pantoprazole Sodium (Pantoprazole 40 Mg Tab.Cr *Ptom)  40 mg PO BID [facility]


   Last Admin: 06/21/21 21:39  Dose: 40 mg


   Documented by: KELLE


Potassium Chloride (Potassium Chloride 10 Meq Tab.Er *Ptom)  10 meq PO DAILY [facility] hospital


Senna/Docusate Sodium (Docusate Sodium/Sennosides 50-8.6 Mg Tab *Ptom)  2 tab PO

BID PRN


   PRN Reason: Constipation


Sodium Chloride (Sodium Chloride 0.9% 10 Ml Syringe)  10 ml FLUSH ASDIRECTED PRN


   PRN Reason: Keep Vein Open


   Last Admin: 06/21/21 15:37  Dose: 10 ml


   Documented by: MICKI


Trazodone HCl (Trazodone 50 Mg Tab)  25 mg PO BEDTIME PRN


   PRN Reason: Insomnia


   Last Admin: 06/21/21 22:42  Dose: 25 mg


   Documented by: KELLE








Labs: 


                                Laboratory Tests











  06/21/21 06/21/21 06/21/21 Range/Units





  10:49 10:55 10:55 


 


WBC   5.6   (3.2-10.1)  x10-3/uL


 


RBC   3.34 L   (3.90-5.90)  x10(6)uL


 


Hgb   10.7 L   (12.9-17.7)  g/dL


 


Hct   32.4 L   (38.3-50.1)  %


 


MCV   97.1   (80.8-98.7)  fL


 


MCH   32.0   (27.0-33.3)  pg


 


MCHC   32.9   (28.7-35.3)  g/dL


 


RDW   17.6 H   (12.4-15.0)  %


 


Plt Count   167   (117-477)  x10(3)uL


 


MPV   9.1   (6.7-11.0)  fL


 


Neut % (Auto)   76.2 H   (40.3-71.8)  %


 


Lymph % (Auto)   10.9 L   (15.8-45.3)  %


 


Mono % (Auto)   10.3   (5.5-15.2)  %


 


Eos % (Auto)   2.0   (0.1-6.8)  %


 


Baso % (Auto)   0.6   (0.3-3.8)  %


 


Neut # (Auto)   4.2   (1.7-6.9)  x10-3/uL


 


Lymph # (Auto)   0.6   (0.5-4.5)  x10-3/uL


 


Mono # (Auto)   0.6   (0.0-1.2)  x10-3/uL


 


Eos # (Auto)   0.1   (0.0-0.6)  x10-3/uL


 


Baso # (Auto)   0.0   (0.0-0.3)  x10-3/uL


 


Sodium    141  (135-145)  mmol/L


 


Potassium    3.8  (3.5-5.3)  mmol/L


 


Chloride    102  (100-110)  mmol/L


 


Carbon Dioxide    29  (21-32)  mmol/L


 


BUN    29 H D  (7-18)  mg/dL


 


Creatinine    1.9 H  (0.70-1.30)  mg/dL


 


Est Cr Clr Drug Dosing    26.09  mL/min


 


Estimated GFR (MDRD)    34 L  (>60)  


 


BUN/Creatinine Ratio    15.3  (9-20)  


 


Glucose    85  ()  mg/dL


 


Calcium    9.0  (8.6-10.2)  mg/dL


 


Magnesium    2.1  (1.8-2.5)  mg/dL


 


Total Bilirubin    1.1  (0.1-1.3)  mg/dL


 


AST    36 H  (5-25)  IU/L


 


ALT    41 H D  (12-36)  U/L


 


Alkaline Phosphatase    182 H  ()  IU/L


 


Troponin I     (4.0-60.3)  pg/mL


 


C-Reactive Protein     (0.5-0.9)  mg/dL


 


NT-Pro-B Natriuret Pep     (<=450)  pg/mL


 


Total Protein    6.8  (6.0-8.0)  g/dL


 


Albumin    3.3  (3.2-4.6)  g/dL


 


Globulin    3.5  g/dL


 


Albumin/Globulin Ratio    0.9  


 


Lipase     ()  U/L


 


Urine Color  Yellow    (YELLOW)  


 


Urine Appearance  Slightly cloudy    (CLEAR)  


 


Urine pH  7.0 H    (5.0-6.5)  


 


Ur Specific Gravity  1.010    (1.010-1.025)  


 


Urine Protein  Negative    (NEGATIVE)  mg/dL


 


Urine Glucose (UA)  Normal    (NORMAL)  mg/dL


 


Urine Ketones  Negative    (NEGATIVE)  mg/dL


 


Urine Occult Blood  Negative    (NEGATIVE)  


 


Urine Nitrite  Negative    (NEGATIVE)  


 


Urine Bilirubin  Negative    (NEGATIVE)  


 


Urine Urobilinogen  Normal    (NEGATIVE)  mg/dL


 


Ur Leukocyte Esterase  Negative    (NEGATIVE)  


 


Urine WBC  0-5    (0-5)  


 


Ur Squamous Epith Cells  Few H    (NS,R,O)  


 


Urine Bacteria  Few H    (NS)  














  06/21/21 06/21/21 Range/Units





  10:55 10:55 


 


WBC    (3.2-10.1)  x10-3/uL


 


RBC    (3.90-5.90)  x10(6)uL


 


Hgb    (12.9-17.7)  g/dL


 


Hct    (38.3-50.1)  %


 


MCV    (80.8-98.7)  fL


 


MCH    (27.0-33.3)  pg


 


MCHC    (28.7-35.3)  g/dL


 


RDW    (12.4-15.0)  %


 


Plt Count    (117-477)  x10(3)uL


 


MPV    (6.7-11.0)  fL


 


Neut % (Auto)    (40.3-71.8)  %


 


Lymph % (Auto)    (15.8-45.3)  %


 


Mono % (Auto)    (5.5-15.2)  %


 


Eos % (Auto)    (0.1-6.8)  %


 


Baso % (Auto)    (0.3-3.8)  %


 


Neut # (Auto)    (1.7-6.9)  x10-3/uL


 


Lymph # (Auto)    (0.5-4.5)  x10-3/uL


 


Mono # (Auto)    (0.0-1.2)  x10-3/uL


 


Eos # (Auto)    (0.0-0.6)  x10-3/uL


 


Baso # (Auto)    (0.0-0.3)  x10-3/uL


 


Sodium    (135-145)  mmol/L


 


Potassium    (3.5-5.3)  mmol/L


 


Chloride    (100-110)  mmol/L


 


Carbon Dioxide    (21-32)  mmol/L


 


BUN    (7-18)  mg/dL


 


Creatinine    (0.70-1.30)  mg/dL


 


Est Cr Clr Drug Dosing    mL/min


 


Estimated GFR (MDRD)    (>60)  


 


BUN/Creatinine Ratio    (9-20)  


 


Glucose    ()  mg/dL


 


Calcium    (8.6-10.2)  mg/dL


 


Magnesium    (1.8-2.5)  mg/dL


 


Total Bilirubin    (0.1-1.3)  mg/dL


 


AST    (5-25)  IU/L


 


ALT    (12-36)  U/L


 


Alkaline Phosphatase    ()  IU/L


 


Troponin I  83.4 H*   (4.0-60.3)  pg/mL


 


C-Reactive Protein  3.2 H*   (0.5-0.9)  mg/dL


 


NT-Pro-B Natriuret Pep  48294 H*   (<=450)  pg/mL


 


Total Protein    (6.0-8.0)  g/dL


 


Albumin    (3.2-4.6)  g/dL


 


Globulin    g/dL


 


Albumin/Globulin Ratio    


 


Lipase   77  ()  U/L


 


Urine Color    (YELLOW)  


 


Urine Appearance    (CLEAR)  


 


Urine pH    (5.0-6.5)  


 


Ur Specific Gravity    (1.010-1.025)  


 


Urine Protein    (NEGATIVE)  mg/dL


 


Urine Glucose (UA)    (NORMAL)  mg/dL


 


Urine Ketones    (NEGATIVE)  mg/dL


 


Urine Occult Blood    (NEGATIVE)  


 


Urine Nitrite    (NEGATIVE)  


 


Urine Bilirubin    (NEGATIVE)  


 


Urine Urobilinogen    (NEGATIVE)  mg/dL


 


Ur Leukocyte Esterase    (NEGATIVE)  


 


Urine WBC    (0-5)  


 


Ur Squamous Epith Cells    (NS,R,O)  


 


Urine Bacteria    (NS)  











Meds: 


Medications











Generic Name Dose Route Start Last Admin





  Trade Name Freq  PRN Reason Stop Dose Admin


 


Aspirin  81 mg  06/21/21 21:00  06/21/21 21:38





  Aspirin 81 Mg Tab.Ec *P-Ricky  PO   81 mg





  BEDTIME ANMOL   Administration


 


Bumetanide  1 mg  06/22/21 09:00 





  Bumetanide 1 Mg Tab *Ptom  PO  





  DAILY ANMOL  


 


Carvedilol  6.25 mg  06/21/21 21:00  06/21/21 21:37





  Carvedilol 6.25 Mg Tab *Ptom  PO   6.25 mg





  BID ANMOL   Administration


 


Isosorbide Mononitrate  30 mg  06/22/21 09:00 





  Isosorbide Mononitrate 60 Mg Tab.Er *Ptom  PO  





  DAILY ANMOL  


 


Nitroglycerin  0.4 mg  06/21/21 16:42 





  Nitroglycerin 0.4 Mg Tab.Sl  SL  





  Q5M PRN  





  Chest Pain  


 


[Hydroxyzine Pamoate  25 mg  06/21/21 16:42  06/21/21 21:43





] 25 Mg Capsule) *  PO   25 mg





Ptom  QID PRN   Administration





  Itching  


 


(Rosuvastatin [  20 mg  06/21/21 21:00  06/21/21 21:39





Crestor] 40 Mg  PO   20 mg





Tablet) *Ptom  BEDTIME ANMOL   Administration


 


Pantoprazole Sodium  40 mg  06/21/21 21:00  06/21/21 21:39





  Pantoprazole 40 Mg Tab.Cr *Ptom  PO   40 mg





  BID ANMOL   Administration


 


Potassium Chloride  10 meq  06/22/21 09:00 





  Potassium Chloride 10 Meq Tab.Er *Ptom  PO  





  DAILY ANMOL  


 


Senna/Docusate Sodium  2 tab  06/21/21 16:42 





  Docusate Sodium/Sennosides 50-8.6 Mg Tab *Ptom  PO  





  BID PRN  





  Constipation  


 


Sodium Chloride  10 ml  06/21/21 10:46  06/21/21 15:37





  Sodium Chloride 0.9% 10 Ml Syringe  FLUSH   10 ml





  ASDIRECTED PRN   Administration





  Keep Vein Open  


 


Trazodone HCl  25 mg  06/21/21 22:20  06/21/21 22:42





  Trazodone 50 Mg Tab  PO   25 mg





  BEDTIME PRN   Administration





  Insomnia  














Discontinued Medications














Generic Name Dose Route Start Last Admin





  Trade Name Freq  PRN Reason Stop Dose Admin


 


Bumetanide  1 mg  06/21/21 14:37  06/21/21 15:37





  Bumetanide 1 Mg/4 Ml Mdv  IVPUSH  06/21/21 14:38  1 mg





  ONETIME ONE   Administration


 


Iopamidol  75 ml  06/21/21 12:51  06/21/21 12:53





  Iopamidol 755 Mg/Ml 75 Ml Bottle  IV  06/21/21 12:52  75 ml





  ASDIRECTED ONE   Administration














- Radiology Interpretation


Free Text/Narrative:: 





Portable chest x-ray showed cardiomegaly and CHF.





CT of the abdomen and pelvis showed ascites. There were what was felt to be 

infiltrates in both bases uncus. There were no other acute intra-abdominal 

abnormality. This was per Dr. Cartwright.





- Re-Assessments/Exams


Free Text/Narrative Re-Assessment/Exam: 





06/21/21 12:00: The patient appears to have decompensated CHF. He does have some

abdominal bloating and discomfort but most of his symptoms seemed related to 

fluid overload. He has an elevated proBNP to 12,000. The elevated troponin but 

this is chronic. The patient's electrolytes are normal. The creatinine is 1.9 

which is where it has been recently. His chest x-ray I feel shows some evidence 

of CHF. And his lung exam has crackles bilaterally. I will send the patient for 

CT of the abdomen and pelvis without IV contrast to rule out any significant 

intra-abdominal pathology. I feel the patient will need admission for IV 

diuresis and he is complicated medically and has not improved with increase in 

his Bumex orally and I think he will need IV diuresis. I have discussed this 

with the patient and with his wife and they would be in agreement with this. The

patient is a DNR/DNI and I have confirmed this with him and with his wife. He 

would not want transfer to another facility. He would wish only to stay at [facility] at this point.





06/21/21 13:00: I discussed the patient's case with Dr. Cartwright and he had 

recommended that we give IV contrast to more fully evaluate the abdomen and 

pelvis. This was done after he had reviewed the uncontrasted CT scan of the 

abdomen and pelvis. This showed abdominal ascites and a really showed no 

evidence of significant intra-abdominal pathology other than the ascites. There 

were bilateral peripheral lower lobe infiltrate seen on the CT scan and Dr. Cartwright question whether Covid was a possibility. The patient has been fully 

vaccinated for Covid and he has no symptoms that would be associated with. His 

symptoms are more closely aligned with decompensated CHF. He does not have any 

head or symptoms that point toward this being a pneumonia at this time.





06/21/21 13:15: I discussed the patient's case with Dr. Aceves, hospitalist 

at [facility], and she will admit the patient. The patient and his 

wife are in agreement with this.








Departure





- Departure


Time of Disposition: 13:28


Disposition: Admitted As Inpatient 66


Condition: Fair


Clinical Impression: 


 Decompensated heart failure, Respiratory distress





Abdominal ascites


Qualifiers:


 Ascites type: other type Qualified Code(s): R18.8 - Other ascites








- Discharge Information





Sepsis Event Note (ED)





- Evaluation


Sepsis Screening Result: No Definite Risk





- My Orders


Last 24 Hours: 


My Active Orders





06/21/21 Lunch


Heart Healthy Diet [DIET] 





06/21/21 10:46


Sodium Chloride 0.9% [Saline Flush]   10 ml FLUSH ASDIRECTED PRN 


Peripheral IV Insertion Adult [OM.PC] Routine 


EKG 12 Lead [EK] Routine 





06/21/21 13:28


Admission Status [Patient Status] [ADT] Routine 


Cardiac Monitoring [RC] QSHIFT 





06/21/21 13:30


Code Status [Resuscitation Status] Stat 














- Assessment/Plan


Last 24 Hours: 


My Active Orders





06/21/21 Lunch


Heart Healthy Diet [DIET] 





06/21/21 10:46


Sodium Chloride 0.9% [Saline Flush]   10 ml FLUSH ASDIRECTED PRN 


Peripheral IV Insertion Adult [OM.PC] Routine 


EKG 12 Lead [EK] Routine 





06/21/21 13:28


Admission Status [Patient Status] [ADT] Routine 


Cardiac Monitoring [RC] QSHIFT 





06/21/21 13:30


Code Status [Resuscitation Status] Stat

## 2021-06-21 NOTE — CT
INDICATION:  Abdominal distension with abdominal pain, periumbilical.



CT ABDOMEN AND PELVIS WITHOUT AND WITH IV CONTRAST:  Spiral 2.5 mm axial 
sections were obtained through the abdomen and pelvis initially without contrast
and then repeated with 75 mL Isovue-370 at 2 mL/sec with sagittal and coronal 
reconstructions 06/21/21 and compared with 05/17/21.  



Total exam DLP was 1254.91 milligray/cm.



There is again essentially unchanged infiltration and localized pleural 
reactions - peripheral infiltration at both lower lobes much more prominent on 
the left than right which may represent persistent pneumonia with localized 
pleuritis.  The appearance raises question of a process such as Covid-19 
pneumonia and should be correlated clinically.  



The heart is enlarged with pacemaker leads.  No definite pericardial effusion 
was seen.  There are a few calcifications in the kidneys which are likely 
representative of arterial calcifications.  Proximal renal arterial 
calcifications are fairly prominent as are aortic calcifications with severe 
calcifications also noted in the splenic artery.  Some calcifications are 
prominent also in the superior mesenteric artery, iliac and femoral arteries.  



There is fairly severe renal cortical scarring on the left which may be due to 
previous bouts of pyelonephritis and/or infarcts.  Relatively minimal findings -
no significant scarring is seen on the right with a small low-density lesion 
compatible with a simple cyst in the upper pole of the right kidney.  



No obstructive uropathy was seen.  



A new finding of moderately prominent abdominal and pelvic ascites is now seen 
with no clear-cut etiology.  The appendix is not visualized compatible with 
appendectomy.



There is a loop of what appears to be distal jejunum or proximal ileum with a 
thickened wall which could represent an inflammatory process or possibly 
ischemic change and should be correlated clinically.  No other etiology for the 
abdominal ascites was identified with relatively normal-appearing liver except 
for some relative heterogeneity of contrast enhancement which may simply be on 
the basis of the patient's age and vascular status.  



The gallbladder demonstrated no calculi.  The adrenal glands were unremarkable 
as was the spleen and pancreas. 



The common bile duct did not appear to be enlarged.  



Retroperitoneal lymphadenopathy is mild and nonspecific.



No retroperitoneal mass was seen.



The urinary bladder was unremarkable as visualized with hard beam artifact due 
to bilateral total hip arthroplasties limiting visualization of the lower 
pelvis.  



No additional mass lesions or organomegaly were identified.  



IMPRESSION:

1.  There is now noted prominent abdominal ascites and pelvic ascites.  The 
etiology is not definitely determined, however, there is a thick-walled loop of 
what appears to be distal jejunum or proximal ileum which could be related as it
could be on the basis of ischemia or inflammatory disease and should be 
correlated clinically.  

2.  ASD with prominent renal artery calcifications and fairly severe scarring of
a portion of the left kidney.

3.  Small probable simple cyst of the upper pole of the right kidney with 
intrarenal calcifications likely on the basis of arterial calcifications, 
although one calyceal calculus may be present in the lower middle pole of the 
right kidney.

4.  Post appendectomy.  

5.  Umbilical hernia which on the noncontrast study included a portion of a loop
of transverse colon  not present on the post IV contrast injection images - 
nonobstructive.  

6.  Degenerative disc disease L4-L5 and L5-S1 with hypertrophic changes fairly 
prominent at L5-S1 and to a lesser degree in mid and lower lumbar levels.  

7.  ASD with no evidence of abdominal aortic aneurysm.  

8.  Pleuroparenchymal changes are again noted in both lung bases - left lower 
lobe more prominent than right lower lobe which may be on the basis of pneumonia
and pleuritis with an appearance raising question of Covid-19 pneumonia - 
correlate clinically.

9.  Bilateral THAs.  



Report was called to Dr. Gallegos at 1314 hours 06/21/21.   

[facility]

## 2021-06-21 NOTE — PCM.HP.2
H&P History of Present Illness





- General


Date of Service: 06/21/21


Admit Problem/Dx: 


                           Admission Diagnosis/Problem





Admission Diagnosis/Problem      CHF, Congestive heart failure








Source of Information: Patient, EMS Notes Reviewed





- History of Present Illness


Initial Comments - Free Text/Narative: 





Alvin comes to ER today for progressively worsening shortness of breath over 

the past 10 days. He has had a gradual weight gain of 4-5 pounds since his Bumex

dose was decreased from 2 mg to 1 mg daily. He had a recent EGD for GI bleed 

that found an ulcer, was taken off his Coumadin and now on Enteric coated 

aspirin 81 mg daily and Protonix. He has allergic reaction to Carafate, all over

itching. Treated with Hydroxyzine. States he still has some residual full body 

itching but much improved. He did require transfusion of PRBCs 5/29 secondary to

recent bleed. He has been having shortness of breath with exertion and now at 

rest. No coughing, fevers, chills. Denies any chest pain, cough, cold symptoms, 

nausea, vomiting, diarrhea, constipation, dysuria or hematuria. He has had some 

abdominal pain with bloating. He has also had weakness and dizziness with 

standing. He has been fluctuating between Bumex 2 mg and 1 mg for past few 

months. His dry weight is around 160 lbs, his weight at home this morning was 

164 lbs. He states sometimes he has gotten to 158 but that is not too often. He 

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 History of Atrial 

fibrillation, pacemaker, CHF. He also reports that he took an extra Bumex this 

morning and has had increased output since arriving to ER. 





ER course: WBC 5.6, Hgb 10.7, proBNP 30972, Creatinine 1.9. Troponin 83.4, CRP 

3.2; UA negative for infection. Had CT abdomen & pelvis with/without contrast 

has some ascites, reducible hernia, bibasilar infiltrates. Telemetry: paced 

rhythm.


  ** Abdomen


Pain Score (Numeric/FACES): 5





- Related Data


Allergies/Adverse Reactions: 


                                    Allergies











Allergy/AdvReac Type Severity Reaction Status Date / Time


 


simvastatin [From Zocor] Allergy Unknown Other Verified 06/21/21 14:01


 


amoxicillin [From Augmentin] Allergy  Abdominal Verified 06/21/21 14:01





   Pain  


 


clavulanic acid Allergy  Abdominal Verified 06/21/21 14:01





[From Augmentin]   Pain  


 


sucralfate [From Carafate] Allergy  Itching Verified 06/21/21 14:01











Home Medications: 


                                    Home Meds





Omega-3 Fatty Acids [Fish Oil] 2,000 mg PO BID 05/27/15 [History]


Rosuvastatin [Crestor] 20 mg PO BEDTIME 05/27/15 [History]


Zolpidem [Ambien] 5 mg PO BEDTIME PRN 09/04/18 [History]


Aspirin [Ecotrin EC] 81 mg PO BEDTIME 11/22/18 [History]


Nitroglycerin [Nitrostat] 0.4 mg SL Q5M PRN 11/27/18 [History]


Ubidecarenone [Coenzyme Q10] 100 mg PO BEDTIME 11/27/18 [History]


Acetaminophen [Tylenol] 1,000 mg PO Q6HR 09/23/19 [History]


Bumetanide 1 mg PO DAILY 09/23/19 [History]


Gabapentin [Neurontin] 100 mg PO BID 09/23/19 [History]


Sennosides/Docusate Sodium [Senna-S] 2 tab PO BID PRN 09/23/19 [History]


Warfarin [Coumadin] 2.5 mg PO DAILY@1600 09/23/19 [History]


carvediloL [Carvedilol] 12.5 mg PO BID 09/23/19 [History]


Isosorbide Mononitrate [Imdur] 60 mg PO DAILY #30 tab.er 09/26/19 [Rx]


oxyCODONE 10 mg PO TID PRN #20 tablet 09/26/19 [Rx]


Meclizine [Antivert] 25 mg PO Q6H PRN #15 tab 11/18/19 [Rx]











Past Medical History





- Past Health History


Medical/Surgical History: Denies Medical/Surgical History


HEENT History: Reports: Impaired Vision


Other HEENT History: wears glasses


Cardiovascular History: Reports: Automatic Implantable Cardioverter 

Defibrillators, Blood Clots/VTE/DVT, Bypass, CAD, Cardiomyopathy, Heart Failure,

 High Cholesterol, Hypertension, MI, Other (See Below)


Other Cardiovascular History: DVT, PFO


Respiratory History: Reports: COPD, SOB


Gastrointestinal History: Reports: None


Genitourinary History: Reports: Renal Disease, Other (See Below)


Other Genitourinary History: renal infarct


OB/GYN History: Reports: None


Musculoskeletal History: Reports: Arthritis, Other (See Below)


Other Musculoskeletal History: sacroiliitis


Neurological History: Reports: None


Psychiatric History: Reports: None


Endocrine/Metabolic History: Reports: None


Hematologic History: Reports: None


Immunologic History: Reports: None


Oncologic (Cancer) History: Reports: None


Dermatologic History: Reports: None





- Infectious Disease History


Infectious Disease History: Reports: C-Difficile, Measles, Shingles





- Past Surgical History


Other Musculoskeletal Surgeries/Procedures:: bilat hip replacement





Social & Family History





- Family History


Family Medical History: No Pertinent Family History


GI: Reports: None





- Tobacco Use


Tobacco Use Status *Q: Former Tobacco User (Nonsmoker for 40+ years.)





- Caffeine Use


Caffeine Use: Reports: Coffee


Caffeine Use Comment: Daily





- Recreational Drug Use


Recreational Drug Use: Yes


Recreational Drug Type: Reports: Marijuana/Hashish





- Living Situation & Occupation


Living situation: Reports: 


Occupation: Retired





H&P Review of Systems





- Review of Systems:


Review Of Systems: Comprehensive ROS is negative, except as noted in HPI.





Exam





- Exam


Exam: See Below





- Vital Signs


Vital Signs: 


                                Last Vital Signs











Temp  98.2 F   06/21/21 14:00


 


Pulse  67   06/21/21 14:00


 


Resp  20   06/21/21 14:00


 


BP  128/61   06/21/21 14:00


 


Pulse Ox  99   06/21/21 14:00











Weight: 161 lb 8 oz





- Exam


General: Alert, Oriented, Cooperative.  No: Mild Distress


HEENT: PERRLA, Conjunctiva Clear, EOMI, Hearing Intact, Mucosa Moist & Pink, 

Nares Patent, Posterior Pharynx Clear, Glasses


Neck: Trachea Midline.  No: Lymphadenopathy


Lungs: Clear to Auscultation (BUL), Normal Respiratory Effort, Decreased Breath 

Sounds (bibasilar), Crackles (bibasilar).  No: Wheezing


Cardiovascular: Regular Rate, Regular Rhythm, Systolic Murmur (3/6 diastolic, 

mitral area.)


GI/Abdominal Exam: Normal Bowel Sounds, Soft, No Distention, Guarding, Tender.  

No: Rigid, Rebound


 (Male) Exam: Deferred


Rectal (Males) Exam: Deferred


Extremities: No Pedal Edema


Peripheral Pulses: 2+: Radial (L), Radial (R)


Neurological: Cranial Nerves Intact, Normal Speech


Psychiatric: Normal Affect, Normal Mood





- Patient Data


Lab Results Last 24 hrs: 


                         Laboratory Results - last 24 hr











  06/21/21 06/21/21 06/21/21 Range/Units





  10:49 10:55 10:55 


 


WBC   5.6   (3.2-10.1)  x10-3/uL


 


RBC   3.34 L   (3.90-5.90)  x10(6)uL


 


Hgb   10.7 L   (12.9-17.7)  g/dL


 


Hct   32.4 L   (38.3-50.1)  %


 


MCV   97.1   (80.8-98.7)  fL


 


MCH   32.0   (27.0-33.3)  pg


 


MCHC   32.9   (28.7-35.3)  g/dL


 


RDW   17.6 H   (12.4-15.0)  %


 


Plt Count   167   (117-477)  x10(3)uL


 


MPV   9.1   (6.7-11.0)  fL


 


Neut % (Auto)   76.2 H   (40.3-71.8)  %


 


Lymph % (Auto)   10.9 L   (15.8-45.3)  %


 


Mono % (Auto)   10.3   (5.5-15.2)  %


 


Eos % (Auto)   2.0   (0.1-6.8)  %


 


Baso % (Auto)   0.6   (0.3-3.8)  %


 


Neut # (Auto)   4.2   (1.7-6.9)  x10-3/uL


 


Lymph # (Auto)   0.6   (0.5-4.5)  x10-3/uL


 


Mono # (Auto)   0.6   (0.0-1.2)  x10-3/uL


 


Eos # (Auto)   0.1   (0.0-0.6)  x10-3/uL


 


Baso # (Auto)   0.0   (0.0-0.3)  x10-3/uL


 


Sodium    141  (135-145)  mmol/L


 


Potassium    3.8  (3.5-5.3)  mmol/L


 


Chloride    102  (100-110)  mmol/L


 


Carbon Dioxide    29  (21-32)  mmol/L


 


BUN    29 H D  (7-18)  mg/dL


 


Creatinine    1.9 H  (0.70-1.30)  mg/dL


 


Est Cr Clr Drug Dosing    26.09  mL/min


 


Estimated GFR (MDRD)    34 L  (>60)  


 


BUN/Creatinine Ratio    15.3  (9-20)  


 


Glucose    85  ()  mg/dL


 


Calcium    9.0  (8.6-10.2)  mg/dL


 


Magnesium    2.1  (1.8-2.5)  mg/dL


 


Total Bilirubin    1.1  (0.1-1.3)  mg/dL


 


AST    36 H  (5-25)  IU/L


 


ALT    41 H D  (12-36)  U/L


 


Alkaline Phosphatase    182 H  ()  IU/L


 


Troponin I     (4.0-60.3)  pg/mL


 


C-Reactive Protein     (0.5-0.9)  mg/dL


 


NT-Pro-B Natriuret Pep     (<=450)  pg/mL


 


Total Protein    6.8  (6.0-8.0)  g/dL


 


Albumin    3.3  (3.2-4.6)  g/dL


 


Globulin    3.5  g/dL


 


Albumin/Globulin Ratio    0.9  


 


Lipase     ()  U/L


 


Urine Color  Yellow    (YELLOW)  


 


Urine Appearance  Slightly cloudy    (CLEAR)  


 


Urine pH  7.0 H    (5.0-6.5)  


 


Ur Specific Gravity  1.010    (1.010-1.025)  


 


Urine Protein  Negative    (NEGATIVE)  mg/dL


 


Urine Glucose (UA)  Normal    (NORMAL)  mg/dL


 


Urine Ketones  Negative    (NEGATIVE)  mg/dL


 


Urine Occult Blood  Negative    (NEGATIVE)  


 


Urine Nitrite  Negative    (NEGATIVE)  


 


Urine Bilirubin  Negative    (NEGATIVE)  


 


Urine Urobilinogen  Normal    (NEGATIVE)  mg/dL


 


Ur Leukocyte Esterase  Negative    (NEGATIVE)  


 


Urine WBC  0-5    (0-5)  


 


Ur Squamous Epith Cells  Few H    (NS,R,O)  


 


Urine Bacteria  Few H    (NS)  














  06/21/21 06/21/21 Range/Units





  10:55 10:55 


 


WBC    (3.2-10.1)  x10-3/uL


 


RBC    (3.90-5.90)  x10(6)uL


 


Hgb    (12.9-17.7)  g/dL


 


Hct    (38.3-50.1)  %


 


MCV    (80.8-98.7)  fL


 


MCH    (27.0-33.3)  pg


 


MCHC    (28.7-35.3)  g/dL


 


RDW    (12.4-15.0)  %


 


Plt Count    (117-477)  x10(3)uL


 


MPV    (6.7-11.0)  fL


 


Neut % (Auto)    (40.3-71.8)  %


 


Lymph % (Auto)    (15.8-45.3)  %


 


Mono % (Auto)    (5.5-15.2)  %


 


Eos % (Auto)    (0.1-6.8)  %


 


Baso % (Auto)    (0.3-3.8)  %


 


Neut # (Auto)    (1.7-6.9)  x10-3/uL


 


Lymph # (Auto)    (0.5-4.5)  x10-3/uL


 


Mono # (Auto)    (0.0-1.2)  x10-3/uL


 


Eos # (Auto)    (0.0-0.6)  x10-3/uL


 


Baso # (Auto)    (0.0-0.3)  x10-3/uL


 


Sodium    (135-145)  mmol/L


 


Potassium    (3.5-5.3)  mmol/L


 


Chloride    (100-110)  mmol/L


 


Carbon Dioxide    (21-32)  mmol/L


 


BUN    (7-18)  mg/dL


 


Creatinine    (0.70-1.30)  mg/dL


 


Est Cr Clr Drug Dosing    mL/min


 


Estimated GFR (MDRD)    (>60)  


 


BUN/Creatinine Ratio    (9-20)  


 


Glucose    ()  mg/dL


 


Calcium    (8.6-10.2)  mg/dL


 


Magnesium    (1.8-2.5)  mg/dL


 


Total Bilirubin    (0.1-1.3)  mg/dL


 


AST    (5-25)  IU/L


 


ALT    (12-36)  U/L


 


Alkaline Phosphatase    ()  IU/L


 


Troponin I  83.4 H*   (4.0-60.3)  pg/mL


 


C-Reactive Protein  3.2 H*   (0.5-0.9)  mg/dL


 


NT-Pro-B Natriuret Pep  65141 H*   (<=450)  pg/mL


 


Total Protein    (6.0-8.0)  g/dL


 


Albumin    (3.2-4.6)  g/dL


 


Globulin    g/dL


 


Albumin/Globulin Ratio    


 


Lipase   77  ()  U/L


 


Urine Color    (YELLOW)  


 


Urine Appearance    (CLEAR)  


 


Urine pH    (5.0-6.5)  


 


Ur Specific Gravity    (1.010-1.025)  


 


Urine Protein    (NEGATIVE)  mg/dL


 


Urine Glucose (UA)    (NORMAL)  mg/dL


 


Urine Ketones    (NEGATIVE)  mg/dL


 


Urine Occult Blood    (NEGATIVE)  


 


Urine Nitrite    (NEGATIVE)  


 


Urine Bilirubin    (NEGATIVE)  


 


Urine Urobilinogen    (NEGATIVE)  mg/dL


 


Ur Leukocyte Esterase    (NEGATIVE)  


 


Urine WBC    (0-5)  


 


Ur Squamous Epith Cells    (NS,R,O)  


 


Urine Bacteria    (NS)  











Result Diagrams: 


                                 06/21/21 10:55





                                 06/21/21 10:55





Sepsis Event Note





- Evaluation


Sepsis Screening Result: No Definite Risk





- Focused Exam


Vital Signs: 


                                   Vital Signs











  Temp Pulse Resp BP Pulse Ox


 


 06/21/21 14:00  98.2 F  67  20  128/61  99


 


 06/21/21 09:50  97.5 F  73  18  122/67  97














*Q Meaningful Use (ADM)





- VTE *Q


VTE Mechanical Contraindications *Q: Congestive Heart Failure


VTE Pharmacological Contraindications *Q: Risk of Bleeding





- VTE Risk Assess *Q


Each Risk Factor Represents 1 Point: Congestive heart failure (CHF)


Total Score 1 Point Risk Factors: 1


Each Risk Factor Represents 2 Points: None


Total Score 2 Point Risk Factors: 0


Each Risk Factor Represents 3 Points: Age 75 Years or Greater


Total Score 3 Point Risk Factors: 3


Each Risk Factor Represents 5 Points: None


Total Score 5 Point Risk Factors: 0


Venous Thromboembolism Risk Factor Score *Q: 4





- Problem List


(1) CHF exacerbation


SNOMED Code(s): 660885797, 42108881016285


   ICD Code: I50.9 - HEART FAILURE, UNSPECIFIED   Status: Acute   Current Visit:

 No   





(2) CHF (congestive heart failure)


SNOMED Code(s): 62885848


   ICD Code: I50.9 - HEART FAILURE, UNSPECIFIED   Status: Chronic   Current 

Visit: No   


Qualifiers: 


   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 





(3) CKD (chronic kidney disease)


SNOMED Code(s): 435829308


   ICD Code: N18.9 - CHRONIC KIDNEY DISEASE, UNSPECIFIED   Status: Chronic   

Current Visit: No   


Qualifiers: 


   Chronic kidney disease stage: stage 3 (moderate) 





(4) CAD (coronary artery disease)


SNOMED Code(s): 89727506


   ICD Code: I25.10 - ATHSCL HEART DISEASE OF NATIVE CORONARY ARTERY W/O ANG 

PCTRS   Status: Chronic   Current Visit: No   


Qualifiers: 


   Coronary Disease-Associated Artery/Lesion type: bypass graft   Associated 

angina: without angina 





(5) COPD (chronic obstructive pulmonary disease)


SNOMED Code(s): 72721795


   ICD Code: J44.9 - CHRONIC OBSTRUCTIVE PULMONARY DISEASE, UNSPECIFIED   

Status: Chronic   Current Visit: No   


Qualifiers: 


   Chronic bronchitis type: unspecified 





(6) Cardiac pacemaker in situ


SNOMED Code(s): 157138256


   ICD Code: Z95.0 - PRESENCE OF CARDIAC PACEMAKER   Status: Chronic   Current 

Visit: No   





(7) HTN (hypertension)


SNOMED Code(s): 88250859


   ICD Code: I10 - ESSENTIAL (PRIMARY) HYPERTENSION   Status: Chronic   Current 

Visit: No   


Qualifiers: 


   Hypertension type: essential hypertension   Qualified Code(s): I10 - 

Essential (primary) hypertension   


Problem List Initiated/Reviewed/Updated: Yes


Orders Last 24hrs: 


                               Active Orders 24 hr











 Category Date Time Status


 


 Admission Status [Patient Status] [ADT] Routine ADT  06/21/21 13:28 Active


 


 Cardiac Monitoring [RC] QSHIFT Care  06/21/21 13:28 Active


 


 Height and Weight [RC] DAILY Care  06/21/21 14:31 Ordered


 


 Intake and Output [RC] QSHIFT Care  06/21/21 14:35 Ordered


 


 Oxygen Therapy [RC] PRN Care  06/21/21 14:31 Ordered


 


 Up ad Liz [RC] ASDIRECTED Care  06/21/21 14:31 Ordered


 


 VTE/DVT Education [RC] Per Unit Routine Care  06/21/21 14:31 Ordered


 


 Vital Signs [RC] Q4H Care  06/21/21 14:31 Ordered


 


 Heart Healthy Diet [DIET] Diet  06/21/21 Lunch Ordered


 


 No Added Salt [DIET] Diet  06/21/21 Dinner Ordered


 


 BASIC METABOLIC PANEL,BMP [CHEM] Routine Lab  06/22/21 06:00 Ordered


 


 Bumetanide [Bumex] Med  06/21/21 14:37 Once





 1 mg IVPUSH ONETIME ONE   


 


 Sodium Chloride 0.9% [Saline Flush] Med  06/21/21 10:46 Active





 10 ml FLUSH ASDIRECTED PRN   


 


 Antiembolic Hose [OM.PC] Per Unit Routine Oth  06/21/21 14:35 Ordered


 


 Peripheral IV Insertion Adult [OM.PC] Routine Oth  06/21/21 10:46 Ordered


 


 Code Status [Resuscitation Status] Stat Resus Stat  06/21/21 13:30 Ordered


 


 EKG 12 Lead [EK] Routine Ther  06/21/21 10:46 Ordered








                                Medication Orders





Sodium Chloride (Sodium Chloride 0.9% 10 Ml Syringe)  10 ml FLUSH ASDIRECTED PRN


   PRN Reason: Keep Vein Open








Assessment/Plan Comment:: 





1. Admit for observation for CHF exacerbation, weakness.


2. CHF: Bumex 1 mg IV x 1 this afternoon, got extra dose this morning. Daily 

weight & I&Os. Telemetry. Repeat BMP tomorrow. If diuresis well overnight 

anticipate discharge tomorrow or weds. 


3. DIET: Heart health, BRUNA.


4. Activity: up ad liz.


5. DVT prophylaxis: TEDs BLE. Recent GI bleed, will continue home aspirin. 


6. CODE STATUS: DNR/DNI.


7. Discharge planning: anticipate discharge in 24-48 hours to home, may benefit 

from home health services for medical management. 





- Mortality Measure


Prognosis:: Good

## 2021-06-22 VITALS — SYSTOLIC BLOOD PRESSURE: 116 MMHG | DIASTOLIC BLOOD PRESSURE: 53 MMHG | HEART RATE: 65 BPM

## 2021-06-22 RX ADMIN — CARVEDILOL SCH MG: 6.25 TABLET, FILM COATED ORAL at 09:09

## 2021-06-22 RX ADMIN — PANTOPRAZOLE SODIUM SCH MG: 40 TABLET, DELAYED RELEASE ORAL at 09:10

## 2021-06-22 NOTE — [id number]
**Discharge Summary





- Hospital Course


HPI Initial Comments: 





Alvin comes to ER today for progressively worsening shortness of breath over 

the past 10 days. He has had a gradual weight gain of 4-5 pounds since his Bumex

dose was decreased from 2 mg to 1 mg daily. He had a recent EGD for GI bleed 

that found an ulcer, was taken off his Coumadin and now on Enteric coated 

aspirin 81 mg daily and Protonix. He has allergic reaction to Carafate, all over

itching. Treated with Hydroxyzine. States he still has some residual full body 

itching but much improved. He did require transfusion of PRBCs 5/29 secondary to

recent bleed. He has been having shortness of breath with exertion and now at 

rest. No coughing, fevers, chills. Denies any chest pain, cough, cold symptoms, 

nausea, vomiting, diarrhea, constipation, dysuria or hematuria. He has had some 

abdominal pain with bloating. He has also had weakness and dizziness with 

standing. He has been fluctuating between Bumex 2 mg and 1 mg for past few 

months. His dry weight is around 160 lbs, his weight at home this morning was 

164 lbs. He states sometimes he has gotten to 158 but that is not too often. He 

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 History of Atrial 

fibrillation, pacemaker, CHF. He also reports that he took an extra Bumex this 

morning and has had increased output since arriving to ER. 





ER course: WBC 5.6, Hgb 10.7, proBNP 53074, Creatinine 1.9. Troponin 83.4, CRP 

3.2; UA negative for infection. Had CT abdomen & pelvis with/without contrast 

has some ascites, reducible hernia, bibasilar infiltrates. Telemetry: paced 

rhythm.


Diagnosis: Stroke: No





- Discharge Data


Discharge Date: 06/22/21


Discharge Disposition: Home, W Home Health Agency 06


Condition: Stable





- Referral to Home Health


Date of Face to Face Encounter: 06/22/21


Reason for Homebound Status: CHF, limited mobility


Primary Care Physician: 


Duane Strand, MD





Skilled Need: Skilled nurse: assess & teach CHF exacerbation & medication 

management.





- Discharge Diagnosis/Problem(s)


(1) CHF exacerbation


SNOMED Code(s): 411848474, 64491780576056


   ICD Code: I50.9 - HEART FAILURE, UNSPECIFIED   Status: Acute   





(2) CHF (congestive heart failure)


SNOMED Code(s): 40545389


   ICD Code: I50.9 - HEART FAILURE, UNSPECIFIED   Status: Chronic   


Qualifiers: 


   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 





(3) CKD (chronic kidney disease)


SNOMED Code(s): 228358112


   ICD Code: N18.9 - CHRONIC KIDNEY DISEASE, UNSPECIFIED   Status: Chronic   


Qualifiers: 


   Chronic kidney disease stage: stage 3 (moderate) 





(4) CAD (coronary artery disease)


SNOMED Code(s): 28306320


   ICD Code: I25.10 - ATHSCL HEART DISEASE OF NATIVE CORONARY ARTERY W/O ANG 

PCTRS   Status: Chronic   


Qualifiers: 


   Coronary Disease-Associated Artery/Lesion type: bypass graft   Associated 

angina: without angina 





(5) COPD (chronic obstructive pulmonary disease)


SNOMED Code(s): 31885135


   ICD Code: J44.9 - CHRONIC OBSTRUCTIVE PULMONARY DISEASE, UNSPECIFIED   

Status: Chronic   


Qualifiers: 


   Chronic bronchitis type: unspecified 





(6) Cardiac pacemaker in situ


SNOMED Code(s): 280963904


   ICD Code: Z95.0 - PRESENCE OF CARDIAC PACEMAKER   Status: Chronic   





(7) HTN (hypertension)


SNOMED Code(s): 58436337


   ICD Code: I10 - ESSENTIAL (PRIMARY) HYPERTENSION   Status: Chronic   


Qualifiers: 


   Hypertension type: essential hypertension   Qualified Code(s): I10 - 

Essential (primary) hypertension   





- Patient Summary/Data


Hospital Course: 





Alvin was admitted overnight for CHF exacerbation: Had 2 mg Bumex prior to 

arrival to floor. He did not require any oxygen on the floor. Received Bumex 1 

mg IV x 1, diuresed about 900 ml overnight. Had no chest pain, telemetry showed 

paced rhythm during hospital course. Creatinine went down from 1.9 to 1.7. 

Weight was stable at 162. He reported yesterday am weight was 164. Will 

discharge on Bumex 1 mg at 0800 and 0.5 mg at 1400, potassium was 1.4 today, he 

has not been taking his 20 mEq of Potassium unless he takes 2 mg of Bumex. Will 

have him take potassium 20 mEq daily on discharge.





- Patient Instructions


Diet: Usual Diet as Tolerated


Activity: As Tolerated


Driving: [place] [address] Today


Showering/Bathing: May Shower


Notify Provider of: Fever, Increased Pain, Nausea and/or Vomiting


Other/Special Instructions: Follow up with Raj Gutierrez PA-C at [facility] Thurs or Fri with recheck BMP.  Added Bumex 0.5 mg daily at 2 pm, will 

continue to take your Bumex 1 mg daily at 8am.  Increase potassium 20 mEQ daily.





- Discharge Plan


*PRESCRIPTION DRUG MONITORING PROGRAM REVIEWED*: Not Applicable


*COPY OF PRESCRIPTION DRUG MONITORING REPORT IN PATIENT BRENDAN: Not Applicable


Prescriptions/Med Rec: 


RX: Bumetanide 0.5 mg PO DAILY@1400 30 Days #30 tablet


RX: Potassium Chloride 20 meq PO DAILY 30 Days #30 tab


Home Medications: 


                                    Home Meds





RX: Rosuvastatin [Crestor] 20 mg PO BEDTIME 05/27/15 [History]


RX: Aspirin [Ecotrin EC] 81 mg PO BEDTIME 11/22/18 [History]


RX: Nitroglycerin [Nitrostat] 0.4 mg SL Q5M PRN 11/27/18 [History]


RX: Sennosides/Docusate Sodium [Senna-S] 2 tab PO BID PRN 09/23/19 [History]


RX: Isosorbide Mononitrate [Imdur] 30 mg PO DAILY 06/21/21 [History]


RX: Pantoprazole Sodium [Protonix] 40 mg PO BID 06/21/21 [History]


RX: carvediloL [Coreg] 6.25 mg PO BID 06/21/21 [History]


RX: hydrOXYzine pamoate [Hydroxyzine Pamoate] 25 mg PO QID PRN 06/21/21 

[History]


RX: Bumetanide 0.5 mg PO DAILY@1400 30 Days #30 tablet 06/22/21 [Rx]


RX: Bumetanide 1 mg PO DAILY@0800 30 Days #30 tab 06/22/21 [Rx]


RX: Potassium Chloride 20 meq PO DAILY 30 Days #30 tab 06/22/21 [Rx]








Oxygen Therapy Mode: Room Air


Patient Handouts:  Potassium Chloride Extended-Release Capsules, Heart Failure, 

Self Care, Easy-to-Read, Bumetanide Oral Tablets


Forms:  ED Department Discharge


Referrals: 


Strand,Duane, MD [Primary Care Provider] - 





- Discharge Summary/Plan Comment


DC Time >30 min.: No





- General Info


Date of Service: 06/22/21


Subjective Update: 





Alvin is feeling much better this morning, his weight is stable at 162 lbs. 

His creatinine improved from 1.9 to 1.7. Denies any chest pain, cough, fever. Up

walking in room. 





- Patient Data


Vitals - Most Recent: 


                                Last Vital Signs











Temp  98.4 F   06/22/21 08:00


 


Pulse  65   06/22/21 09:09


 


Resp  18   06/22/21 08:00


 


BP  116/53 L  06/22/21 09:09


 


Pulse Ox  96   06/22/21 08:00











Weight - Most Recent: 162 lb


I&O - Last 24 hours: 


                                 Intake & Output











 06/22/21 06/22/21 06/22/21





 06:59 14:59 22:59


 


Output Total 600  


 


Balance -600  











Lab Results - Last 24 hrs: 


                         Laboratory Results - last 24 hr











  06/22/21 06/22/21 Range/Units





  06:05 06:05 


 


WBC   5.0  (3.2-10.1)  x10-3/uL


 


RBC   3.20 L  (3.90-5.90)  x10(6)uL


 


Hgb   10.0 L  (12.9-17.7)  g/dL


 


Hct   30.7 L  (38.3-50.1)  %


 


MCV   96.2  (80.8-98.7)  fL


 


MCH   31.4  (27.0-33.3)  pg


 


MCHC   32.6  (28.7-35.3)  g/dL


 


RDW   17.9 H  (12.4-15.0)  %


 


Plt Count   144  (117-477)  x10(3)uL


 


MPV   9.6  (6.7-11.0)  fL


 


Neut % (Auto)   74.1 H  (40.3-71.8)  %


 


Lymph % (Auto)   11.4 L  (15.8-45.3)  %


 


Mono % (Auto)   12.3  (5.5-15.2)  %


 


Eos % (Auto)   1.7  (0.1-6.8)  %


 


Baso % (Auto)   0.5  (0.3-3.8)  %


 


Neut # (Auto)   3.7  (1.7-6.9)  x10-3/uL


 


Lymph # (Auto)   0.6  (0.5-4.5)  x10-3/uL


 


Mono # (Auto)   0.6  (0.0-1.2)  x10-3/uL


 


Eos # (Auto)   0.1  (0.0-0.6)  x10-3/uL


 


Baso # (Auto)   0.0  (0.0-0.3)  x10-3/uL


 


Sodium  142   (135-145)  mmol/L


 


Potassium  3.4 L   (3.5-5.3)  mmol/L


 


Chloride  103   (100-110)  mmol/L


 


Carbon Dioxide  26   (21-32)  mmol/L


 


BUN  31 H   (7-18)  mg/dL


 


Creatinine  1.7 H   (0.70-1.30)  mg/dL


 


Est Cr Clr Drug Dosing  29.16   mL/min


 


Estimated GFR (MDRD)  38 L   (>60)  


 


BUN/Creatinine Ratio  18.2   (9-20)  


 


Glucose  91   ()  mg/dL


 


Calcium  8.8   (8.6-10.2)  mg/dL











Med Orders - Current: 


                               Current Medications








Discontinued Medications





Aspirin (Aspirin 81 Mg Tab.Ec *P-Ricky)  81 mg PO BEDTIME [facility]


   Last Admin: 06/21/21 21:38 Dose:  81 mg


   Documented by: 


Bumetanide (Bumetanide 1 Mg/4 Ml Mdv)  1 mg IVPUSH ONETIME ONE


   Stop: 06/21/21 14:38


   Last Admin: 06/21/21 15:37 Dose:  1 mg


   Documented by: 


Bumetanide (Bumetanide 1 Mg Tab *Ptom)  1 mg PO DAILY [facility]


   Last Admin: 06/22/21 09:10 Dose:  1 mg


   Documented by: 


Carvedilol (Carvedilol 6.25 Mg Tab *Ptom)  6.25 mg PO BID [facility]


   Last Admin: 06/22/21 09:09 Dose:  6.25 mg


   Documented by: 


Iopamidol (Iopamidol 755 Mg/Ml 75 Ml Bottle)  75 ml IV ASDIRECTED ONE


   Stop: 06/21/21 12:52


   Last Admin: 06/21/21 12:53 Dose:  75 ml


   Documented by: 


Isosorbide Mononitrate (Isosorbide Mononitrate 60 Mg Tab.Er *Ptom)  30 mg PO 

DAILY [facility]


   Last Admin: 06/22/21 09:10 Dose:  30 mg


   Documented by: 


Nitroglycerin (Nitroglycerin 0.4 Mg Tab.Sl)  0.4 mg SL Q5M PRN


   PRN Reason: Chest Pain


[Hydroxyzine Pamoate ] 25 Mg Capsule) * Ptom  25 mg PO QID PRN


   PRN Reason: Itching


   Last Admin: 06/22/21 06:42 Dose:  25 mg


   Documented by: 


(Rosuvastatin [ Crestor] 40 Mg Tablet) *Ptom  20 mg PO BEDTIME [facility]


   Last Admin: 06/21/21 21:39 Dose:  20 mg


   Documented by: 


Pantoprazole Sodium (Pantoprazole 40 Mg Tab.Cr *Ptom)  40 mg PO BID [facility]


   Last Admin: 06/22/21 09:10 Dose:  40 mg


   Documented by: 


Potassium Chloride (Potassium Chloride 10 Meq Tab.Er *Ptom)  10 meq PO DAILY [facility]


   Last Admin: 06/22/21 09:09 Dose:  10 meq


   Documented by: 


Senna/Docusate Sodium (Docusate Sodium/Sennosides 50-8.6 Mg Tab *Ptom)  2 tab PO

BID PRN


   PRN Reason: Constipation


Sodium Chloride (Sodium Chloride 0.9% 10 Ml Syringe)  10 ml FLUSH ASDIRECTED PRN


   PRN Reason: Keep Vein Open


   Last Admin: 06/21/21 15:37 Dose:  10 ml


   Documented by: 


Trazodone HCl (Trazodone 50 Mg Tab)  25 mg PO BEDTIME PRN


   PRN Reason: Insomnia


   Last Admin: 06/21/21 22:42 Dose:  25 mg


   Documented by: 











- Exam


General: Reports: Alert, Oriented, Cooperative, No Acute Distress


Lungs: Reports: Clear to Auscultation, Normal Respiratory Effort, Crackles (rare

in bibasilar).  Denies: Wheezing


Cardiovascular: Reports: Regular Rate, Regular Rhythm (paced), Murmurs


GI/Abdominal Exam: Normal Bowel Sounds, Soft, Non-Tender, No Distention


 (Male) Exam: Deferred


Rectal (Males) Exam: Deferred


Extremities: No Pedal Edema





*Q Meaningful Use (DIS)





- VTE *Q


VTE Mechanical Contraindications *Q: Congestive Heart Failure


VTE Pharmacological Contraindications *Q: Risk of Bleeding

## 2021-09-27 ENCOUNTER — HOSPITAL ENCOUNTER (INPATIENT)
Dept: HOSPITAL 7 - FB.ED | Age: 86
LOS: 2 days | Discharge: HOME | DRG: 812 | End: 2021-09-29
Attending: STUDENT IN AN ORGANIZED HEALTH CARE EDUCATION/TRAINING PROGRAM | Admitting: STUDENT IN AN ORGANIZED HEALTH CARE EDUCATION/TRAINING PROGRAM
Payer: MEDICARE

## 2021-09-27 DIAGNOSIS — N17.9: ICD-10-CM

## 2021-09-27 DIAGNOSIS — D50.0: Primary | ICD-10-CM

## 2021-09-27 DIAGNOSIS — I42.9: ICD-10-CM

## 2021-09-27 DIAGNOSIS — Z95.5: ICD-10-CM

## 2021-09-27 DIAGNOSIS — Z88.6: ICD-10-CM

## 2021-09-27 DIAGNOSIS — N18.9: ICD-10-CM

## 2021-09-27 DIAGNOSIS — Z86.718: ICD-10-CM

## 2021-09-27 DIAGNOSIS — I24.8: ICD-10-CM

## 2021-09-27 DIAGNOSIS — H54.7: ICD-10-CM

## 2021-09-27 DIAGNOSIS — I50.42: ICD-10-CM

## 2021-09-27 DIAGNOSIS — I13.0: ICD-10-CM

## 2021-09-27 DIAGNOSIS — Z98.890: ICD-10-CM

## 2021-09-27 DIAGNOSIS — Z79.01: ICD-10-CM

## 2021-09-27 DIAGNOSIS — R77.8: ICD-10-CM

## 2021-09-27 DIAGNOSIS — Z88.8: ICD-10-CM

## 2021-09-27 DIAGNOSIS — Z95.1: ICD-10-CM

## 2021-09-27 DIAGNOSIS — I25.10: ICD-10-CM

## 2021-09-27 DIAGNOSIS — Z96.643: ICD-10-CM

## 2021-09-27 DIAGNOSIS — I25.2: ICD-10-CM

## 2021-09-27 DIAGNOSIS — J44.9: ICD-10-CM

## 2021-09-27 DIAGNOSIS — E86.0: ICD-10-CM

## 2021-09-27 DIAGNOSIS — Z79.899: ICD-10-CM

## 2021-09-27 DIAGNOSIS — Z88.1: ICD-10-CM

## 2021-09-27 DIAGNOSIS — M19.90: ICD-10-CM

## 2021-09-27 DIAGNOSIS — E78.00: ICD-10-CM

## 2021-09-27 DIAGNOSIS — I50.9: ICD-10-CM

## 2021-09-27 DIAGNOSIS — Z98.49: ICD-10-CM

## 2021-09-27 DIAGNOSIS — Z88.0: ICD-10-CM

## 2021-09-27 DIAGNOSIS — Z95.0: ICD-10-CM

## 2021-09-27 DIAGNOSIS — Z90.49: ICD-10-CM

## 2021-09-27 DIAGNOSIS — Z79.82: ICD-10-CM

## 2021-09-27 DIAGNOSIS — Z20.822: ICD-10-CM

## 2021-09-27 DIAGNOSIS — E87.6: ICD-10-CM

## 2021-09-27 PROCEDURE — 80053 COMPREHEN METABOLIC PANEL: CPT

## 2021-09-27 PROCEDURE — 84484 ASSAY OF TROPONIN QUANT: CPT

## 2021-09-27 PROCEDURE — 71045 X-RAY EXAM CHEST 1 VIEW: CPT

## 2021-09-27 PROCEDURE — 86920 COMPATIBILITY TEST SPIN: CPT

## 2021-09-27 PROCEDURE — 83880 ASSAY OF NATRIURETIC PEPTIDE: CPT

## 2021-09-27 PROCEDURE — 85025 COMPLETE CBC W/AUTO DIFF WBC: CPT

## 2021-09-27 PROCEDURE — C9113 INJ PANTOPRAZOLE SODIUM, VIA: HCPCS

## 2021-09-27 PROCEDURE — P9016 RBC LEUKOCYTES REDUCED: HCPCS

## 2021-09-27 PROCEDURE — 30233N1 TRANSFUSION OF NONAUTOLOGOUS RED BLOOD CELLS INTO PERIPHERAL VEIN, PERCUTANEOUS APPROACH: ICD-10-PCS | Performed by: STUDENT IN AN ORGANIZED HEALTH CARE EDUCATION/TRAINING PROGRAM

## 2021-09-27 PROCEDURE — 99285 EMERGENCY DEPT VISIT HI MDM: CPT

## 2021-09-27 PROCEDURE — 93005 ELECTROCARDIOGRAM TRACING: CPT

## 2021-09-27 PROCEDURE — 86901 BLOOD TYPING SEROLOGIC RH(D): CPT

## 2021-09-27 PROCEDURE — 86900 BLOOD TYPING SEROLOGIC ABO: CPT

## 2021-09-27 PROCEDURE — 82270 OCCULT BLOOD FECES: CPT

## 2021-09-27 PROCEDURE — U0002 COVID-19 LAB TEST NON-CDC: HCPCS

## 2021-09-27 PROCEDURE — 36415 COLL VENOUS BLD VENIPUNCTURE: CPT

## 2021-09-27 PROCEDURE — 86850 RBC ANTIBODY SCREEN: CPT

## 2021-09-27 PROCEDURE — C8929 TTE W OR WO FOL WCON,DOPPLER: HCPCS

## 2021-09-27 PROCEDURE — 96374 THER/PROPH/DIAG INJ IV PUSH: CPT

## 2021-09-27 PROCEDURE — 86922 COMPATIBILITY TEST ANTIGLOB: CPT

## 2021-09-27 NOTE — EDM.PDOC
ED HPI GENERAL MEDICAL PROBLEM





- General


Chief Complaint: General


Stated Complaint: LOW HEMOGLOBIN


Time Seen by Provider: 09/27/21 16:45


Source of Information: Reports: Patient


History Limitations: Reports: No Limitations





- History of Present Illness


INITIAL COMMENTS - FREE TEXT/NARRATIVE: 





Patient is an 88 YO WM who presented to the ED because of anemia. There was a 

drop in her HB frpm 10(0622/21) down to 7.8 today. He noticed that he started to

have dark stools which started 2 weeks. He also started taking eliquis 2.5 mg 

BID 2weeks ago. He is also taking 81 mg of aspirin for his cardiac issues. He 

also c/o weakness and insomnia 2 weeks. Denies having any abdominal pain, N/V 

but has some loose stools for months. There is no fever, chills,cough or cold.He

has an EGD/Colonoscopy done 1 and 1/2 mo ago but he doesn't know the result. 





- Related Data


                                    Allergies











Allergy/AdvReac Type Severity Reaction Status Date / Time


 


simvastatin [From Zocor] Allergy Unknown Other Verified 06/21/21 14:01


 


amoxicillin [From Augmentin] Allergy  Abdominal Verified 06/21/21 14:01





   Pain  


 


clavulanic acid Allergy  Abdominal Verified 06/21/21 14:01





[From Augmentin]   Pain  


 


sucralfate [From Carafate] Allergy  Itching Verified 06/21/21 14:01











Home Meds: 


                                    Home Meds





Rosuvastatin [Crestor] 20 mg PO BEDTIME 05/27/15 [History]


Aspirin [Ecotrin EC] 81 mg PO BEDTIME 11/22/18 [History]


Nitroglycerin [Nitrostat] 0.4 mg SL Q5M PRN 11/27/18 [History]


Sennosides/Docusate Sodium [Senna-S] 2 tab PO BID PRN 09/23/19 [History]


Isosorbide Mononitrate [Imdur] 30 mg PO DAILY 06/21/21 [History]


Pantoprazole Sodium [Protonix] 40 mg PO BID 06/21/21 [History]


carvediloL [Coreg] 6.25 mg PO BID 06/21/21 [History]


hydrOXYzine pamoate [Hydroxyzine Pamoate] 25 mg PO QID PRN 06/21/21 [History]


Bumetanide 0.5 mg PO DAILY@1400 30 Days #30 tablet 06/22/21 [Rx]


Bumetanide 1 mg PO DAILY@0800 30 Days #30 tab 06/22/21 [Rx]


Potassium Chloride 20 meq PO DAILY 30 Days #30 tab 06/22/21 [Rx]











Past Medical History





- Past Health History


Medical/Surgical History: Denies Medical/Surgical History


HEENT History: Reports: Impaired Vision


Other HEENT History: wears glasses


Cardiovascular History: Reports: Blood Clots/VTE/DVT, Bypass, CAD, 

Cardiomyopathy, Heart Failure, High Cholesterol, Hypertension, MI, Pacemaker, 

Other (See Below)


Other Cardiovascular History: DVT, PFO


Respiratory History: Reports: COPD, SOB


Gastrointestinal History: Reports: None, Other (See Below)


Other Gastrointestinal History: EGD recent, ulceration, on Carafate, anti-

coagulation stopped at that time


Genitourinary History: Reports: Renal Disease, Other (See Below)


Other Genitourinary History: renal infarct


OB/GYN History: Reports: None


Musculoskeletal History: Reports: Arthritis, Other (See Below)


Other Musculoskeletal History: sacroiliitis


Neurological History: Reports: None


Psychiatric History: Reports: None


Endocrine/Metabolic History: Reports: None


Hematologic History: Reports: None


Immunologic History: Reports: None


Oncologic (Cancer) History: Reports: None


Dermatologic History: Reports: None





- Infectious Disease History


Infectious Disease History: Reports: C-Difficile, Measles, Shingles





- Past Surgical History


Head Surgeries/Procedures: Reports: None


HEENT Surgical History: Reports: Adenoidectomy, Cataract Surgery, Tonsillectomy


Cardiovascular Surgical History: Reports: Coronary Artery Bypass, Coronary 

Artery Stent


Other Cardiovascular Surgeries/Procedures: 1 stent, triple bypass


Respiratory Surgical History: Reports: None


GI Surgical History: Reports: None, Appendectomy, Colonoscopy, EGD, Hernia, 

Inguinal


Male  Surgical History: Reports: Other (See Below)


Other Male  Surgeries/Procedures: renal angiogram


Musculoskeletal Surgical History: Reports: Arthroscopic Knee, Hip Replacement, 

Other (See Below)


Other Musculoskeletal Surgeries/Procedures:: bilat hip replacement





Social & Family History





- Family History


Family Medical History: No Pertinent Family History


GI: Reports: None





- Tobacco Use


Tobacco Use Status *Q: Never Tobacco User





- Caffeine Use


Caffeine Use: Reports: Coffee


Other Caffeine Use: 1-2 cups coffee/day


Caffeine Use Comment: Daily





- Recreational Drug Use


Recreational Drug Use: No


Other Recreational Drug Type: Patient uses medical marijuana pills.





- Living Situation & Occupation


Living situation: Reports: 


Occupation: Retired





ED ROS GENERAL





- Review of Systems


Review Of Systems: See Below


Constitutional: Reports: Weakness


HEENT: Reports: No Symptoms


Respiratory: Reports: No Symptoms


Cardiovascular: Reports: No Symptoms


Endocrine: Reports: No Symptoms


GI/Abdominal: Reports: No Symptoms, Black Stool


: Reports: No Symptoms


Musculoskeletal: Reports: No Symptoms


Skin: Reports: No Symptoms


Neurological: Reports: No Symptoms


Psychiatric: Reports: No Symptoms





ED EXAM, GENERAL





- Physical Exam


Exam: See Below


Exam Limited By: No Limitations


General Appearance: Alert, No Apparent Distress


Eye Exam: Bilateral Eye: Other (pale conjunctiva)


Nose: Normal Inspection, Normal Mucosa, No Blood


Throat/Mouth: Normal Inspection, Normal Lips, Normal Teeth, Normal Gums, Normal 

Oropharynx, Normal Voice, No Airway Compromise


Head: Atraumatic, Normocephalic


Neck: Normal Inspection, Supple, Non-Tender, Full Range of Motion


Respiratory/Chest: No Respiratory Distress, Lungs Clear, Normal Breath Sounds, 

No Accessory Muscle Use, Chest Non-Tender


Cardiovascular: Normal Peripheral Pulses, Regular Rate, Rhythm, No Edema, No 

Gallop, No JVD, No Murmur, No Rub


GI/Abdominal: Normal Bowel Sounds, Soft, Non-Tender, No Organomegaly, No 

Distention, No Abnormal Bruit


Back Exam: Normal Inspection, Full Range of Motion


Extremities: Normal Inspection, Normal Range of Motion, Non-Tender


Neurological: Alert, Oriented, CN II-XII Intact, Normal Cognition, Normal Gait


Psychiatric: Normal Affect


Skin Exam: Warm


  ** #1 Interpretation


EKG Date: 09/27/21


Time: 17:28


Rhythm: Other (Ventricular Paced Rhythm)


Axis: Normal


P-Wave: Present


QRS: Normal


ST-T: Normal


QT: Normal


Comparison: No Change


EKG Interpretation Comments: 





Pacemaker rhythm





Course





- Vital Signs


Text/Narrative:: 





Lab/EKG/CXR result was reviewed and discussed with patient


NS @ 75 ml/HR


Protonix 80 mg IV x1


Transfuse 2 U PRBC


Elevated troponin most likely from CHF and CAITY


Covid test-negative


Code Status: Full Code


Dr Bobo was consulted and he will do EGD and possible Colonoscopy tomorrow.


CAse was also discussed with Dr Vinson of [facility] who agreed with the above 

plan of care





Last Recorded V/S: 





                                Last Vital Signs











Temp  36.7 C   09/27/21 16:29


 


Pulse  64   09/27/21 16:29


 


Resp  20   09/27/21 16:29


 


BP  116/43 L  09/27/21 16:29


 


Pulse Ox  94 L  09/27/21 16:29














- Orders/Labs/Meds


Orders: 





                               Active Orders 24 hr











 Category Date Time Status


 


 RED BLOOD CELLS LP [BBK] Stat Lab  09/27/21 17:50 Results


 


 TYPE AND SCREEN [BBK] Stat Lab  09/27/21 17:50 Results


 


 Sodium Chloride 0.9% [Normal Saline] 1,000 ml Med  09/27/21 17:30 Active





 IV ASDIRECTED   


 


 Sodium Chloride 0.9% [Normal Saline] 250 ml Med  09/27/21 17:30 Active





 IV ASDIRECTED   


 


 Sodium Chloride 0.9% [Saline Flush] Med  09/27/21 17:27 Active





 10 ml FLUSH ASDIRECTED PRN   


 


 Saline Lock Insert [OM.PC] Routine Oth  09/27/21 17:27 Ordered


 


 Transfuse Red Blood Cells [COMM] Stat Oth  09/27/21 17:27 Ordered


 


 EKG 12 Lead [EK] Routine Ther  09/27/21 17:27 Ordered








                                Medication Orders





Carvedilol (Carvedilol 6.25 Mg Tab)  6.25 mg PO BID [facility]


Hydroxyzine HCl (Hydroxyzine Hcl 25 Mg Tab)  25 mg PO QID PRN


   PRN Reason: Itching


Sodium Chloride (Normal Saline)  250 mls @ 100 mls/hr IV ASDIRECTED ANMOL


Sodium Chloride (Normal Saline)  1,000 mls @ 75 mls/hr IV ASDIRECTED ANMOL


   Last Admin: 09/27/21 18:09  Dose: 75 mls/hr


   Documented by: KENNA


Isosorbide Mononitrate (Isosorbide Mononitrate 30 Mg Tab.Er)  30 mg PO DAILY [facility]


Morphine Sulfate (Morphine 2 Mg/Ml Syringe)  2 mg IVPUSH Q4H PRN


   PRN Reason: Pain


Nitroglycerin (Nitroglycerin 0.4 Mg Tab.Sl)  0.4 mg SL Q5M PRN


   PRN Reason: Chest Pain


Ondansetron HCl (Ondansetron 4 Mg/2 Ml Sdv)  4 mg IV Q4H PRN


   PRN Reason: Nausea/Vomiting


Pantoprazole Sodium (Pantoprazole 40 Mg Vial)  40 mg IVPUSH DAILY ANMOL


Potassium Chloride (Potassium Chloride 20 Meq Tab.Er)  20 meq PO DAILY ANMOL


Rosuvastatin Calcium (Rosuvastatin 20 Mg Tab)  20 mg PO BEDTIME ANMOL


Senna/Docusate Sodium (Docusate Sodium/Sennosides 50-8.6 Mg Tab)  1 tab PO BID 

PRN


   PRN Reason: Constipation


Sodium Chloride (Sodium Chloride 0.9% 10 Ml Syringe)  10 ml FLUSH ASDIRECTED PRN


   PRN Reason: Keep Vein Open


Zolpidem Tartrate (Zolpidem 5 Mg Tab)  5 mg PO BEDTIME PRN


   PRN Reason: Sleep








Labs: 





                                Laboratory Tests











  09/27/21 09/27/21 09/27/21 Range/Units





  17:50 17:50 17:50 


 


WBC  5.6    (3.2-10.1)  x10-3/uL


 


RBC  2.71 L    (3.90-5.90)  x10(6)uL


 


Hgb  7.8 L    (12.9-17.7)  g/dL


 


Hct  24.9 L    (38.3-50.1)  %


 


MCV  91.9    (80.8-98.7)  fL


 


MCH  28.7    (27.0-33.3)  pg


 


MCHC  31.2    (28.7-35.3)  g/dL


 


RDW  18.9 H    (12.4-15.0)  %


 


Plt Count  175    (117-477)  x10(3)uL


 


MPV  9.0    (6.7-11.0)  fL


 


Neut % (Auto)  72.3 H    (40.3-71.8)  %


 


Lymph % (Auto)  14.5 L    (15.8-45.3)  %


 


Mono % (Auto)  11.4    (5.5-15.2)  %


 


Eos % (Auto)  1.2    (0.1-6.8)  %


 


Baso % (Auto)  0.6    (0.3-3.8)  %


 


Neut # (Auto)  4.0    (1.7-6.9)  x10-3/uL


 


Lymph # (Auto)  0.8    (0.5-4.5)  x10-3/uL


 


Mono # (Auto)  0.6    (0.0-1.2)  x10-3/uL


 


Eos # (Auto)  0.1    (0.0-0.6)  x10-3/uL


 


Baso # (Auto)  0.0    (0.0-0.3)  x10-3/uL


 


Sodium   137   (135-145)  mmol/L


 


Potassium   3.7   (3.5-5.3)  mmol/L


 


Chloride   101   (100-110)  mmol/L


 


Carbon Dioxide   24   (21-32)  mmol/L


 


BUN   53 H D   (7-18)  mg/dL


 


Creatinine   2.3 H*   (0.70-1.30)  mg/dL


 


Est Cr Clr Drug Dosing   21.16   mL/min


 


Estimated GFR (MDRD)   27 L   (>60)  


 


BUN/Creatinine Ratio   23.0 H   (9-20)  


 


Glucose   91   ()  mg/dL


 


Calcium   9.0   (8.6-10.2)  mg/dL


 


Total Bilirubin   1.0   (0.1-1.3)  mg/dL


 


AST   24  D   (5-25)  IU/L


 


ALT   33  D   (12-36)  U/L


 


Alkaline Phosphatase   160 H   ()  IU/L


 


Troponin I    107.4 H*  (4.0-60.3)  pg/mL


 


NT-Pro-B Natriuret Pep    18841 H*  (<=450)  pg/mL


 


Total Protein   6.9   (6.0-8.0)  g/dL


 


Albumin   3.5   (3.2-4.6)  g/dL


 


Globulin   3.4   g/dL


 


Albumin/Globulin Ratio   1.0   


 


SARS-CoV-2 RNA (MICHAEL)     (NEGATIVE)  


 


Blood Type     


 


Gel Antibody Screen     


 


Crossmatch     














  09/27/21 09/27/21 Range/Units





  17:50 18:20 


 


WBC    (3.2-10.1)  x10-3/uL


 


RBC    (3.90-5.90)  x10(6)uL


 


Hgb    (12.9-17.7)  g/dL


 


Hct    (38.3-50.1)  %


 


MCV    (80.8-98.7)  fL


 


MCH    (27.0-33.3)  pg


 


MCHC    (28.7-35.3)  g/dL


 


RDW    (12.4-15.0)  %


 


Plt Count    (117-477)  x10(3)uL


 


MPV    (6.7-11.0)  fL


 


Neut % (Auto)    (40.3-71.8)  %


 


Lymph % (Auto)    (15.8-45.3)  %


 


Mono % (Auto)    (5.5-15.2)  %


 


Eos % (Auto)    (0.1-6.8)  %


 


Baso % (Auto)    (0.3-3.8)  %


 


Neut # (Auto)    (1.7-6.9)  x10-3/uL


 


Lymph # (Auto)    (0.5-4.5)  x10-3/uL


 


Mono # (Auto)    (0.0-1.2)  x10-3/uL


 


Eos # (Auto)    (0.0-0.6)  x10-3/uL


 


Baso # (Auto)    (0.0-0.3)  x10-3/uL


 


Sodium    (135-145)  mmol/L


 


Potassium    (3.5-5.3)  mmol/L


 


Chloride    (100-110)  mmol/L


 


Carbon Dioxide    (21-32)  mmol/L


 


BUN    (7-18)  mg/dL


 


Creatinine    (0.70-1.30)  mg/dL


 


Est Cr Clr Drug Dosing    mL/min


 


Estimated GFR (MDRD)    (>60)  


 


BUN/Creatinine Ratio    (9-20)  


 


Glucose    ()  mg/dL


 


Calcium    (8.6-10.2)  mg/dL


 


Total Bilirubin    (0.1-1.3)  mg/dL


 


AST    (5-25)  IU/L


 


ALT    (12-36)  U/L


 


Alkaline Phosphatase    ()  IU/L


 


Troponin I    (4.0-60.3)  pg/mL


 


NT-Pro-B Natriuret Pep    (<=450)  pg/mL


 


Total Protein    (6.0-8.0)  g/dL


 


Albumin    (3.2-4.6)  g/dL


 


Globulin    g/dL


 


Albumin/Globulin Ratio    


 


SARS-CoV-2 RNA (MICHAEL)   Negative  (NEGATIVE)  


 


Blood Type  O POSITIVE   


 


Gel Antibody Screen  Negative   


 


Crossmatch  See Detail   











Meds: 





Medications











Generic Name Dose Route Start Last Admin





  Trade Name Freq  PRN Reason Stop Dose Admin


 


Carvedilol  6.25 mg  09/27/21 21:00 





  Carvedilol 6.25 Mg Tab  PO  





  BID ANMOL  


 


Hydroxyzine HCl  25 mg  09/27/21 19:17 





  Hydroxyzine Hcl 25 Mg Tab  PO  





  QID PRN  





  Itching  


 


Sodium Chloride  250 mls @ 100 mls/hr  09/27/21 17:30 





  Normal Saline  IV  





  ASDIRECTED ANMOL  


 


Sodium Chloride  1,000 mls @ 75 mls/hr  09/27/21 17:30  09/27/21 18:09





  Normal Saline  IV   75 mls/hr





  ASDIRECTED ANMOL   Administration


 


Isosorbide Mononitrate  30 mg  09/28/21 09:00 





  Isosorbide Mononitrate 30 Mg Tab.Er  PO  





  DAILY ANMOL  


 


Morphine Sulfate  2 mg  09/27/21 19:11 





  Morphine 2 Mg/Ml Syringe  IVPUSH  





  Q4H PRN  





  Pain  


 


Nitroglycerin  0.4 mg  09/27/21 19:17 





  Nitroglycerin 0.4 Mg Tab.Sl  SL  





  Q5M PRN  





  Chest Pain  


 


Ondansetron HCl  4 mg  09/27/21 19:11 





  Ondansetron 4 Mg/2 Ml Sdv  IV  





  Q4H PRN  





  Nausea/Vomiting  


 


Pantoprazole Sodium  40 mg  09/28/21 09:00 





  Pantoprazole 40 Mg Vial  IVPUSH  





  DAILY [facility]  


 


Potassium Chloride  20 meq  09/28/21 09:00 





  Potassium Chloride 20 Meq Tab.Er  PO  





  DAILY [facility]  


 


Rosuvastatin Calcium  20 mg  09/27/21 21:00 





  Rosuvastatin 20 Mg Tab  PO  





  BEDTIME ANMOL  


 


Senna/Docusate Sodium  1 tab  09/27/21 19:11 





  Docusate Sodium/Sennosides 50-8.6 Mg Tab  PO  





  BID PRN  





  Constipation  


 


Sodium Chloride  10 ml  09/27/21 17:27 





  Sodium Chloride 0.9% 10 Ml Syringe  FLUSH  





  ASDIRECTED PRN  





  Keep Vein Open  


 


Zolpidem Tartrate  5 mg  09/27/21 19:11 





  Zolpidem 5 Mg Tab  PO  





  BEDTIME PRN  





  Sleep  














Discontinued Medications














Generic Name Dose Route Start Last Admin





  Trade Name Freq  PRN Reason Stop Dose Admin


 


Pantoprazole Sodium  80 mg  09/27/21 18:04  09/27/21 18:17





  Pantoprazole 40 Mg Vial  IVPUSH  09/27/21 18:05  80 mg





  ONETIME ONE   Administration














Departure





- Departure


Time of Disposition: 19:00


Disposition: Admitted As Inpatient 66


Condition: Good


Clinical Impression: 


 Anemia due to GI blood loss, Dehydration, Acute kidney injury superimposed on 

CKD, Elevated troponin





CHF (congestive heart failure)


Qualifiers:


 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 








- Discharge Information





Sepsis Event Note (ED)





- Evaluation


Sepsis Screening Result: No Definite Risk





- Focused Exam


Vital Signs: 





                                   Vital Signs











  Temp Pulse Resp BP Pulse Ox


 


 09/27/21 16:29  36.7 C  64  20  116/43 L  94 L














- My Orders


Last 24 Hours: 





My Active Orders





09/27/21 17:27


Sodium Chloride 0.9% [Saline Flush]   10 ml FLUSH ASDIRECTED PRN 


Saline Lock Insert [OM.PC] Routine 


Transfuse Red Blood Cells [COMM] Stat 


EKG 12 Lead [EK] Routine 





09/27/21 17:30


Sodium Chloride 0.9% [Normal Saline] 1,000 ml IV ASDIRECTED 


Sodium Chloride 0.9% [Normal Saline] 250 ml IV ASDIRECTED 





09/27/21 17:50


RED BLOOD CELLS LP [BBK] Stat 


TYPE AND SCREEN [BBK] Stat 














- Assessment/Plan


Last 24 Hours: 





My Active Orders





09/27/21 17:27


Sodium Chloride 0.9% [Saline Flush]   10 ml FLUSH ASDIRECTED PRN 


Saline Lock Insert [OM.PC] Routine 


Transfuse Red Blood Cells [COMM] Stat 


EKG 12 Lead [EK] Routine 





09/27/21 17:30


Sodium Chloride 0.9% [Normal Saline] 1,000 ml IV ASDIRECTED 


Sodium Chloride 0.9% [Normal Saline] 250 ml IV ASDIRECTED 





09/27/21 17:50


RED BLOOD CELLS LP [BBK] Stat 


TYPE AND SCREEN [BBK] Stat

## 2021-09-27 NOTE — CR
INDICATION:  Dyspnea.  History of CHF. 



CHEST, ONE VIEW:  AP upright portable view of the chest 09/27/21 was compared 
with 06/21/21 and 11/18/19.



Bipolar pacemaker leads are unchanged in position. 



Evidence of previous median sternotomy with surgery is noted.  



Densities in the lungs are unchanged from 06/21/21, compatible with areas of 
sclerosis-fibrosis.  It is difficult to exclude minimal active disease - 
pneumonia and pleuritis or even a nodular mass at the left lung base with a 
somewhat nodular density seen in that area.  The right lung and pleural space 
were relatively unremarkable.  



IMPRESSION:  Little interval change - no definite acute process, but difficult 
to exclude minimal pneumonia and pleuritis at the left lung base or even a 
neoplastic process.

MTDD

## 2021-09-28 RX ADMIN — Medication PRN ML: at 14:04

## 2021-09-28 RX ADMIN — Medication PRN ML: at 10:00

## 2021-09-28 NOTE — PCM.HP.2
H&P History of Present Illness





- General


Date of Service: 09/28/21


Admit Problem/Dx: 


                           Admission Diagnosis/Problem





Admission Diagnosis/Problem      Anemia








Source of Information: Patient, Old Records, Provider


History Limitations: Reports: No Limitations





- History of Present Illness


Initial Comments - Free Text/Narative: 


87-year-old gentleman was sent to the emergency department because his primary 

care physician found that he had significant anemia.  His hemoglobin dropped 

from approximately 10.6-7.8 today.  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 

preduodenal angioectasis.  The anteriorectasis was treated with laser therapy at

that time.  Further lab work in the emergency department noted acute kidney 

injury with elevated troponin.  Patient was admitted for EGD and red blood cell 

transfusion.  Related past medical history includes coronary artery disease, 

ischemic cardiomyopathy, paroxysmal atrial fibrillation, and history of NSTEMI. 

Patient had echocardiogram earlier this year that showed improved ejection 

fraction to approximately 40 to 45%.  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





  ** Sciatica


Pain Score (Numeric/FACES): 4





- Related Data


Allergies/Adverse Reactions: 


                                    Allergies











Allergy/AdvReac Type Severity Reaction Status Date / Time


 


simvastatin [From Zocor] Allergy Unknown Other Verified 09/28/21 01:43


 


amoxicillin [From Augmentin] Allergy  Abdominal Verified 09/28/21 01:43





   Pain  


 


clavulanic acid Allergy  Abdominal Verified 09/28/21 01:43





[From Augmentin]   Pain  


 


sucralfate [From Carafate] Allergy  Itching Verified 09/28/21 01:43











Home Medications: 


                                    Home Meds





Aspirin [Ecotrin EC] 81 mg PO BEDTIME 11/22/18 [History]


Nitroglycerin [Nitrostat] 0.4 mg SL Q5M PRN 11/27/18 [History]


Isosorbide Mononitrate [Imdur] 30 mg PO DAILY 06/21/21 [History]


Pantoprazole Sodium [Protonix] 40 mg PO BID 06/21/21 [History]


carvediloL [Coreg] 6.25 mg PO BID 06/21/21 [History]


Acetaminophen [Acetaminophen Extra Strength] 1,000 mg PO Q6HR PRN 09/28/21 

[History]


Amoxicillin 500 mg PO ASDIRECTED PRN 09/28/21 [History]


Apixaban [Eliquis] 2.5 mg PO BID 09/28/21 [History]


Bumetanide 3 mg PO DAILY 09/28/21 [History]


Cyanocobalamin (Vitamin B-12) [B-12] 1,000 mcg PO DAILY 09/28/21 [History]


Diclofenac Sodium [Voltaren 1% Gel] 2 gm TOP QID PRN 09/28/21 [History]


Melatonin 3 mg PO BEDTIME 09/28/21 [History]


Potassium Chloride 30 meq PO DAILY 09/28/21 [History]


Ubidecarenone [Coenzyme Q10] 100 mg PO DAILY 09/28/21 [History]


metOLazone [Metolazone] 2.5 mg PO WEEKLY PRN 09/28/21 [History]


traZODone 25 mg PO BEDTIME PRN 09/28/21 [History]











Past Medical History





- Past Health History


Medical/Surgical History: Denies Medical/Surgical History


HEENT History: Reports: Impaired Vision


Other HEENT History: wears glasses


Cardiovascular History: Reports: Blood Clots/VTE/DVT, Bypass, CAD, 

Cardiomyopathy, Heart Failure, High Cholesterol, Hypertension, MI, Pacemaker, 

Other (See Below)


Other Cardiovascular History: DVT, PFO


Respiratory History: Reports: COPD, SOB


Gastrointestinal History: Reports: None, Other (See Below)


Other Gastrointestinal History: EGD recent, ulceration, on Carafate, anti-

coagulation stopped at that time


Genitourinary History: Reports: Renal Disease, Other (See Below)


Other Genitourinary History: renal infarct


OB/GYN History: Reports: None


Musculoskeletal History: Reports: Arthritis, Other (See Below)


Other Musculoskeletal History: sacroiliitis


Neurological History: Reports: None


Psychiatric History: Reports: None


Endocrine/Metabolic History: Reports: None


Hematologic History: Reports: None


Immunologic History: Reports: None


Oncologic (Cancer) History: Reports: None


Dermatologic History: Reports: None





- Infectious Disease History


Infectious Disease History: Reports: C-Difficile, Measles, Shingles





- Past Surgical History


Head Surgeries/Procedures: Reports: None


HEENT Surgical History: Reports: Adenoidectomy, Cataract Surgery, Tonsillectomy


Cardiovascular Surgical History: Reports: Coronary Artery Bypass, Coronary 

Artery Stent


Other Cardiovascular Surgeries/Procedures: 1 stent, triple bypass


Respiratory Surgical History: Reports: None


GI Surgical History: Reports: None, Appendectomy, Colonoscopy, EGD, Hernia, 

Inguinal


Male  Surgical History: Reports: Other (See Below)


Other Male  Surgeries/Procedures: renal angiogram


Musculoskeletal Surgical History: Reports: Arthroscopic Knee, Hip Replacement, 

Other (See Below)


Other Musculoskeletal Surgeries/Procedures:: bilat hip replacement





Social & Family History





- Family History


Family Medical History: No Pertinent Family History


GI: Reports: None





- Tobacco Use


Tobacco Use Status *Q: Never Tobacco User


Second Hand Smoke Exposure: No





- Caffeine Use


Caffeine Use: Reports: Coffee


Other Caffeine Use: 1-2 cups coffee/day


Caffeine Use Comment: Daily





- Recreational Drug Use


Recreational Drug Use: No


Other Recreational Drug Type: Patient uses medical marijuana pills.





- Living Situation & Occupation


Living situation: Reports: 


Occupation: Retired





H&P Review of Systems





- Review of Systems:


Review Of Systems: See Below


General: Reports: Weakness


HEENT: Reports: No Symptoms


Pulmonary: Reports: No Symptoms


Cardiovascular: Reports: No Symptoms


Gastrointestinal: Reports: No Symptoms


Genitourinary: Reports: No Symptoms


Musculoskeletal: Reports: No Symptoms


Skin: Reports: No Symptoms


Psychiatric: Reports: No Symptoms


Hematologic/Lymphatic: Reports: No Symptoms


Immunologic: Reports: No Symptoms





Exam





- Exam


Exam: See Below





- Vital Signs


Vital Signs: 


                                Last Vital Signs











Temp  36.4 C   09/28/21 03:26


 


Pulse  68   09/28/21 03:26


 


Resp  16   09/28/21 03:26


 


BP  110/64   09/28/21 03:26


 


Pulse Ox  98   09/28/21 03:26











Weight: 70.171 kg





- Exam


Quality Assessment: Supplemental Oxygen, Other (Patient was on Eliquis secondary

 to atrial fibrillation but Eliquis held secondary to likely GI bleed)


General: Alert, Oriented, Cooperative


HEENT: EOMI


Neck: Supple


Lungs: Normal Respiratory Effort, Crackles.  No: Wheezing


Cardiovascular: Regular Rate, Regular Rhythm, Systolic Murmur


GI/Abdominal Exam: Normal Bowel Sounds, Soft, Tender, Other (Periumbilical 

tenderness likely secondary to hernia)


Back Exam: No: CVA Tenderness (R), CVA Tenderness (L)


Extremities: Pedal Edema


Peripheral Pulses: 1+: Dorsalis Pedis (L), Dorsalis Pedis (R), 2+: Radial (L), 

Radial (R)


Skin: Warm, Dry


Neurological: Cranial Nerves Intact


Neuro Extensive - Mental Status: Alert, Oriented x3, Normal Mood/Affect


Neuro Extensive - Motor, Sensory, Reflexes: CN II-XII Intact, Normal Gait


Psychiatric: Alert, Normal Affect, Normal Mood





- Patient Data


Lab Results Last 24 hrs: 


                         Laboratory Results - last 24 hr











  09/27/21 09/27/21 09/27/21 Range/Units





  17:50 17:50 17:50 


 


WBC  5.6    (3.2-10.1)  x10-3/uL


 


RBC  2.71 L    (3.90-5.90)  x10(6)uL


 


Hgb  7.8 L    (12.9-17.7)  g/dL


 


Hct  24.9 L    (38.3-50.1)  %


 


MCV  91.9    (80.8-98.7)  fL


 


MCH  28.7    (27.0-33.3)  pg


 


MCHC  31.2    (28.7-35.3)  g/dL


 


RDW  18.9 H    (12.4-15.0)  %


 


Plt Count  175    (117-477)  x10(3)uL


 


MPV  9.0    (6.7-11.0)  fL


 


Neut % (Auto)  72.3 H    (40.3-71.8)  %


 


Lymph % (Auto)  14.5 L    (15.8-45.3)  %


 


Mono % (Auto)  11.4    (5.5-15.2)  %


 


Eos % (Auto)  1.2    (0.1-6.8)  %


 


Baso % (Auto)  0.6    (0.3-3.8)  %


 


Neut # (Auto)  4.0    (1.7-6.9)  x10-3/uL


 


Lymph # (Auto)  0.8    (0.5-4.5)  x10-3/uL


 


Mono # (Auto)  0.6    (0.0-1.2)  x10-3/uL


 


Eos # (Auto)  0.1    (0.0-0.6)  x10-3/uL


 


Baso # (Auto)  0.0    (0.0-0.3)  x10-3/uL


 


Sodium   137   (135-145)  mmol/L


 


Potassium   3.7   (3.5-5.3)  mmol/L


 


Chloride   101   (100-110)  mmol/L


 


Carbon Dioxide   24   (21-32)  mmol/L


 


BUN   53 H D   (7-18)  mg/dL


 


Creatinine   2.3 H*   (0.70-1.30)  mg/dL


 


Est Cr Clr Drug Dosing   21.16   mL/min


 


Estimated GFR (MDRD)   27 L   (>60)  


 


BUN/Creatinine Ratio   23.0 H   (9-20)  


 


Glucose   91   ()  mg/dL


 


Calcium   9.0   (8.6-10.2)  mg/dL


 


Total Bilirubin   1.0   (0.1-1.3)  mg/dL


 


AST   24  D   (5-25)  IU/L


 


ALT   33  D   (12-36)  U/L


 


Alkaline Phosphatase   160 H   ()  IU/L


 


Troponin I    107.4 H*  (4.0-60.3)  pg/mL


 


NT-Pro-B Natriuret Pep    22615 H*  (<=450)  pg/mL


 


Total Protein   6.9   (6.0-8.0)  g/dL


 


Albumin   3.5   (3.2-4.6)  g/dL


 


Globulin   3.4   g/dL


 


Albumin/Globulin Ratio   1.0   


 


SARS-CoV-2 RNA (MICHAEL)     (NEGATIVE)  


 


Blood Type     


 


Gel Antibody Screen     


 


Crossmatch     














  09/27/21 09/27/21 09/28/21 Range/Units





  17:50 18:20 00:05 


 


WBC     (3.2-10.1)  x10-3/uL


 


RBC     (3.90-5.90)  x10(6)uL


 


Hgb     (12.9-17.7)  g/dL


 


Hct     (38.3-50.1)  %


 


MCV     (80.8-98.7)  fL


 


MCH     (27.0-33.3)  pg


 


MCHC     (28.7-35.3)  g/dL


 


RDW     (12.4-15.0)  %


 


Plt Count     (117-477)  x10(3)uL


 


MPV     (6.7-11.0)  fL


 


Neut % (Auto)     (40.3-71.8)  %


 


Lymph % (Auto)     (15.8-45.3)  %


 


Mono % (Auto)     (5.5-15.2)  %


 


Eos % (Auto)     (0.1-6.8)  %


 


Baso % (Auto)     (0.3-3.8)  %


 


Neut # (Auto)     (1.7-6.9)  x10-3/uL


 


Lymph # (Auto)     (0.5-4.5)  x10-3/uL


 


Mono # (Auto)     (0.0-1.2)  x10-3/uL


 


Eos # (Auto)     (0.0-0.6)  x10-3/uL


 


Baso # (Auto)     (0.0-0.3)  x10-3/uL


 


Sodium     (135-145)  mmol/L


 


Potassium     (3.5-5.3)  mmol/L


 


Chloride     (100-110)  mmol/L


 


Carbon Dioxide     (21-32)  mmol/L


 


BUN     (7-18)  mg/dL


 


Creatinine     (0.70-1.30)  mg/dL


 


Est Cr Clr Drug Dosing     mL/min


 


Estimated GFR (MDRD)     (>60)  


 


BUN/Creatinine Ratio     (9-20)  


 


Glucose     ()  mg/dL


 


Calcium     (8.6-10.2)  mg/dL


 


Total Bilirubin     (0.1-1.3)  mg/dL


 


AST     (5-25)  IU/L


 


ALT     (12-36)  U/L


 


Alkaline Phosphatase     ()  IU/L


 


Troponin I    94.2 H*  (4.0-60.3)  pg/mL


 


NT-Pro-B Natriuret Pep     (<=450)  pg/mL


 


Total Protein     (6.0-8.0)  g/dL


 


Albumin     (3.2-4.6)  g/dL


 


Globulin     g/dL


 


Albumin/Globulin Ratio     


 


SARS-CoV-2 RNA (MICHAEL)   Negative   (NEGATIVE)  


 


Blood Type  O POSITIVE    


 


Gel Antibody Screen  Negative    


 


Crossmatch  See Detail    














  09/28/21 09/28/21 09/28/21 Range/Units





  06:50 06:50 06:50 


 


WBC  3.9    (3.2-10.1)  x10-3/uL


 


RBC  3.18 L    (3.90-5.90)  x10(6)uL


 


Hgb  9.3 L    (12.9-17.7)  g/dL


 


Hct  28.4 L    (38.3-50.1)  %


 


MCV  89.6    (80.8-98.7)  fL


 


MCH  29.2    (27.0-33.3)  pg


 


MCHC  32.5    (28.7-35.3)  g/dL


 


RDW  17.8 H    (12.4-15.0)  %


 


Plt Count  125    (117-477)  x10(3)uL


 


MPV  8.8    (6.7-11.0)  fL


 


Neut % (Auto)  68.9    (40.3-71.8)  %


 


Lymph % (Auto)  16.0    (15.8-45.3)  %


 


Mono % (Auto)  12.5    (5.5-15.2)  %


 


Eos % (Auto)  2.0    (0.1-6.8)  %


 


Baso % (Auto)  0.6    (0.3-3.8)  %


 


Neut # (Auto)  2.7    (1.7-6.9)  x10-3/uL


 


Lymph # (Auto)  0.6    (0.5-4.5)  x10-3/uL


 


Mono # (Auto)  0.5    (0.0-1.2)  x10-3/uL


 


Eos # (Auto)  0.1    (0.0-0.6)  x10-3/uL


 


Baso # (Auto)  0.0    (0.0-0.3)  x10-3/uL


 


Sodium   138   (135-145)  mmol/L


 


Potassium   3.3 L   (3.5-5.3)  mmol/L


 


Chloride   104   (100-110)  mmol/L


 


Carbon Dioxide   22   (21-32)  mmol/L


 


BUN   48 H   (7-18)  mg/dL


 


Creatinine   2.1 H*   (0.70-1.30)  mg/dL


 


Est Cr Clr Drug Dosing   23.17   mL/min


 


Estimated GFR (MDRD)   30 L   (>60)  


 


BUN/Creatinine Ratio   22.9 H   (9-20)  


 


Glucose   78 L   ()  mg/dL


 


Calcium   8.6   (8.6-10.2)  mg/dL


 


Total Bilirubin     (0.1-1.3)  mg/dL


 


AST     (5-25)  IU/L


 


ALT     (12-36)  U/L


 


Alkaline Phosphatase     ()  IU/L


 


Troponin I    102.4 H*  (4.0-60.3)  pg/mL


 


NT-Pro-B Natriuret Pep     (<=450)  pg/mL


 


Total Protein     (6.0-8.0)  g/dL


 


Albumin     (3.2-4.6)  g/dL


 


Globulin     g/dL


 


Albumin/Globulin Ratio     


 


SARS-CoV-2 RNA (MICHAEL)     (NEGATIVE)  


 


Blood Type     


 


Gel Antibody Screen     


 


Crossmatch     











Result Diagrams: 


                                 09/28/21 06:50





                                 09/28/21 06:50


Seamus Results Last 24 hrs: 


                                  Microbiology











 09/27/21 18:26 Occult Blood - Final





 Stool / Feces 














Sepsis Event Note





- Evaluation


Sepsis Screening Result: No Definite Risk





- Focused Exam


Vital Signs: 


                                   Vital Signs











  Temp Temp Pulse Pulse Resp BP BP


 


 09/28/21 03:26  36.4 C    68  16   110/64


 


 09/28/21 03:00   36.6 C   62  16   110/62


 


 09/28/21 02:27  36.8 C    72  16   116/60


 


 09/27/21 23:42  36.9 C    60  16   106/54 L


 


 09/27/21 23:28  36.9 C    63  16   109/54 L


 


 09/27/21 22:47  36.7 C    74  16   111/57 L


 


 09/27/21 20:50    65    124/64 


 


 09/27/21 20:44  36.8 C    72  16   121/62


 


 09/27/21 20:25  36.9 C    65  18   117/56 L














  Pulse Ox


 


 09/28/21 03:26  98


 


 09/28/21 03:00  98


 


 09/28/21 02:27  98


 


 09/27/21 23:42  97


 


 09/27/21 23:28  96


 


 09/27/21 22:47  97


 


 09/27/21 20:50 


 


 09/27/21 20:44  98


 


 09/27/21 20:25  97














- Problem List


(1) Acute kidney injury superimposed on CKD


SNOMED Code(s): 06077425


   ICD Code: N17.9 - ACUTE KIDNEY FAILURE, UNSPECIFIED; N18.9 - CHRONIC KIDNEY 

DISEASE, UNSPECIFIED   Status: Acute   Current Visit: Yes   





(2) Anemia due to GI blood loss


SNOMED Code(s): 210718398, 739273905


   ICD Code: D50.0 - IRON DEFICIENCY ANEMIA SECONDARY TO BLOOD LOSS (CHRONIC)   

Status: Acute   Current Visit: Yes   





(3) Elevated troponin


SNOMED Code(s): 674470223, 859537978, 466869977


   ICD Code: R77.8 - OTHER SPECIFIED ABNORMALITIES OF PLASMA PROTEINS   Status: 

Acute   Current Visit: Yes   





(4) CHF (congestive heart failure)


SNOMED Code(s): 62853579


   ICD Code: I50.9 - HEART FAILURE, UNSPECIFIED   Status: Chronic   Current 

Visit: Yes   


Qualifiers: 


   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 





(5) Peripheral edema


SNOMED Code(s): 608934454


   ICD Code: R60.9 - EDEMA, UNSPECIFIED   Status: Acute   Current Visit: No   





(6) Cardiac pacemaker in situ


SNOMED Code(s): 220777455


   ICD Code: Z95.0 - PRESENCE OF CARDIAC PACEMAKER   Status: Chronic   Current 

Visit: No   





(7) HTN (hypertension)


SNOMED Code(s): 07891924


   ICD Code: I10 - ESSENTIAL (PRIMARY) HYPERTENSION   Status: Chronic   Current 

Visit: No   


Qualifiers: 


   Hypertension type: essential hypertension   Qualified Code(s): I10 - 

Essential (primary) hypertension   





(8) Hypokalemia


SNOMED Code(s): 84999234


   ICD Code: E87.6 - HYPOKALEMIA   Status: Acute   Current Visit: Yes   


Problem List Initiated/Reviewed/Updated: Yes


Orders Last 24hrs: 


                               Active Orders 24 hr











 Category Date Time Status


 


 Patient Status [ADT] Routine ADT  09/27/21 19:11 Active


 


 Patient Status [ADT] Routine ADT  09/28/21 08:19 Ordered


 


 Antiembolic Devices [RC] .Routine Care  09/28/21 08:19 Ordered


 


 Cardiac Monitoring [RC] CONTINUOUS Care  09/27/21 19:13 Active


 


 EKG Documentation Completion [RC] ASDIRECTED Care  09/28/21 07:32 Active


 


 Height and Weight [RC] DAILY Care  09/27/21 19:11 Active


 


 Intake and Output [RC] 06,14,22 Care  09/27/21 19:13 Active


 


 Oxygen Therapy [RC] .PRN Care  09/27/21 19:11 Active


 


 Pulse Oximetry [RC] CONTINUOUS Care  09/27/21 19:13 Active


 


 Pulse Oximetry [RC] PRN Care  09/28/21 08:19 Ordered


 


 Up With Assistance [RC] ASDIRECTED Care  09/27/21 19:11 Active


 


 VTE/DVT Education [RC] Click to Edit Care  09/28/21 08:19 Ordered


 


 VTE/DVT Education [RC] Per Unit Routine Care  09/27/21 19:11 Active


 


 Vital Signs [RC] 00,04,08,12,16,20 Care  09/27/21 19:11 Active


 


 Vital Signs [RC] Q4H Care  09/28/21 08:19 Ordered


 


 Nothing per Oral After Midnight Diet [DIET] Diet  09/27/21 Dinner Active


 


 Echo Comp wo Cont [US] Routine Exams  09/28/21 07:33 Ordered


 


 TROPONIN I [CHEM] Routine Lab  09/28/21 11:00 Ordered


 


 Docusate Sodium/Sennosides [Senna Plus] Med  09/27/21 19:11 Active





 1 tab PO BID PRN   


 


 Isosorbide Mononitrate [Imdur] Med  09/28/21 09:00 Active





 30 mg PO DAILY   


 


 Morphine Med  09/27/21 19:11 Active





 2 mg IVPUSH Q4H PRN   


 


 Nitroglycerin [Nitrostat] Med  09/27/21 19:17 Active





 0.4 mg SL Q5M PRN   


 


 Ondansetron [Zofran] Med  09/27/21 19:11 Active





 4 mg IV Q4H PRN   


 


 Pantoprazole [ProTONIX IV***] Med  09/28/21 09:00 Active





 40 mg IVPUSH DAILY   


 


 Potassium Chloride [Klor-Con M20] Med  09/28/21 09:00 Active





 20 meq PO DAILY   


 


 Sodium Chloride 0.9% [Normal Saline] 1,000 ml Med  09/27/21 17:30 Active





 IV ASDIRECTED   


 


 Sodium Chloride 0.9% [Normal Saline] 250 ml Med  09/27/21 17:30 Active





 IV ASDIRECTED   


 


 Sodium Chloride 0.9% [Saline Flush] Med  09/27/21 17:27 Active





 10 ml FLUSH ASDIRECTED PRN   


 


 Zolpidem [Ambien] Med  09/27/21 19:11 Active





 5 mg PO BEDTIME PRN   


 


 carvediloL [Coreg] Med  09/27/21 21:00 Active





 6.25 mg PO BID   


 


 hydrOXYzine HCL [Atarax] Med  09/27/21 19:17 Active





 25 mg PO QID PRN   


 


 DVT/VTE Prophylaxis Reflex [OM.PC] Per Unit Routine Oth  09/28/21 08:19 Ordered


 


 Saline Lock Insert [OM.PC] Routine Oth  09/27/21 17:27 Ordered


 


 Sequential Compression Device [OM.PC] Per Unit Routine Oth  09/27/21 19:13 

Ordered


 


 Transfuse Red Blood Cells [COMM] Stat Oth  09/27/21 17:27 Ordered


 


 Resuscitation Status Routine Resus Stat  09/27/21 19:11 Ordered


 


 EKG 12 Lead [EK] Routine Ther  09/27/21 17:27 Ordered


 


 EKG 12 Lead [EK] Routine Ther  09/28/21 07:32 Ordered








                                Medication Orders





Carvedilol (Carvedilol 6.25 Mg Tab)  6.25 mg PO BID ANMOL


   Last Admin: 09/27/21 20:50  Dose: 6.25 mg


   Documented by: AMARI


Hydroxyzine HCl (Hydroxyzine Hcl 25 Mg Tab)  25 mg PO QID PRN


   PRN Reason: Itching


   Last Admin: 09/28/21 02:30  Dose: 25 mg


   Documented by: AMARI


Sodium Chloride (Normal Saline)  250 mls @ 100 mls/hr IV ASDIRECTED [facility]


   Last Admin: 09/27/21 20:29  Dose: 100 mls/hr


   Documented by: AMARI


Sodium Chloride (Normal Saline)  1,000 mls @ 75 mls/hr IV ASDIRECTED [facility]


   Last Infusion: 09/27/21 20:15  Dose: 0 mls/hr


   Documented by: AMARI


   Admin: 09/27/21 18:09  Dose: 75 mls/hr


   Documented by: KENNA


Isosorbide Mononitrate (Isosorbide Mononitrate 30 Mg Tab.Er)  30 mg PO DAILY [facility]


Morphine Sulfate (Morphine 2 Mg/Ml Syringe)  2 mg IVPUSH Q4H PRN


   PRN Reason: Pain


Nitroglycerin (Nitroglycerin 0.4 Mg Tab.Sl)  0.4 mg SL Q5M PRN


   PRN Reason: Chest Pain


Ondansetron HCl (Ondansetron 4 Mg/2 Ml Sdv)  4 mg IV Q4H PRN


   PRN Reason: Nausea/Vomiting


Pantoprazole Sodium (Pantoprazole 40 Mg Vial)  40 mg IVPUSH DAILY [facility]


Potassium Chloride (Potassium Chloride 20 Meq Tab.Er)  20 meq PO DAILY [facility]


Senna/Docusate Sodium (Docusate Sodium/Sennosides 50-8.6 Mg Tab)  1 tab PO BID 

PRN


   PRN Reason: Constipation


Sodium Chloride (Sodium Chloride 0.9% 10 Ml Syringe)  10 ml FLUSH ASDIRECTED PRN


   PRN Reason: Keep Vein Open


Zolpidem Tartrate (Zolpidem 5 Mg Tab)  5 mg PO BEDTIME PRN


   PRN Reason: Sleep


   Last Admin: 09/27/21 20:50  Dose: 5 mg


   Documented by: AMARI








Assessment/Plan Comment:: 


1.  Admit patient to inpatient service.  Appreciate Dr. Mohs consult.  Patient 

will go to EGD this morning.  Further recommendations based on findings.  

Continue to hold antiplatelet and anticoagulation.


2.  Anemia: Patient was given 2 units of packed red blood cells and hemoglobin 

improved with improvement in patient's physical function


3.  Elevated troponin: Likely demand ischemia secondary to anemia and acute 

kidney injury.  Troponin initially trended down but went up overnight.  Continue

 to trend troponin.  EKG shows no obvious ST-T segment abnormalities but the 

patient does have a ventricular pacemaker making full delineation difficult


4.  Acute kidney injury: Creatinine improved slightly overnight, continue to 

trend and function and monitor electrolytes


5.  Hypokalemia: Replete potassium, continue to monitor electrolytes


6.  DVT reflexes: Compression stockings, SCDs, ambulation, restart Eliquis in 

accordance with recommendations from general surgery


7.  GI prophylaxis: Protonix


8.  Position: Pending improvement and test findings including troponin, echo, 

general surgery consultation

## 2021-09-28 NOTE — PCM.EKG
** #1 Interpretation


EKG Date: 09/28/21


Time: 07:49


EKG Interpretation Comments: 


Ventricular paced rhythm, rate 62

## 2021-09-28 NOTE — CONS
DATE OF CONSULTATION:  09/28/2021

 

HISTORY:  This 87-year-old gentleman is seen at the request of Dr. Hernandez for

evaluation of anemia.  He presented to the emergency room last night with a

hemoglobin drop of approximately 2 in the last couple months.  The patient

denies any bleeding.  He did have a history of this in May and was hospitalized

in [place] where an upper endoscopy was performed and showed some telangiectasia

in the stomach, which were lasered.  I did his colonoscopy in November of 2018

and no significant findings were noted.  Hemoccult stool test is negative.  The

patient denies any evidence of recent blood including red stool or dark stool.

He has had no nausea, vomiting, or abdominal pain.  He is on Eliquis and aspirin

and does have a ventricular pacemaker.  Earlier this year when he had his GI

bleed, he was on Coumadin which was switched to Eliquis.

 

PHYSICAL EXAMINATION:  GENERAL:  On exam today, he is awake, alert, and in no

distress.  SKIN:  Pink and warm.  He has received 2 units of blood.  ABDOMEN:

Completely soft and nontender.  No masses or hernias are palpable.

 

ASSESSMENT:  Anemia.

 

PLAN:  Findings were reviewed with Dr. Redd and there was no evidence of

active bleeding.  His anemia has likely slowly occurred over the last couple of

months.  Given his upper endoscopy findings earlier this year, I do not feel it

is necessary to re-endoscope him unless he has evidence of recurrent bleeding.

 

Job#: [id number]

DD: 09/28/2021 0847

DT: 09/28/2021 1013 RYAN/MILIND

## 2021-09-29 VITALS — DIASTOLIC BLOOD PRESSURE: 56 MMHG | SYSTOLIC BLOOD PRESSURE: 116 MMHG | HEART RATE: 67 BPM

## 2021-09-29 NOTE — [id number]
**Discharge Summary





- Hospital Course


Free Text/Narrative:: 


Patient was admitted and given 2 units of packed red blood cells. His troponins 

were trended. Troponins are somewhat chronically elevated. His troponins 

initially trended down slightly and then up slightly but are generally steady at

around 110. I spoke with cardiology on the phone and they informed me that this 

patient has a scheduled watchman procedure for early next month, approximately 2

weeks from now. Cardiology informed me that we can hold anticoagulation at this 

time but that he will need to be anticoagulated after the watchman procedure for

approximately 4 to 6 weeks.





General surgery saw the patient and reviewed prior medical records. Patient has 

a history of angioectasis treated with laser therapy. We do not have those 

facilities here. Patient's hemoglobin has been steady. It is likely that the 

patient has difficulties with intermittent bleeds and that is why 

anticoagulation will be held at this time.





Diagnosis: Stroke: No





- Discharge Data


Discharge Date: 09/29/21


Discharge Disposition: Home, Self-Care 01


Condition: Good





- Referral to Home Health


Primary Care Physician: 


Duane Strand, MD








- Discharge Diagnosis/Problem(s)


(1) Acute kidney injury superimposed on CKD


SNOMED Code(s): 03361809


   ICD Code: N17.9 - ACUTE KIDNEY FAILURE, UNSPECIFIED; N18.9 - CHRONIC KIDNEY 

DISEASE, UNSPECIFIED   Status: Acute   Current Visit: Yes   





(2) Anemia due to GI blood loss


SNOMED Code(s): 930367298, 504727586


   ICD Code: D50.0 - IRON DEFICIENCY ANEMIA SECONDARY TO BLOOD LOSS (CHRONIC)   

Status: Acute   Current Visit: Yes   





(3) Elevated troponin


SNOMED Code(s): 081664459, 282226476, 879582416


   ICD Code: R77.8 - OTHER SPECIFIED ABNORMALITIES OF PLASMA PROTEINS   Status: 

Acute   Current Visit: Yes   





(4) CHF (congestive heart failure)


SNOMED Code(s): 99752981


   ICD Code: I50.9 - HEART FAILURE, UNSPECIFIED   Status: Chronic   Current 

Visit: Yes   


Qualifiers: 


   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 





(5) Peripheral edema


SNOMED Code(s): 571870900


   ICD Code: R60.9 - EDEMA, UNSPECIFIED   Status: Acute   Current Visit: No   





(6) Cardiac pacemaker in situ


SNOMED Code(s): 085227866


   ICD Code: Z95.0 - PRESENCE OF CARDIAC PACEMAKER   Status: Chronic   Current 

Visit: No   





(7) HTN (hypertension)


SNOMED Code(s): 30166827


   ICD Code: I10 - ESSENTIAL (PRIMARY) HYPERTENSION   Status: Chronic   Current 

Visit: No   


Qualifiers: 


   Hypertension type: essential hypertension   Qualified Code(s): I10 - 

Essential (primary) hypertension   





(8) Hypokalemia


SNOMED Code(s): 79903487


   ICD Code: E87.6 - HYPOKALEMIA   Status: Acute   Current Visit: Yes   





- Patient Instructions


Diet: Usual Diet as Tolerated


Driving: May Drive Today


Showering/Bathing: May Shower





- Discharge Plan


*PRESCRIPTION DRUG MONITORING PROGRAM REVIEWED*: Not Applicable


*COPY OF PRESCRIPTION DRUG MONITORING REPORT IN PATIENT BRENDAN: Not Applicable


Home Medications: 


                                    Home Meds





Nitroglycerin [Nitrostat] 0.4 mg SL Q5M PRN 11/27/18 [History]


Isosorbide Mononitrate [Imdur] 30 mg PO DAILY 06/21/21 [History]


Pantoprazole Sodium [Protonix] 40 mg PO BID 06/21/21 [History]


carvediloL [Coreg] 6.25 mg PO BID 06/21/21 [History]


Acetaminophen [Acetaminophen Extra Strength] 1,000 mg PO Q6HR PRN 09/28/21 

[History]


Amoxicillin 500 mg PO ASDIRECTED PRN 09/28/21 [History]


Bumetanide 3 mg PO DAILY 09/28/21 [History]


Cyanocobalamin (Vitamin B-12) [B-12] 1,000 mcg PO DAILY 09/28/21 [History]


Diclofenac Sodium [Voltaren 1% Gel] 2 gm TOP QID PRN 09/28/21 [History]


Melatonin 3 mg PO BEDTIME 09/28/21 [History]


Potassium Chloride 30 meq PO DAILY 09/28/21 [History]


Ubidecarenone [Coenzyme Q10] 100 mg PO DAILY 09/28/21 [History]


metOLazone [Metolazone] 2.5 mg PO WEEKLY PRN 09/28/21 [History]


traZODone 25 mg PO BEDTIME PRN 09/28/21 [History]








Patient Handouts:  Blood Transfusion, Adult, Easy-to-Read, Anemia, Fall 

Prevention in Hospitals, Adult, Venous Thromboembolism Prevention


Forms:  ED Department Discharge


Referrals: 


Strand,Duane, MD [Primary Care Provider] - 





- Discharge Summary/Plan Comment


DC Time >30 min.: No


Total # of Minutes for Discharge Time: 


25





Discharge Summary/Plan Comment: 


Patient advised to follow-up with his primary care physician as soon as 

practical. Patient advised to call cardiology and ensure that he does not miss 

his scheduled watchman procedure. Patient advised to hold anticoagulation until 

he receives further instructions from his primary care physician and cardiology.








- General Info


Date of Service: 09/29/21


Admission Dx/Problem (Free Text: 


                           Admission Diagnosis/Problem





Admission Diagnosis/Problem      Anemia








Functional Status: Reports: Pain Controlled, Tolerating Diet, Ambulating, 

Urinating





- Review of Systems


General: Reports: No Symptoms


HEENT: Reports: No Symptoms


Pulmonary: Reports: No Symptoms


Cardiovascular: Reports: No Symptoms


Gastrointestinal: Reports: No Symptoms


Genitourinary: Reports: No Symptoms


Musculoskeletal: Reports: No Symptoms


Skin: Reports: No Symptoms


Neurological: Reports: No Symptoms


Psychiatric: Reports: No Symptoms





- Patient Data


Vitals - Most Recent: 


                                Last Vital Signs











Temp  36.8 C   09/29/21 04:00


 


Pulse  80   09/29/21 04:00


 


Resp  16   09/29/21 04:00


 


BP  110/64   09/29/21 04:00


 


Pulse Ox  98   09/29/21 04:00











Weight - Most Recent: 70.171 kg


I&O - Last 24 hours: 


                                 Intake & Output











 09/28/21 09/29/21 09/29/21





 22:59 06:59 14:59


 


Intake Total 300  


 


Output Total 850  


 


Balance -550  











Lab Results - Last 24 hrs: 


                         Laboratory Results - last 24 hr











  09/28/21 09/28/21 09/29/21 Range/Units





  11:10 15:03 06:15 


 


WBC    5.1  (3.2-10.1)  x10-3/uL


 


RBC    3.51 L  (3.90-5.90)  x10(6)uL


 


Hgb    10.2 L  (12.9-17.7)  g/dL


 


Hct    31.8 L  (38.3-50.1)  %


 


MCV    90.8  (80.8-98.7)  fL


 


MCH    29.0  (27.0-33.3)  pg


 


MCHC    31.9  (28.7-35.3)  g/dL


 


RDW    17.8 H  (12.4-15.0)  %


 


Plt Count    145  (117-477)  x10(3)uL


 


MPV    9.2  (6.7-11.0)  fL


 


Neut % (Auto)    68.7  (40.3-71.8)  %


 


Lymph % (Auto)    14.0 L  (15.8-45.3)  %


 


Mono % (Auto)    14.7  (5.5-15.2)  %


 


Eos % (Auto)    2.2  (0.1-6.8)  %


 


Baso % (Auto)    0.4  (0.3-3.8)  %


 


Neut # (Auto)    3.5  (1.7-6.9)  x10-3/uL


 


Lymph # (Auto)    0.7  (0.5-4.5)  x10-3/uL


 


Mono # (Auto)    0.8  (0.0-1.2)  x10-3/uL


 


Eos # (Auto)    0.1  (0.0-0.6)  x10-3/uL


 


Baso # (Auto)    0.0  (0.0-0.3)  x10-3/uL


 


Sodium     (135-145)  mmol/L


 


Potassium     (3.5-5.3)  mmol/L


 


Chloride     (100-110)  mmol/L


 


Carbon Dioxide     (21-32)  mmol/L


 


BUN     (7-18)  mg/dL


 


Creatinine     (0.70-1.30)  mg/dL


 


Est Cr Clr Drug Dosing     mL/min


 


Estimated GFR (MDRD)     (>60)  


 


BUN/Creatinine Ratio     (9-20)  


 


Glucose     ()  mg/dL


 


Calcium     (8.6-10.2)  mg/dL


 


Troponin I  107.4 H*  108.3 H*   (4.0-60.3)  pg/mL


 


NT-Pro-B Natriuret Pep     (<=450)  pg/mL














  09/29/21 09/29/21 Range/Units





  06:15 06:15 


 


WBC    (3.2-10.1)  x10-3/uL


 


RBC    (3.90-5.90)  x10(6)uL


 


Hgb    (12.9-17.7)  g/dL


 


Hct    (38.3-50.1)  %


 


MCV    (80.8-98.7)  fL


 


MCH    (27.0-33.3)  pg


 


MCHC    (28.7-35.3)  g/dL


 


RDW    (12.4-15.0)  %


 


Plt Count    (117-477)  x10(3)uL


 


MPV    (6.7-11.0)  fL


 


Neut % (Auto)    (40.3-71.8)  %


 


Lymph % (Auto)    (15.8-45.3)  %


 


Mono % (Auto)    (5.5-15.2)  %


 


Eos % (Auto)    (0.1-6.8)  %


 


Baso % (Auto)    (0.3-3.8)  %


 


Neut # (Auto)    (1.7-6.9)  x10-3/uL


 


Lymph # (Auto)    (0.5-4.5)  x10-3/uL


 


Mono # (Auto)    (0.0-1.2)  x10-3/uL


 


Eos # (Auto)    (0.0-0.6)  x10-3/uL


 


Baso # (Auto)    (0.0-0.3)  x10-3/uL


 


Sodium  142   (135-145)  mmol/L


 


Potassium  3.8   (3.5-5.3)  mmol/L


 


Chloride  106   (100-110)  mmol/L


 


Carbon Dioxide  24   (21-32)  mmol/L


 


BUN  46 H   (7-18)  mg/dL


 


Creatinine  2.0 H*   (0.70-1.30)  mg/dL


 


Est Cr Clr Drug Dosing  24.33   mL/min


 


Estimated GFR (MDRD)  32 L   (>60)  


 


BUN/Creatinine Ratio  23.0 H   (9-20)  


 


Glucose  93   ()  mg/dL


 


Calcium  8.9   (8.6-10.2)  mg/dL


 


Troponin I   113.7 H*  (4.0-60.3)  pg/mL


 


NT-Pro-B Natriuret Pep   81442 H*  (<=450)  pg/mL











Med Orders - Current: 


                               Current Medications





Bumetanide (Bumetanide 1 Mg Tab)  3 mg PO DAILY [facility]


Carvedilol (Carvedilol 6.25 Mg Tab)  6.25 mg PO BID [facility]


   Last Admin: 09/28/21 20:27 Dose:  6.25 mg


   Documented by: 


Diclofenac Sodium (Diclofenac Sodium 1% Gel 100 Gm Tube)  2 gm TOP QID PRN


   PRN Reason: Pain


Hydroxyzine HCl (Hydroxyzine Hcl 25 Mg Tab)  25 mg PO QID PRN


   PRN Reason: Itching


   Last Admin: 09/28/21 02:30 Dose:  25 mg


   Documented by: 


Sodium Chloride (Normal Saline)  250 mls @ 100 mls/hr IV ASDIRECTED [facility]


   Last Admin: 09/27/21 20:29 Dose:  100 mls/hr


   Documented by: 


Isosorbide Mononitrate (Isosorbide Mononitrate 30 Mg Tab.Er)  30 mg PO DAILY [facility]


   Last Admin: 09/28/21 10:24 Dose:  30 mg


   Documented by: 


Metolazone (Metolazone 2.5 Mg Tab)  2.5 mg PO ASDIRECTED PRN


   PRN Reason: Edema


Morphine Sulfate (Morphine 2 Mg/Ml Syringe)  2 mg IVPUSH Q4H PRN


   PRN Reason: Pain


Nitroglycerin (Nitroglycerin 0.4 Mg Tab.Sl)  0.4 mg SL Q5M PRN


   PRN Reason: Chest Pain


Ondansetron HCl (Ondansetron 4 Mg/2 Ml Sdv)  4 mg IV Q4H PRN


   PRN Reason: Nausea/Vomiting


Pantoprazole Sodium (Pantoprazole 40 Mg Tab.Cr)  40 mg PO BID@0730,2100 [facility]


   Last Admin: 09/29/21 07:21 Dose:  40 mg


   Documented by: 


Potassium Chloride (Potassium Chloride 10 Meq Tab.Er)  30 meq PO DAILY [facility]


Senna/Docusate Sodium (Docusate Sodium/Sennosides 50-8.6 Mg Tab)  1 tab PO BID 

PRN


   PRN Reason: Constipation


Sodium Chloride (Sodium Chloride 0.9% 10 Ml Syringe)  10 ml FLUSH ASDIRECTED PRN


   PRN Reason: Keep Vein Open


   Last Admin: 09/28/21 14:04 Dose:  10 ml


   Documented by: 


Trazodone HCl (Trazodone 50 Mg Tab)  25 mg PO BEDTIME PRN


   PRN Reason: Insomnia


Zolpidem Tartrate (Zolpidem 5 Mg Tab)  5 mg PO BEDTIME PRN


   PRN Reason: Sleep


   Last Admin: 09/28/21 20:27 Dose:  5 mg


   Documented by: 





Discontinued Medications





Furosemide (Furosemide 40 Mg/4 Ml Vial)  40 mg IVPUSH NOW ONE


   Stop: 09/28/21 13:13


   Last Admin: 09/28/21 14:01 Dose:  40 mg


   Documented by: 


Sodium Chloride (Normal Saline)  1,000 mls @ 75 mls/hr IV ASDIRECTED [facility]


   Last Infusion: 09/27/21 20:15 Dose:  0 mls/hr


   Documented by: 


Pantoprazole Sodium (Pantoprazole 40 Mg Vial)  80 mg IVPUSH ONETIME ONE


   Stop: 09/27/21 18:05


   Last Admin: 09/27/21 18:17 Dose:  80 mg


   Documented by: 


Pantoprazole Sodium (Pantoprazole 40 Mg Vial)  40 mg IVPUSH DAILY [facility]


   Last Admin: 09/28/21 08:26 Dose:  40 mg


   Documented by: 


Perflutren Lipid Microsphere (Perflutren Lipid Microspheres 2.2 Mg/2 Ml Sdv)  

2.2 mg IVPUSH PREPRO ONE


   Stop: 09/28/21 14:54


   Last Admin: 09/28/21 16:13 Dose:  2.2 mg


   Documented by: 


Potassium Chloride (Potassium Chloride 20 Meq Tab.Er)  20 meq PO DAILY [facility]


   Last Admin: 09/28/21 10:25 Dose:  20 meq


   Documented by: 


Potassium Chloride (Potassium Chloride 20 Meq Tab.Er)  40 meq PO ONETIME ONE


   Stop: 09/28/21 17:55


   Last Admin: 09/28/21 18:27 Dose:  40 meq


   Documented by: 


Rosuvastatin Calcium (Rosuvastatin 20 Mg Tab)  20 mg PO BEDTIME [facility]


   Last Admin: 09/27/21 23:12 Dose:  Not Given


   Documented by: 








Comments:: 


Patient was able to get out of bed and stand up on his own and stand throughout 

the interview and physical exam.








- Exam


Quality Assessment: Reports: Supplemental Oxygen, DVT Prophylaxis


General: Reports: Alert, Oriented, Cooperative, No Acute Distress


HEENT: Reports: EOMI


Neck: Reports: Supple


Lungs: Reports: Crackles, Other (Mild bilateral lower lobe crackles)


Cardiovascular: Reports: Regular Rate, Regular Rhythm, Murmurs


GI/Abdominal Exam: Normal Bowel Sounds, Tender, Other (Chronic abdominal 

tenderness secondary to hernia)


Back Exam: Reports: Normal Inspection.  Denies: CVA Tenderness (R), CVA 

Tenderness (L)


Extremities: Other (Trace bilateral lower lobe edema)


Skin: Reports: Warm, Dry


Neurological: Reports: No New Focal Deficit


Psy/Mental Status: Reports: Alert, Normal Affect, Normal Mood

## 2021-10-27 ENCOUNTER — HOSPITAL ENCOUNTER (EMERGENCY)
Dept: HOSPITAL 7 - FB.ED | Age: 86
Discharge: HOME | End: 2021-10-27
Payer: MEDICARE

## 2021-10-27 VITALS — SYSTOLIC BLOOD PRESSURE: 148 MMHG | HEART RATE: 63 BPM | DIASTOLIC BLOOD PRESSURE: 79 MMHG

## 2021-10-27 DIAGNOSIS — Z88.8: ICD-10-CM

## 2021-10-27 DIAGNOSIS — I25.2: ICD-10-CM

## 2021-10-27 DIAGNOSIS — S60.512A: ICD-10-CM

## 2021-10-27 DIAGNOSIS — I11.0: ICD-10-CM

## 2021-10-27 DIAGNOSIS — E87.6: ICD-10-CM

## 2021-10-27 DIAGNOSIS — E86.0: ICD-10-CM

## 2021-10-27 DIAGNOSIS — S80.212A: Primary | ICD-10-CM

## 2021-10-27 DIAGNOSIS — R53.1: ICD-10-CM

## 2021-10-27 DIAGNOSIS — J44.9: ICD-10-CM
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
## 2021-10-27 DIAGNOSIS — I50.9: ICD-10-CM

## 2021-10-27 DIAGNOSIS — Z79.899: ICD-10-CM
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
## 2021-10-27 DIAGNOSIS — Z88.0: ICD-10-CM

## 2021-10-27 DIAGNOSIS — Z88.1: ICD-10-CM

## 2021-10-27 DIAGNOSIS — I25.10: ICD-10-CM

## 2021-10-27 PROCEDURE — 81001 URINALYSIS AUTO W/SCOPE: CPT

## 2021-10-27 PROCEDURE — 96365 THER/PROPH/DIAG IV INF INIT: CPT

## 2021-10-27 PROCEDURE — 96366 THER/PROPH/DIAG IV INF ADDON: CPT

## 2021-10-27 PROCEDURE — 83735 ASSAY OF MAGNESIUM: CPT

## 2021-10-27 PROCEDURE — 84132 ASSAY OF SERUM POTASSIUM: CPT

## 2021-10-27 PROCEDURE — 80048 BASIC METABOLIC PNL TOTAL CA: CPT

## 2021-10-27 PROCEDURE — 99284 EMERGENCY DEPT VISIT MOD MDM: CPT

## 2021-10-27 PROCEDURE — 85027 COMPLETE CBC AUTOMATED: CPT

## 2021-10-27 PROCEDURE — 36415 COLL VENOUS BLD VENIPUNCTURE: CPT

## 2021-10-27 NOTE — EDM.PDOC
ED HPI GENERAL MEDICAL PROBLEM





- General


Chief Complaint: General


Stated Complaint: weakness


Time Seen by Provider: 10/27/21 14:05


Source of Information: Reports: Patient, Old Records, RN


History Limitations: Reports: No Limitations





- History of Present Illness


INITIAL COMMENTS - FREE TEXT/NARRATIVE: 





86 yo male here after spending the night on the floor of his kitchen after he 

got up to get a drink of water and fell. He was too weak to get up after that. 

He was able to sleep a bit on the floor. He suffered only minor injuries in the 

fall. Says he's been getting weaker for the past few days. No other complaints 

now except dry mouth. 


Onset: Gradual (onset of weakness)


Duration: Day(s):, Getting Worse


Location: Reports: Generalized


Quality: Reports: Other (pain is not a main complaint currently)


Severity: Mild


Improves with: Reports: None


Worsens with: Reports: None


Context: Reports: Other (See HPI)


Associated Symptoms: Reports: Weakness, Other (fall due to weakness)


Treatments PTA: Reports: Other (see below) (none)





- Related Data


                                    Allergies











Allergy/AdvReac Type Severity Reaction Status Date / Time


 


simvastatin [From Zocor] Allergy Unknown Other Verified 09/28/21 01:43


 


amoxicillin [From Augmentin] Allergy  Abdominal Verified 09/28/21 01:43





   Pain  


 


clavulanic acid Allergy  Abdominal Verified 09/28/21 01:43





[From Augmentin]   Pain  


 


sucralfate [From Carafate] Allergy  Itching Verified 09/28/21 01:43











Home Meds: 


                                    Home Meds





Nitroglycerin [Nitrostat] 0.4 mg SL Q5M PRN 11/27/18 [History]


Isosorbide Mononitrate [Imdur] 30 mg PO DAILY 06/21/21 [History]


Pantoprazole Sodium [Protonix] 40 mg PO BID 06/21/21 [History]


carvediloL [Coreg] 6.25 mg PO BID 06/21/21 [History]


Acetaminophen [Acetaminophen Extra Strength] 1,000 mg PO Q6HR PRN 09/28/21 

[History]


Amoxicillin 500 mg PO ASDIRECTED PRN 09/28/21 [History]


Bumetanide 3 mg PO DAILY 09/28/21 [History]


Cyanocobalamin (Vitamin B-12) [B-12] 1,000 mcg PO DAILY 09/28/21 [History]


Diclofenac Sodium [Voltaren 1% Gel] 2 gm TOP QID PRN 09/28/21 [History]


Melatonin 3 mg PO BEDTIME 09/28/21 [History]


Potassium Chloride 30 meq PO DAILY 09/28/21 [History]


Ubidecarenone [Coenzyme Q10] 100 mg PO DAILY 09/28/21 [History]


metOLazone [Metolazone] 2.5 mg PO WEEKLY PRN 09/28/21 [History]


traZODone 25 mg PO BEDTIME PRN 09/28/21 [History]











Past Medical History





- Past Health History


Medical/Surgical History: Denies Medical/Surgical History


HEENT History: Reports: Impaired Vision


Other HEENT History: wears glasses


Cardiovascular History: Reports: Blood Clots/VTE/DVT, Bypass, CAD, 

Cardiomyopathy, Heart Failure, High Cholesterol, Hypertension, MI, Pacemaker, 

Other (See Below)


Other Cardiovascular History: DVT, PFO


Respiratory History: Reports: COPD, SOB


Gastrointestinal History: Reports: None, Other (See Below)


Other Gastrointestinal History: EGD recent, ulceration, on Carafate, anti-

coagulation stopped at that time


Genitourinary History: Reports: Renal Disease, Other (See Below)


Other Genitourinary History: renal infarct


OB/GYN History: Reports: None


Musculoskeletal History: Reports: Arthritis, Other (See Below)


Other Musculoskeletal History: sacroiliitis


Neurological History: Reports: None


Psychiatric History: Reports: None


Endocrine/Metabolic History: Reports: None


Hematologic History: Reports: None


Immunologic History: Reports: None


Oncologic (Cancer) History: Reports: None


Dermatologic History: Reports: None





- Infectious Disease History


Infectious Disease History: Reports: C-Difficile, Measles, Shingles





- Past Surgical History


Head Surgeries/Procedures: Reports: None


HEENT Surgical History: Reports: Adenoidectomy, Cataract Surgery, Tonsillectomy


Cardiovascular Surgical History: Reports: Coronary Artery Bypass, Coronary 

Artery Stent


Other Cardiovascular Surgeries/Procedures: 1 stent, triple bypass


Respiratory Surgical History: Reports: None


GI Surgical History: Reports: None, Appendectomy, Colonoscopy, EGD, Hernia, 

Inguinal


Male  Surgical History: Reports: Other (See Below)


Other Male  Surgeries/Procedures: renal angiogram


Musculoskeletal Surgical History: Reports: Arthroscopic Knee, Hip Replacement, 

Other (See Below)


Other Musculoskeletal Surgeries/Procedures:: bilat hip replacement





Social & Family History





- Family History


Family Medical History: No Pertinent Family History


GI: Reports: None





- Caffeine Use


Caffeine Use: Reports: Coffee


Other Caffeine Use: 1-2 cups coffee/day


Caffeine Use Comment: Daily





- Living Situation & Occupation


Living situation: Reports: 


Occupation: Retired





ED ROS GENERAL





- Review of Systems


Review Of Systems: See Below


Constitutional: Reports: Weakness


HEENT: Reports: Other (dry mouth)


Respiratory: Reports: No Symptoms


Cardiovascular: Reports: No Symptoms


Endocrine: Reports: No Symptoms


GI/Abdominal: Reports: Other (has a hernia)


: Reports: No Symptoms


Musculoskeletal: Reports: No Symptoms


Skin: Reports: Wound (bruises LUE)


Neurological: Reports: No Symptoms





ED EXAM, GENERAL





- Physical Exam


Exam: See Below


Exam Limited By: No Limitations


General Appearance: Alert, WD/WN, No Apparent Distress


Eye Exam: Bilateral Eye: Normal Inspection


Ears: Normal External Exam, Normal Canal, Hearing Grossly Normal


Ear Exam: Bilateral Ear: Auricle Normal, Canal Normal


Nose: Normal Inspection, No Blood


Throat/Mouth: Normal Inspection, Normal Lips, Normal Voice, No Airway 

Compromise, Other (dry mucosa)


Head: Atraumatic, Normocephalic


Neck: Normal Inspection


Respiratory/Chest: No Respiratory Distress, Lungs Clear, Normal Breath Sounds, 

No Accessory Muscle Use


Cardiovascular: Regular Rate, Rhythm, No Edema


GI/Abdominal: Soft, Non-Tender, Hernia


Back Exam: Normal Inspection


Extremities: Normal Inspection, Normal Range of Motion, Non-Tender, No Pedal 

Edema


Neurological: Alert, Oriented, CN II-XII Intact, Normal Cognition, No 

Motor/Sensory Deficits


Psychiatric: Normal Affect, Normal Mood


Skin Exam: Warm, Dry, Normal Color, No Rash, Wound/Incision (abrasions L knee 

and L dorsum of hand)





Course





- Vital Signs


Text/Narrative:: 





dressings placed by EMT's were changed here before discharge by nursing. 


Last Recorded V/S: 


                                Last Vital Signs











Temp  36.4 C   10/27/21 13:25


 


Pulse  66   10/27/21 13:25


 


Resp  16   10/27/21 13:25


 


BP  109/81   10/27/21 13:25


 


Pulse Ox  96   10/27/21 13:25














- Orders/Labs/Meds


Orders: 


                               Active Orders 24 hr











 Category Date Time Status


 


 Bacitracin [Bacitracin Oint 1 GM] Med  10/27/21 18:34 Once





 2 dose TOP ONETIME ONE   


 


 NS + KCl 20mEq/L [Normal Saline with 20 mEq KCl] 1,000 Med  10/27/21 14:15 

Active





 ml   





 IV ASDIRECTED   


 


 Sodium Chloride 0.9% [Saline Flush] Med  10/27/21 14:07 Active





 10 ml FLUSH ASDIRECTED PRN   


 


 Saline Lock Insert [OM.PC] Routine Oth  10/27/21 14:07 Ordered








                                Medication Orders





Potassium Chloride/Sodium Chloride (Normal Saline With 20 Meq Kcl)  1,000 mls @ 

500 mls/hr IV ASDIRECTED ANMOL


   Last Admin: 10/27/21 14:52  Dose: 500 mls/hr


   Documented by: [email or web address]


Sodium Chloride (Sodium Chloride 0.9% 10 Ml Syringe)  10 ml FLUSH ASDIRECTED PRN


   PRN Reason: Keep Vein Open








Labs: 


                                Laboratory Tests











  10/27/21 10/27/21 10/27/21 Range/Units





  13:40 13:40 13:40 


 


WBC  6.6    (3.2-10.1)  x10-3/uL


 


RBC  3.63 L    (3.90-5.90)  x10(6)uL


 


Hgb  10.6 L    (12.9-17.7)  g/dL


 


Hct  32.4 L    (38.3-50.1)  %


 


MCV  89.2    (80.8-98.7)  fL


 


MCH  29.1    (27.0-33.3)  pg


 


MCHC  32.6    (28.7-35.3)  g/dL


 


RDW  17.7 H    (12.4-15.0)  %


 


Plt Count  193    (117-477)  x10(3)uL


 


Sodium   139   (135-145)  mmol/L


 


Potassium   2.8 L* D   (3.5-5.3)  mmol/L


 


Chloride   100  D   (100-110)  mmol/L


 


Carbon Dioxide   32   (21-32)  mmol/L


 


BUN   44 H   (7-18)  mg/dL


 


Creatinine   1.8 H   (0.70-1.30)  mg/dL


 


Est Cr Clr Drug Dosing   TNP   


 


Estimated GFR (MDRD)   36 L   (>60)  


 


BUN/Creatinine Ratio   24.4 H   (9-20)  


 


Glucose   102   ()  mg/dL


 


Calcium   9.4   (8.6-10.2)  mg/dL


 


Magnesium    2.2  (1.8-2.5)  mg/dL


 


Urine Color     (YELLOW)  


 


Urine Appearance     (CLEAR)  


 


Urine pH     (5.0-6.5)  


 


Ur Specific Gravity     (1.010-1.025)  


 


Urine Protein     (NEGATIVE)  mg/dL


 


Urine Glucose (UA)     (NORMAL)  mg/dL


 


Urine Ketones     (NEGATIVE)  mg/dL


 


Urine Occult Blood     (NEGATIVE)  


 


Urine Nitrite     (NEGATIVE)  


 


Urine Bilirubin     (NEGATIVE)  


 


Urine Urobilinogen     (NEGATIVE)  mg/dL


 


Ur Leukocyte Esterase     (NEGATIVE)  


 


Urine RBC     (0-5)  


 


Urine WBC     (0-5)  


 


Ur Squamous Epith Cells     (NS,R,O)  


 


Urine Bacteria     (NS)  














  10/27/21 10/27/21 Range/Units





  14:55 18:00 


 


WBC    (3.2-10.1)  x10-3/uL


 


RBC    (3.90-5.90)  x10(6)uL


 


Hgb    (12.9-17.7)  g/dL


 


Hct    (38.3-50.1)  %


 


MCV    (80.8-98.7)  fL


 


MCH    (27.0-33.3)  pg


 


MCHC    (28.7-35.3)  g/dL


 


RDW    (12.4-15.0)  %


 


Plt Count    (117-477)  x10(3)uL


 


Sodium    (135-145)  mmol/L


 


Potassium   3.0 L  (3.5-5.3)  mmol/L


 


Chloride    (100-110)  mmol/L


 


Carbon Dioxide    (21-32)  mmol/L


 


BUN    (7-18)  mg/dL


 


Creatinine    (0.70-1.30)  mg/dL


 


Est Cr Clr Drug Dosing    


 


Estimated GFR (MDRD)    (>60)  


 


BUN/Creatinine Ratio    (9-20)  


 


Glucose    ()  mg/dL


 


Calcium    (8.6-10.2)  mg/dL


 


Magnesium    (1.8-2.5)  mg/dL


 


Urine Color  Yellow   (YELLOW)  


 


Urine Appearance  Clear   (CLEAR)  


 


Urine pH  6.0   (5.0-6.5)  


 


Ur Specific Gravity  1.020   (1.010-1.025)  


 


Urine Protein  Negative   (NEGATIVE)  mg/dL


 


Urine Glucose (UA)  Normal   (NORMAL)  mg/dL


 


Urine Ketones  Negative   (NEGATIVE)  mg/dL


 


Urine Occult Blood  Negative   (NEGATIVE)  


 


Urine Nitrite  Negative   (NEGATIVE)  


 


Urine Bilirubin  Negative   (NEGATIVE)  


 


Urine Urobilinogen  Normal   (NEGATIVE)  mg/dL


 


Ur Leukocyte Esterase  Negative   (NEGATIVE)  


 


Urine RBC  0-5   (0-5)  


 


Urine WBC  0-5   (0-5)  


 


Ur Squamous Epith Cells  Rare   (NS,R,O)  


 


Urine Bacteria  Occasional H   (NS)  











Meds: 


Medications











Generic Name Dose Route Start Last Admin





  Trade Name Freq  PRN Reason Stop Dose Admin


 


Potassium Chloride/Sodium Chloride  1,000 mls @ 500 mls/hr  10/27/21 14:15  

10/27/21 14:52





  Normal Saline With 20 Meq Kcl  IV   500 mls/hr





  ASDIRECTED ANMOL   Administration


 


Sodium Chloride  10 ml  10/27/21 14:07 





  Sodium Chloride 0.9% 10 Ml Syringe  FLUSH  





  ASDIRECTED PRN  





  Keep Vein Open  














Discontinued Medications














Generic Name Dose Route Start Last Admin





  Trade Name Freq  PRN Reason Stop Dose Admin


 


Potassium Chloride 20 meq/  100 mls @ 50 mls/hr  10/27/21 14:07  10/27/21 14:53





  Premix  IV  10/27/21 16:06  50 mls/hr





  ONETIME ONE   Administration


 


Potassium Chloride  20 meq  10/27/21 14:41  10/27/21 14:53





  Potassium Chloride 20 Meq Tab.Er  PO  10/27/21 14:42  20 meq





  ONETIME ONE   Administration


 


Potassium Chloride  40 meq  10/27/21 18:20 





  Potassium Chloride 20 Meq Tab.Er  PO  10/27/21 18:21 





  ONETIME ONE  














Departure





- Departure


Time of Disposition: 18:42


Disposition: Home, Self-Care 01


Condition: Fair


Clinical Impression: 


 Hypokalemia, Mild dehydration, Weakness, Fall in elderly patient, Abrasions of 

multiple sites








- Discharge Information


Instructions:  Hypokalemia


Referrals: 


Strand,Duane, MD [Primary Care Provider] - 


Forms:  ED Department Discharge


Additional Instructions: 


Double your potassium until you see your doctor again to 40 meq twice daily. 

Consider support hose to reduce your need for diuretics. Avoid added salt. 

Elevate your legs above your heart as much as possible. 





Clean your wounds twice daily with soap and water. Dry. Apply Bacitracin 

ointment and a new bandage. 





Recheck with your doctor within the week, call for an appt. 





Sepsis Event Note (ED)





- Focused Exam


Vital Signs: 


                                   Vital Signs











  Temp Pulse Resp BP Pulse Ox


 


 10/27/21 13:25  36.4 C  66  16  109/81  96














- My Orders


Last 24 Hours: 


My Active Orders





10/27/21 14:07


Sodium Chloride 0.9% [Saline Flush]   10 ml FLUSH ASDIRECTED PRN 


Saline Lock Insert [OM.PC] Routine 





10/27/21 14:15


NS + KCl 20mEq/L [Normal Saline with 20 mEq KCl] 1,000 ml IV ASDIRECTED 





10/27/21 18:34


Bacitracin [Bacitracin Oint 1 GM]   2 dose TOP ONETIME ONE 














- Assessment/Plan


Last 24 Hours: 


My Active Orders





10/27/21 14:07


Sodium Chloride 0.9% [Saline Flush]   10 ml FLUSH ASDIRECTED PRN 


Saline Lock Insert [OM.PC] Routine 





10/27/21 14:15


NS + KCl 20mEq/L [Normal Saline with 20 mEq KCl] 1,000 ml IV ASDIRECTED 





10/27/21 18:34


Bacitracin [Bacitracin Oint 1 GM]   2 dose TOP ONETIME ONE

## 2021-11-08 ENCOUNTER — HOSPITAL ENCOUNTER (INPATIENT)
Dept: HOSPITAL 7 - FB.ED | Age: 86
LOS: 7 days | Discharge: HOME HEALTH | DRG: 291 | End: 2021-11-15
Attending: FAMILY MEDICINE | Admitting: STUDENT IN AN ORGANIZED HEALTH CARE EDUCATION/TRAINING PROGRAM
Payer: MEDICARE

## 2021-11-08 DIAGNOSIS — D64.9: ICD-10-CM

## 2021-11-08 DIAGNOSIS — I42.9: ICD-10-CM

## 2021-11-08 DIAGNOSIS — Z79.82: ICD-10-CM

## 2021-11-08 DIAGNOSIS — Z51.5: ICD-10-CM

## 2021-11-08 DIAGNOSIS — Z95.5: ICD-10-CM

## 2021-11-08 DIAGNOSIS — I25.10: ICD-10-CM

## 2021-11-08 DIAGNOSIS — J44.9: ICD-10-CM

## 2021-11-08 DIAGNOSIS — I50.40: Primary | ICD-10-CM

## 2021-11-08 DIAGNOSIS — Z95.818: ICD-10-CM

## 2021-11-08 DIAGNOSIS — I48.0: ICD-10-CM

## 2021-11-08 DIAGNOSIS — Z90.49: ICD-10-CM

## 2021-11-08 DIAGNOSIS — Z88.1: ICD-10-CM

## 2021-11-08 DIAGNOSIS — Z86.718: ICD-10-CM

## 2021-11-08 DIAGNOSIS — Z90.89: ICD-10-CM

## 2021-11-08 DIAGNOSIS — E87.1: ICD-10-CM

## 2021-11-08 DIAGNOSIS — Z88.8: ICD-10-CM

## 2021-11-08 DIAGNOSIS — I25.2: ICD-10-CM

## 2021-11-08 DIAGNOSIS — E87.6: ICD-10-CM

## 2021-11-08 DIAGNOSIS — M19.90: ICD-10-CM

## 2021-11-08 DIAGNOSIS — H54.7: ICD-10-CM

## 2021-11-08 DIAGNOSIS — E78.00: ICD-10-CM

## 2021-11-08 DIAGNOSIS — R18.8: ICD-10-CM

## 2021-11-08 DIAGNOSIS — Z95.0: ICD-10-CM

## 2021-11-08 DIAGNOSIS — Z96.643: ICD-10-CM

## 2021-11-08 DIAGNOSIS — I25.5: ICD-10-CM

## 2021-11-08 DIAGNOSIS — N18.32: ICD-10-CM

## 2021-11-08 DIAGNOSIS — Z87.891: ICD-10-CM

## 2021-11-08 DIAGNOSIS — Z79.899: ICD-10-CM

## 2021-11-08 DIAGNOSIS — Z86.19: ICD-10-CM

## 2021-11-08 DIAGNOSIS — N28.9: ICD-10-CM

## 2021-11-08 DIAGNOSIS — Z98.49: ICD-10-CM

## 2021-11-08 DIAGNOSIS — R77.8: ICD-10-CM

## 2021-11-08 DIAGNOSIS — Z20.822: ICD-10-CM

## 2021-11-08 DIAGNOSIS — I11.0: ICD-10-CM

## 2021-11-08 DIAGNOSIS — I50.43: ICD-10-CM

## 2021-11-08 DIAGNOSIS — I13.0: ICD-10-CM

## 2021-11-08 DIAGNOSIS — Z95.1: ICD-10-CM

## 2021-11-08 DIAGNOSIS — E78.5: ICD-10-CM

## 2021-11-08 DIAGNOSIS — I51.3: ICD-10-CM

## 2021-11-08 DIAGNOSIS — Z79.01: ICD-10-CM

## 2021-11-08 DIAGNOSIS — Q21.1: ICD-10-CM

## 2021-11-08 PROCEDURE — 80053 COMPREHEN METABOLIC PANEL: CPT

## 2021-11-08 PROCEDURE — 36415 COLL VENOUS BLD VENIPUNCTURE: CPT

## 2021-11-08 PROCEDURE — 93005 ELECTROCARDIOGRAM TRACING: CPT

## 2021-11-08 PROCEDURE — 99285 EMERGENCY DEPT VISIT HI MDM: CPT

## 2021-11-08 PROCEDURE — 83880 ASSAY OF NATRIURETIC PEPTIDE: CPT

## 2021-11-08 PROCEDURE — 84484 ASSAY OF TROPONIN QUANT: CPT

## 2021-11-08 PROCEDURE — 85610 PROTHROMBIN TIME: CPT

## 2021-11-08 PROCEDURE — 85025 COMPLETE CBC W/AUTO DIFF WBC: CPT

## 2021-11-08 PROCEDURE — U0002 COVID-19 LAB TEST NON-CDC: HCPCS

## 2021-11-08 PROCEDURE — P9016 RBC LEUKOCYTES REDUCED: HCPCS

## 2021-11-08 PROCEDURE — 71045 X-RAY EXAM CHEST 1 VIEW: CPT

## 2021-11-08 RX ADMIN — Medication PRN ML: at 11:07

## 2021-11-08 NOTE — PCM.HP.2
H&P History of Present Illness





- General


Date of Service: 11/08/21


Admit Problem/Dx: 


                           Admission Diagnosis/Problem





Admission Diagnosis/Problem      CHF, Congestive heart failure








Source of Information: Patient, Old Records, Provider





- History of Present Illness


Initial Comments - Free Text/Narative: 


87-year-old gentleman came to the emergency department due to a 2 or more week 

history of increasing shortness of breath/dyspnea on exertion.  His past medical

history is extensive and complicated and involves ischemic cardiomyopathy with 

both systolic and diastolic heart failure.  His last echocardiogram showed EF 

approximately 30% with severe wall motion abnormality.  He has known vegetations

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  Patient also has hypertension, known atrial septal 

defect, paroxysmal atrial fibrillation, history of NSTEMI, chronic kidney 

disease stage IIIb, COPD.  In the emergency department the patient was found to 

have a significantly elevated BNP at greater than 18,000.  Note that BNP has 

been elevated at greater than 19,000 in the past.





Patient is a poor historian but it appears that the patient recently had a 

watchman device placed.  It is possible that the device placed was an atrial 

septal defect closure device.  Patient was restarted on his Coumadin secondary 

to placement of this device with a Lovenox bridge.  However, he was also found 

to have significantly elevated INR at 6.2 in the emergency department.





  ** Back


Pain Score (Numeric/FACES): 5





- Related Data


Allergies/Adverse Reactions: 


                                    Allergies











Allergy/AdvReac Type Severity Reaction Status Date / Time


 


simvastatin [From Zocor] Allergy Unknown Other Verified 09/28/21 01:43


 


amoxicillin [From Augmentin] Allergy  Abdominal Verified 09/28/21 01:43





   Pain  


 


clavulanic acid Allergy  Abdominal Verified 09/28/21 01:43





[From Augmentin]   Pain  


 


sucralfate [From Carafate] Allergy  Itching Verified 09/28/21 01:43











Home Medications: 


                                    Home Meds





Nitroglycerin [Nitrostat] 0.4 mg SL Q5M PRN 11/27/18 [History]


Pantoprazole Sodium [Protonix] 40 mg PO DAILY 06/21/21 [History]


carvediloL [Coreg] 6.25 mg PO BID 06/21/21 [History]


Acetaminophen [Acetaminophen Extra Strength] 1,000 mg PO Q6H PRN 09/28/21 

[History]


Amoxicillin 2,000 mg PO ASDIRECTED PRN 09/28/21 [History]


Bumetanide 3 mg PO DAILY 09/28/21 [History]


Cyanocobalamin (Vitamin B-12) [B-12] 1,000 mcg PO DAILY 09/28/21 [History]


Potassium Chloride 30 meq PO DAILY 09/28/21 [History]


metOLazone [Metolazone] 2.5 mg PO WEEKLY PRN 09/28/21 [History]


traZODone 50 mg PO BEDTIME 09/28/21 [History]


Aspirin [Halfprin] 81 mg PO BEDTIME 11/08/21 [History]


Docusate Sodium/Sennosides [Senna Plus] 2 tab PO DAILY PRN 11/08/21 [History]


Enoxaparin [Lovenox] 80 mg SUBCUT Q12H 11/08/21 [History]


Melatonin 10 mg PO BEDTIME 11/08/21 [History]


Warfarin [Coumadin] 2.5 - 5 mg PO ASDIRECTED 11/08/21 [History]











Past Medical History





- Past Health History


Medical/Surgical History: Denies Medical/Surgical History


HEENT History: Reports: Impaired Vision


Other HEENT History: wears glasses


Cardiovascular History: Reports: Blood Clots/VTE/DVT, Bypass, CAD, Card

iomyopathy, Heart Failure, High Cholesterol, Hypertension, MI, Pacemaker, Other 

(See Below)


Other Cardiovascular History: DVT, PFO


Respiratory History: Reports: COPD, SOB


Gastrointestinal History: Reports: None, Other (See Below)


Other Gastrointestinal History: EGD recent, ulceration, on Carafate, anti-

coagulation stopped at that time


Genitourinary History: Reports: Renal Disease, Other (See Below)


Other Genitourinary History: renal infarct


OB/GYN History: Reports: None


Musculoskeletal History: Reports: Arthritis, Other (See Below)


Other Musculoskeletal History: sacroiliitis


Neurological History: Reports: None


Psychiatric History: Reports: None


Endocrine/Metabolic History: Reports: None


Hematologic History: Reports: None


Immunologic History: Reports: None


Oncologic (Cancer) History: Reports: None


Dermatologic History: Reports: None





- Infectious Disease History


Infectious Disease History: Reports: C-Difficile, Chicken Pox, Measles, Shingles





- Past Surgical History


Head Surgeries/Procedures: Reports: None


HEENT Surgical History: Reports: Adenoidectomy, Cataract Surgery, Tonsillectomy


Cardiovascular Surgical History: Reports: Coronary Artery Bypass, Coronary 

Artery Stent, Other (See Below)


Other Cardiovascular Surgeries/Procedures: 1 stent, triple bypass watchman 

procedure 11/21


Respiratory Surgical History: Reports: None


GI Surgical History: Reports: None, Appendectomy, Colonoscopy, EGD, Hernia, 

Inguinal


Male  Surgical History: Reports: Other (See Below)


Other Male  Surgeries/Procedures: renal angiogram


Musculoskeletal Surgical History: Reports: Arthroscopic Knee, Hip Replacement, 

Other (See Below)


Other Musculoskeletal Surgeries/Procedures:: bilat hip replacement





Social & Family History





- Family History


Family Medical History: No Pertinent Family History


GI: Reports: None





- Tobacco Use


Tobacco Use Status *Q: Former Tobacco User


Used Tobacco, but Quit: Yes


Month/Year Tobacco Last Used: 1989





- Caffeine Use


Caffeine Use: Reports: None


Other Caffeine Use: 1-2 cups coffee/day


Caffeine Use Comment: Daily





- Alcohol Use


Days Per Week of Alcohol Use: 4


Number of Drinks Per Day: 1


Total Drinks Per Week: 4





- Recreational Drug Use


Recreational Drug Use: Yes


Recreational Drug Type: Reports: Other (see below)


Other Recreational Drug Type: medical marijuana


Recreational Drug Use Frequency: Daily





- Living Situation & Occupation


Living situation: Reports: 


Occupation: Retired





H&P Review of Systems





- Review of Systems:


Review Of Systems: See Below


General: Reports: Weakness


HEENT: Reports: No Symptoms


Pulmonary: Reports: Shortness of Breath


Cardiovascular: Reports: No Symptoms


Gastrointestinal: Reports: Abdominal Pain


Genitourinary: Reports: No Symptoms


Musculoskeletal: Reports: No Symptoms


Skin: Reports: Wound, Other (Skin tears)


Psychiatric: Reports: No Symptoms


Neurological: Reports: Difficulty Walking, Weakness


Hematologic/Lymphatic: Reports: Anemia, Easy Bruising


Immunologic: Reports: No Symptoms





Exam





- Exam


Exam: See Below





- Vital Signs


Vital Signs: 


                                Last Vital Signs











Temp  36.7 C   11/08/21 13:30


 


Pulse  74   11/08/21 13:30


 


Resp  18   11/08/21 13:30


 


BP  166/68 H  11/08/21 13:30


 


Pulse Ox  98   11/08/21 13:30











Weight: 73.936 kg





- Exam


Quality Assessment: Supplemental Oxygen


General: Alert, Oriented, Cooperative


HEENT: EOMI


Neck: Supple


Lungs: Decreased Breath Sounds, Crackles


Cardiovascular: Irregular Rhythm, Systolic Murmur, Gallop/S3


GI/Abdominal Exam: Normal Bowel Sounds, Distended, Tender, Other (Patient has 

palpable, likely hernias ventral abdomen with tenderness to palpation)


Back Exam: Normal Inspection.  No: CVA Tenderness (R), CVA Tenderness (L)


Extremities: Pedal Edema


Peripheral Pulses: 2+: Radial (L), Radial (R)


Skin: Other (Patient has ecchymosis and skin tears likely secondary to thinning 

of the skin secondary to age and ecchymosis/thinning skin secondary to chronic 

Coumadin use and or enoxaparin)


Neurological: Cranial Nerves Intact


Neuro Extensive - Mental Status: Alert, Oriented x3, Normal Mood/Affect, Normal 

Cognition


Psychiatric: Alert, Normal Affect, Normal Mood





- Patient Data


Lab Results Last 24 hrs: 


                         Laboratory Results - last 24 hr











  11/08/21 11/08/21 11/08/21 Range/Units





  10:49 10:49 10:49 


 


WBC  7.1    (3.2-10.1)  x10-3/uL


 


RBC  3.03 L    (3.90-5.90)  x10(6)uL


 


Hgb  8.4 L    (12.9-17.7)  g/dL


 


Hct  27.1 L    (38.3-50.1)  %


 


MCV  89.3    (80.8-98.7)  fL


 


MCH  27.7    (27.0-33.3)  pg


 


MCHC  31.0    (28.7-35.3)  g/dL


 


RDW  18.1 H    (12.4-15.0)  %


 


Plt Count  220    (117-477)  x10(3)uL


 


MPV  8.5    (6.7-11.0)  fL


 


Neut % (Auto)  75.9 H    (40.3-71.8)  %


 


Lymph % (Auto)  12.0 L    (15.8-45.3)  %


 


Mono % (Auto)  10.9    (5.5-15.2)  %


 


Eos % (Auto)  0.7    (0.1-6.8)  %


 


Baso % (Auto)  0.5    (0.3-3.8)  %


 


Neut # (Auto)  5.4    (1.7-6.9)  x10-3/uL


 


Lymph # (Auto)  0.9    (0.5-4.5)  x10-3/uL


 


Mono # (Auto)  0.8    (0.0-1.2)  x10-3/uL


 


Eos # (Auto)  0.1    (0.0-0.6)  x10-3/uL


 


Baso # (Auto)  0.0    (0.0-0.3)  x10-3/uL


 


PT   59.4 H*   (9.0-11.1)  sec


 


INR   6.20 H*   (1.00-1.24)  


 


Sodium    137  (135-145)  mmol/L


 


Potassium    4.5  D  (3.5-5.3)  mmol/L


 


Chloride    100  (100-110)  mmol/L


 


Carbon Dioxide    27  (21-32)  mmol/L


 


BUN    53 H  (7-18)  mg/dL


 


Creatinine    1.9 H  (0.70-1.30)  mg/dL


 


Est Cr Clr Drug Dosing    27.39  mL/min


 


Estimated GFR (MDRD)    34 L  (>60)  


 


BUN/Creatinine Ratio    27.9 H  (9-20)  


 


Glucose    110  ()  mg/dL


 


Calcium    8.6  (8.6-10.2)  mg/dL


 


Total Bilirubin    1.0  (0.1-1.3)  mg/dL


 


AST    28 H D  (5-25)  IU/L


 


ALT    21  D  (12-36)  U/L


 


Alkaline Phosphatase    139 H  ()  IU/L


 


Troponin I     (4.0-60.3)  pg/mL


 


NT-Pro-B Natriuret Pep     (<=450)  pg/mL


 


Total Protein    6.2  (6.0-8.0)  g/dL


 


Albumin    3.1 L  (3.2-4.6)  g/dL


 


Globulin    3.1  g/dL


 


Albumin/Globulin Ratio    1.0  


 


SARS-CoV-2 RNA (MICHAEL)     (NEGATIVE)  














  11/08/21 11/08/21 Range/Units





  10:50 11:55 


 


WBC    (3.2-10.1)  x10-3/uL


 


RBC    (3.90-5.90)  x10(6)uL


 


Hgb    (12.9-17.7)  g/dL


 


Hct    (38.3-50.1)  %


 


MCV    (80.8-98.7)  fL


 


MCH    (27.0-33.3)  pg


 


MCHC    (28.7-35.3)  g/dL


 


RDW    (12.4-15.0)  %


 


Plt Count    (117-477)  x10(3)uL


 


MPV    (6.7-11.0)  fL


 


Neut % (Auto)    (40.3-71.8)  %


 


Lymph % (Auto)    (15.8-45.3)  %


 


Mono % (Auto)    (5.5-15.2)  %


 


Eos % (Auto)    (0.1-6.8)  %


 


Baso % (Auto)    (0.3-3.8)  %


 


Neut # (Auto)    (1.7-6.9)  x10-3/uL


 


Lymph # (Auto)    (0.5-4.5)  x10-3/uL


 


Mono # (Auto)    (0.0-1.2)  x10-3/uL


 


Eos # (Auto)    (0.0-0.6)  x10-3/uL


 


Baso # (Auto)    (0.0-0.3)  x10-3/uL


 


PT    (9.0-11.1)  sec


 


INR    (1.00-1.24)  


 


Sodium    (135-145)  mmol/L


 


Potassium    (3.5-5.3)  mmol/L


 


Chloride    (100-110)  mmol/L


 


Carbon Dioxide    (21-32)  mmol/L


 


BUN    (7-18)  mg/dL


 


Creatinine    (0.70-1.30)  mg/dL


 


Est Cr Clr Drug Dosing    mL/min


 


Estimated GFR (MDRD)    (>60)  


 


BUN/Creatinine Ratio    (9-20)  


 


Glucose    ()  mg/dL


 


Calcium    (8.6-10.2)  mg/dL


 


Total Bilirubin    (0.1-1.3)  mg/dL


 


AST    (5-25)  IU/L


 


ALT    (12-36)  U/L


 


Alkaline Phosphatase    ()  IU/L


 


Troponin I  107.3 H*   (4.0-60.3)  pg/mL


 


NT-Pro-B Natriuret Pep  23557 H*   (<=450)  pg/mL


 


Total Protein    (6.0-8.0)  g/dL


 


Albumin    (3.2-4.6)  g/dL


 


Globulin    g/dL


 


Albumin/Globulin Ratio    


 


SARS-CoV-2 RNA (MICHAEL)   Negative  (NEGATIVE)  











Result Diagrams: 


                                 11/08/21 10:49





                                 11/08/21 10:49





Sepsis Event Note





- Evaluation


Sepsis Screening Result: No Definite Risk





- Focused Exam


Vital Signs: 


                                   Vital Signs











  Temp Pulse Resp BP Pulse Ox


 


 11/08/21 13:30  36.7 C  74  18  166/68 H  98


 


 11/08/21 10:17  35.8 C L  71  16  124/63  99














- Problem List


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


SNOMED Code(s): 72157955, 034329292


   ICD Code: I50.40 - UNSP COMBINED SYSTOLIC AND DIASTOLIC (CONGESTIVE) HRT FAIL

   Status: Chronic   Current Visit: Yes   





(2) Abdominal ascites


SNOMED Code(s): 277285468


   ICD Code: R18.8 - OTHER ASCITES   Status: Acute   Current Visit: No   


Qualifiers: 


   Ascites type: other type   Qualified Code(s): R18.8 - Other ascites   





(3) Abrasions of multiple sites


SNOMED Code(s): 339225069, 172703802


   ICD Code: T07.XXXA - UNSPECIFIED MULTIPLE INJURIES, INITIAL ENCOUNTER   

Status: Acute   Current Visit: No   





(4) Anemia


SNOMED Code(s): 772104950


   ICD Code: D64.9 - ANEMIA, UNSPECIFIED   Status: Chronic   Current Visit: No  

 





(5) CHF exacerbation


SNOMED Code(s): 726208722, 09607263771451


   ICD Code: I50.9 - HEART FAILURE, UNSPECIFIED   Status: Acute   Current Visit:

 No   


Qualifiers: 


   Heart failure type: unspecified   Qualified Code(s): I50.9 - Heart failure, 

unspecified   





(6) Decompensated heart failure


SNOMED Code(s): 726724881


   ICD Code: I50.9 - HEART FAILURE, UNSPECIFIED   Status: Acute   Current Visit:

 No   





(7) Elevated INR


SNOMED Code(s): 955307861


   ICD Code: R79.1 - ABNORMAL COAGULATION PROFILE   Status: Acute   Current 

Visit: No   





(8) Elevated troponin


SNOMED Code(s): 843548340, 199003397, 899022470


   ICD Code: R77.8 - OTHER SPECIFIED ABNORMALITIES OF PLASMA PROTEINS   Status: 

Acute   Current Visit: No   





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


SNOMED Code(s): 1312081


   ICD Code: I25.2 - OLD MYOCARDIAL INFARCTION   Status: Chronic   Current 

Visit: No   





(10) Peripheral edema


SNOMED Code(s): 239976096


   ICD Code: R60.9 - EDEMA, UNSPECIFIED   Status: Acute   Current Visit: No   





(11) Weakness


SNOMED Code(s): 05564713


   ICD Code: R53.1 - WEAKNESS   Status: Acute   Current Visit: No   





(12) CAD (coronary artery disease)


SNOMED Code(s): 07728606


   ICD Code: I25.10 - ATHSCL HEART DISEASE OF NATIVE CORONARY ARTERY W/O ANG 

PCTRS   Status: Chronic   Current Visit: No   


Qualifiers: 


   Coronary Disease-Associated Artery/Lesion type: bypass graft   Associated 

angina: without angina 





(13) COPD (chronic obstructive pulmonary disease)


SNOMED Code(s): 24273749


   ICD Code: J44.9 - CHRONIC OBSTRUCTIVE PULMONARY DISEASE, UNSPECIFIED   

Status: Chronic   Current Visit: No   


Qualifiers: 


   Chronic bronchitis type: unspecified 





(14) Cardiac pacemaker in situ


SNOMED Code(s): 511460475


   ICD Code: Z95.0 - PRESENCE OF CARDIAC PACEMAKER   Status: Chronic   Current 

Visit: No   





(15) HTN (hypertension)


SNOMED Code(s): 21361753


   ICD Code: I10 - ESSENTIAL (PRIMARY) HYPERTENSION   Status: Chronic   Current 

Visit: No   


Qualifiers: 


   Hypertension type: essential hypertension 





(16) Hyperlipidemia


SNOMED Code(s): 42299661


   ICD Code: E78.5 - HYPERLIPIDEMIA, UNSPECIFIED   Status: Chronic   Current 

Visit: Yes   





(17) Chronic kidney disease, stage 3b


SNOMED Code(s): 417872883


   ICD Code: N18.32 - CHRONIC KIDNEY DISEASE, STAGE 3B   Status: Chronic   

Current Visit: Yes   





(18) Encounter for palliative care


SNOMED Code(s): 897038507, 979311132


   ICD Code: Z51.5 - ENCOUNTER FOR PALLIATIVE CARE   Status: Acute   Current 

Visit: Yes   


Problem List Initiated/Reviewed/Updated: Yes


Orders Last 24hrs: 


                               Active Orders 24 hr











 Category Date Time Status


 


 Patient Status [ADT] Routine ADT  11/08/21 14:04 Active


 


 Antiembolic Devices [RC] .PRN Care  11/08/21 14:08 Active


 


 Pulse Oximetry [RC] PRN Care  11/08/21 14:04 Active


 


 VTE/DVT Education [RC] DAILY Care  11/08/21 14:08 Active


 


 Vital Signs [RC] Q4H Care  11/08/21 14:04 Active


 


 Heart Healthy Diet [DIET] Diet  11/08/21 Dinner Active


 


 Chest 1V Frontal [CR] Stat Exams  11/08/21 10:21 Taken


 


 TROPONIN I [CHEM] Routine Lab  11/09/21 Ordered


 


 Acetaminophen [Tylenol Extra Strength] Med  11/08/21 11:33 Active





 1,000 mg PO Q6H PRN   


 


 Docusate Sodium/Sennosides [Senna Plus] Med  11/08/21 13:56 Active





 2 tab PO DAILY PRN   


 


 Furosemide [Lasix] Med  11/08/21 17:00 Active





 40 mg IVPUSH BIDDIURETIC   


 


 Melatonin Med  11/08/21 21:00 Active





 9 mg PO BEDTIME   


 


 Nitroglycerin [Nitrostat] Med  11/08/21 11:33 Active





 0.4 mg SL Q5M PRN   


 


 Pantoprazole [ProTONIX***] Med  11/08/21 21:00 Active





 40 mg PO BID   


 


 Potassium Chloride [Klor-Con 10] Med  11/09/21 09:00 Active





 30 meq PO DAILY   


 


 Sodium Chloride 0.9% [Saline Flush] Med  11/08/21 10:21 Active





 10 ml FLUSH ASDIRECTED PRN   


 


 carvediloL [Coreg] Med  11/08/21 21:00 Active





 6.25 mg PO BID   


 


 metOLazone [Zaroxolyn] Med  11/09/21 09:00 Active





 5 mg PO DAILY   


 


 traZODone Med  11/08/21 21:00 Active





 50 mg PO BEDTIME   


 


 DVT/VTE Prophylaxis Reflex [OM.PC] Per Unit Routine Oth  11/08/21 14:04 Ordered


 


 Saline Lock Insert [OM.PC] Routine Oth  11/08/21 10:21 Ordered


 


 Resuscitation Status Routine Resus Stat  11/08/21 14:04 Ordered


 


 EKG 12 Lead [EK] Routine Ther  11/08/21 10:21 Stop Req








                                Medication Orders





Acetaminophen (Acetaminophen 500 Mg Tab)  1,000 mg PO Q6H PRN


   PRN Reason: Pain


Carvedilol (Carvedilol 6.25 Mg Tab)  6.25 mg PO BID ANMOL


Furosemide (Furosemide 40 Mg/4 Ml Vial)  40 mg IVPUSH BIDDIURETIC ANMOL


   Last Admin: 11/08/21 17:30  Dose: 40 mg


   Documented by: ЕЛЕНА


Melatonin (Melatonin 3 Mg Tab)  9 mg PO BEDTIME ANMOL


Metolazone (Metolazone 5 Mg Tab)  5 mg PO DAILY ANMOL


Nitroglycerin (Nitroglycerin 0.4 Mg Tab.Sl)  0.4 mg SL Q5M PRN


   PRN Reason: Chest Pain


Pantoprazole Sodium (Pantoprazole 40 Mg Tab.Cr)  40 mg PO BID ANMOL


Potassium Chloride (Potassium Chloride 10 Meq Tab.Er)  30 meq PO DAILY [facility]


Senna/Docusate Sodium (Docusate Sodium/Sennosides 50-8.6 Mg Tab)  2 tab PO DAILY

 PRN


   PRN Reason: Constipation


Sodium Chloride (Sodium Chloride 0.9% 10 Ml Syringe)  10 ml FLUSH ASDIRECTED PRN


   PRN Reason: Keep Vein Open


   Last Admin: 11/08/21 11:07  Dose: 10 ml


   Documented by: MURALI


Trazodone HCl (Trazodone 50 Mg Tab)  50 mg PO BEDTIME [facility]








Assessment/Plan Comment:: 


1.  Elevated INR:  Hold Coumadin at this time.  We will try to coordinate with 

his cardiologist/primary care physician to determine goals of care regarding 

anticoagulation


2.  Acute exacerbation of congestive heart failure: Patient was being treated at

 home with Bumex.  He been gaining weight steadily and having increasing dyspnea

 on exertion over the last several weeks.  Patient given metolazone and IV Lasix

 in the emergency department.  Patient will continue with this treatment and 

daily weights to help return his low weight to baseline


3.  Weakness/dyspnea on exertion: PT/OT, treatments as above


4.  Chronic medical problems: Continue home medications as indicated


5.  DVT prophylaxis: SCDs at night, compression hose during the day.  We will 

restart Coumadin and/or enoxaparin and/or NOAC as indicated in consultation with

 primary care/cardiology


6.  GI prophylaxis: Heart healthy diet, home medicationpantoprazole 40 mg twice

 daily


7.  Disposition: Patient will need significant diuresis and OT/PT evaluation and

 treatment, likely 3 to 4 days

## 2021-11-08 NOTE — EDM.PDOC
ED HPI GENERAL MEDICAL PROBLEM





- General


Chief Complaint: Respiratory Problem


Stated Complaint: SOB


Time Seen by Provider: 11/08/21 10:25


Source of Information: Reports: Patient


History Limitations: Reports: No Limitations





- History of Present Illness


INITIAL COMMENTS - FREE TEXT/NARRATIVE: 





Patient presented to the ED because of increasing dyspnea especially on 

exertion, weakness and weight gain. His dyspnea has been getting worse for the 

past 2 weeks. There is no chest pain, N/V. No fever or chills. He was recently 

admitted at [facility] for a cardiac device placement which  he doesn't 

know. His cardiologist apparently ordered an echo and his EF is worse than his 

previous EF of 30%. 


  ** Back


Pain Score (Numeric/FACES): 3





- Related Data


                                    Allergies











Allergy/AdvReac Type Severity Reaction Status Date / Time


 


simvastatin [From Zocor] Allergy Unknown Other Verified 09/28/21 01:43


 


amoxicillin [From Augmentin] Allergy  Abdominal Verified 09/28/21 01:43





   Pain  


 


clavulanic acid Allergy  Abdominal Verified 09/28/21 01:43





[From Augmentin]   Pain  


 


sucralfate [From Carafate] Allergy  Itching Verified 09/28/21 01:43











Home Meds: 


                                    Home Meds





Nitroglycerin [Nitrostat] 0.4 mg SL Q5M PRN 11/27/18 [History]


Pantoprazole Sodium [Protonix] 40 mg PO DAILY 06/21/21 [History]


carvediloL [Coreg] 6.25 mg PO BID 06/21/21 [History]


Acetaminophen [Acetaminophen Extra Strength] 1,000 mg PO Q6H PRN 09/28/21 

[History]


Amoxicillin 2,000 mg PO ASDIRECTED PRN 09/28/21 [History]


Bumetanide 3 mg PO DAILY 09/28/21 [History]


Cyanocobalamin (Vitamin B-12) [B-12] 1,000 mcg PO DAILY 09/28/21 [History]


Potassium Chloride 30 meq PO DAILY 09/28/21 [History]


metOLazone [Metolazone] 2.5 mg PO WEEKLY PRN 09/28/21 [History]


traZODone 50 mg PO BEDTIME 09/28/21 [History]


Aspirin [Halfprin] 81 mg PO BEDTIME 11/08/21 [History]


Docusate Sodium/Sennosides [Senna Plus] 2 tab PO DAILY PRN 11/08/21 [History]


Enoxaparin [Lovenox] 80 mg SUBCUT Q12H 11/08/21 [History]


Melatonin 10 mg PO BEDTIME 11/08/21 [History]


Warfarin [Coumadin] 2.5 - 5 mg PO ASDIRECTED 11/08/21 [History]











Past Medical History





- Past Health History


Medical/Surgical History: Denies Medical/Surgical History


HEENT History: Reports: Impaired Vision


Other HEENT History: wears glasses


Cardiovascular History: Reports: Blood Clots/VTE/DVT, Bypass, CAD, 

Cardiomyopathy, Heart Failure, High Cholesterol, Hypertension, MI, Pacemaker, 

Other (See Below)


Other Cardiovascular History: DVT, PFO


Respiratory History: Reports: COPD, SOB


Gastrointestinal History: Reports: None, Other (See Below)


Other Gastrointestinal History: EGD recent, ulceration, on Carafate, anti-

coagulation stopped at that time


Genitourinary History: Reports: Renal Disease, Other (See Below)


Other Genitourinary History: renal infarct


OB/GYN History: Reports: None


Musculoskeletal History: Reports: Arthritis, Other (See Below)


Other Musculoskeletal History: sacroiliitis


Neurological History: Reports: None


Psychiatric History: Reports: None


Endocrine/Metabolic History: Reports: None


Hematologic History: Reports: None


Immunologic History: Reports: None


Oncologic (Cancer) History: Reports: None


Dermatologic History: Reports: None





- Infectious Disease History


Infectious Disease History: Reports: C-Difficile, Chicken Pox, Measles, Shingles





- Past Surgical History


Head Surgeries/Procedures: Reports: None


HEENT Surgical History: Reports: Adenoidectomy, Cataract Surgery, Tonsillectomy


Cardiovascular Surgical History: Reports: Coronary Artery Bypass, Coronary 

Artery Stent


Other Cardiovascular Surgeries/Procedures: 1 stent, triple bypass


Respiratory Surgical History: Reports: None


GI Surgical History: Reports: None, Appendectomy, Colonoscopy, EGD, Hernia, 

Inguinal


Male  Surgical History: Reports: Other (See Below)


Other Male  Surgeries/Procedures: renal angiogram


Musculoskeletal Surgical History: Reports: Arthroscopic Knee, Hip Replacement, 

Other (See Below)


Other Musculoskeletal Surgeries/Procedures:: bilat hip replacement





Social & Family History





- Family History


Family Medical History: No Pertinent Family History


GI: Reports: None





- Caffeine Use


Caffeine Use: Reports: Coffee


Other Caffeine Use: 1-2 cups coffee/day


Caffeine Use Comment: Daily





- Living Situation & Occupation


Living situation: Reports: 


Occupation: Retired





ED ROS GENERAL





- Review of Systems


Review Of Systems: See Below


Constitutional: Reports: Malaise, Weakness


HEENT: Reports: No Symptoms


Respiratory: Reports: Shortness of Breath, Cough


Cardiovascular: Reports: No Symptoms


Endocrine: Reports: No Symptoms


GI/Abdominal: Reports: No Symptoms


: Reports: No Symptoms


Musculoskeletal: Reports: No Symptoms


Skin: Reports: No Symptoms


Neurological: Reports: No Symptoms


Psychiatric: Reports: No Symptoms





ED EXAM, GENERAL





- Physical Exam


Exam: See Below


Exam Limited By: No Limitations


General Appearance: Alert, No Apparent Distress


Ears: Normal External Exam, Normal Canal, Hearing Grossly Normal


Nose: Normal Inspection, Normal Mucosa, No Blood


Throat/Mouth: Normal Inspection, Normal Lips, Normal Teeth


Head: Atraumatic, Normocephalic


Neck: Normal Inspection, Supple, Non-Tender, Full Range of Motion


Respiratory/Chest: No Respiratory Distress, Normal Breath Sounds, Crackles, 

Rhonchi


Cardiovascular: Normal Peripheral Pulses, Regular Rate, Rhythm, No Edema, No 

Gallop, No JVD, No Murmur


GI/Abdominal: Normal Bowel Sounds, Soft, Non-Tender


Back Exam: Normal Inspection, Full Range of Motion


Extremities: Normal Inspection, Normal Range of Motion, Non-Tender, No Pedal 

Edema, Pedal Edema


Neurological: Alert, Oriented, CN II-XII Intact


  ** #1 Interpretation


EKG Date: 11/08/21


Time: 10:58


Rhythm: Other (Pacemaker Rhythm)


Rate (Beats/Min): 71


Axis: Normal


P-Wave: Present


QRS: Normal


ST-T: Normal


QT: Normal


ND/PQ Interval: 71


Comparison: No Change


EKG Interpretation Comments: 





Pacemaker Rhythm





Course





- Vital Signs


Text/Narrative:: 





Lab/EKG, CXR result was reviewed and discussed with patient


Zaroxolyn 2.5 mg PO x1


Lasix 40 mg IV x1


Last Recorded V/S: 


                                Last Vital Signs











Temp  36.9 C   11/09/21 08:45


 


Pulse  67   11/09/21 08:45


 


Resp  16   11/09/21 08:45


 


BP  118/52 L  11/09/21 08:45


 


Pulse Ox  98   11/09/21 08:45














- Orders/Labs/Meds


Orders: 


                               Active Orders 24 hr











 Category Date Time Status


 


 Chest 1V Frontal [CR] Stat Exams  11/08/21 10:21 Taken


 


 Acetaminophen [Tylenol Extra Strength] Med  11/08/21 11:33 Active





 1,000 mg PO Q6H PRN   


 


 Nitroglycerin [Nitrostat] Med  11/08/21 11:33 Active





 0.4 mg SL Q5M PRN   


 


 Pantoprazole [ProTONIX***] Med  11/08/21 21:00 Active





 40 mg PO BID   


 


 Potassium Chloride [Klor-Con 10] Med  11/09/21 09:00 Active





 30 meq PO DAILY   


 


 Sodium Chloride 0.9% [Saline Flush] Med  11/08/21 10:21 Active





 10 ml FLUSH ASDIRECTED PRN   


 


 carvediloL [Coreg] Med  11/08/21 21:00 Active





 6.25 mg PO BID   


 


 metOLazone [Zaroxolyn] Med  11/09/21 09:00 Active





 5 mg PO DAILY   


 


 Saline Lock Insert [OM.PC] Routine Oth  11/08/21 10:21 Ordered


 


 EKG 12 Lead [EK] Routine Ther  11/08/21 10:21 Stop Req








                                Medication Orders





Acetaminophen (Acetaminophen 500 Mg Tab)  1,000 mg PO Q6H PRN


   PRN Reason: Pain


Carvedilol (Carvedilol 6.25 Mg Tab)  6.25 mg PO BID [facility]


   Last Admin: 11/09/21 08:39  Dose: 6.25 mg


   Documented by: JEANETH


   Admin: 11/08/21 20:34  Dose: 6.25 mg


   Documented by: JOYCE


Furosemide (Furosemide 40 Mg/4 Ml Vial)  40 mg IVPUSH BIDDIURETIC [facility]


   Last Admin: 11/08/21 17:30  Dose: 40 mg


   Documented by: ЕЛЕНА


Melatonin (Melatonin 3 Mg Tab)  9 mg PO BEDTIME [facility]


   Last Admin: 11/08/21 20:31  Dose: 9 mg


   Documented by: JOYCE


Metolazone (Metolazone 5 Mg Tab)  5 mg PO DAILY [facility]


   Last Admin: 11/09/21 08:38  Dose: 5 mg


   Documented by: JEANETH


Nitroglycerin (Nitroglycerin 0.4 Mg Tab.Sl)  0.4 mg SL Q5M PRN


   PRN Reason: Chest Pain


Pantoprazole Sodium (Pantoprazole 40 Mg Tab.Cr)  40 mg PO BID [facility]


   Last Admin: 11/09/21 08:39  Dose: 40 mg


   Documented by: JEANETH


   Admin: 11/08/21 20:31  Dose: 40 mg


   Documented by: JOYCE


Potassium Chloride (Potassium Chloride 10 Meq Tab.Er)  30 meq PO DAILY [facility]


   Last Admin: 11/09/21 08:38  Dose: 30 meq


   Documented by: JEANETH


Senna/Docusate Sodium (Docusate Sodium/Sennosides 50-8.6 Mg Tab)  2 tab PO DAILY

 PRN


   PRN Reason: Constipation


Sodium Chloride (Sodium Chloride 0.9% 10 Ml Syringe)  10 ml FLUSH ASDIRECTED PRN


   PRN Reason: Keep Vein Open


   Last Admin: 11/08/21 11:07  Dose: 10 ml


   Documented by: MURALI


Trazodone HCl (Trazodone 50 Mg Tab)  50 mg PO BEDTIME [facility]


   Last Admin: 11/08/21 20:31  Dose: 50 mg


   Documented by: JOYCE








Labs: 


                                Laboratory Tests











  11/08/21 11/08/21 11/08/21 Range/Units





  10:49 10:49 10:49 


 


WBC  7.1    (3.2-10.1)  x10-3/uL


 


RBC  3.03 L    (3.90-5.90)  x10(6)uL


 


Hgb  8.4 L    (12.9-17.7)  g/dL


 


Hct  27.1 L    (38.3-50.1)  %


 


MCV  89.3    (80.8-98.7)  fL


 


MCH  27.7    (27.0-33.3)  pg


 


MCHC  31.0    (28.7-35.3)  g/dL


 


RDW  18.1 H    (12.4-15.0)  %


 


Plt Count  220    (117-477)  x10(3)uL


 


MPV  8.5    (6.7-11.0)  fL


 


Neut % (Auto)  75.9 H    (40.3-71.8)  %


 


Lymph % (Auto)  12.0 L    (15.8-45.3)  %


 


Mono % (Auto)  10.9    (5.5-15.2)  %


 


Eos % (Auto)  0.7    (0.1-6.8)  %


 


Baso % (Auto)  0.5    (0.3-3.8)  %


 


Neut # (Auto)  5.4    (1.7-6.9)  x10-3/uL


 


Lymph # (Auto)  0.9    (0.5-4.5)  x10-3/uL


 


Mono # (Auto)  0.8    (0.0-1.2)  x10-3/uL


 


Eos # (Auto)  0.1    (0.0-0.6)  x10-3/uL


 


Baso # (Auto)  0.0    (0.0-0.3)  x10-3/uL


 


PT   59.4 H*   (9.0-11.1)  sec


 


INR   6.20 H*   (1.00-1.24)  


 


Sodium    137  (135-145)  mmol/L


 


Potassium    4.5  D  (3.5-5.3)  mmol/L


 


Chloride    100  (100-110)  mmol/L


 


Carbon Dioxide    27  (21-32)  mmol/L


 


BUN    53 H  (7-18)  mg/dL


 


Creatinine    1.9 H  (0.70-1.30)  mg/dL


 


Est Cr Clr Drug Dosing    27.39  mL/min


 


Estimated GFR (MDRD)    34 L  (>60)  


 


BUN/Creatinine Ratio    27.9 H  (9-20)  


 


Glucose    110  ()  mg/dL


 


Calcium    8.6  (8.6-10.2)  mg/dL


 


Total Bilirubin    1.0  (0.1-1.3)  mg/dL


 


AST    28 H D  (5-25)  IU/L


 


ALT    21  D  (12-36)  U/L


 


Alkaline Phosphatase    139 H  ()  IU/L


 


Troponin I     (4.0-60.3)  pg/mL


 


NT-Pro-B Natriuret Pep     (<=450)  pg/mL


 


Total Protein    6.2  (6.0-8.0)  g/dL


 


Albumin    3.1 L  (3.2-4.6)  g/dL


 


Globulin    3.1  g/dL


 


Albumin/Globulin Ratio    1.0  


 


SARS-CoV-2 RNA (MICHAEL)     (NEGATIVE)  














  11/08/21 11/08/21 Range/Units





  10:50 11:55 


 


WBC    (3.2-10.1)  x10-3/uL


 


RBC    (3.90-5.90)  x10(6)uL


 


Hgb    (12.9-17.7)  g/dL


 


Hct    (38.3-50.1)  %


 


MCV    (80.8-98.7)  fL


 


MCH    (27.0-33.3)  pg


 


MCHC    (28.7-35.3)  g/dL


 


RDW    (12.4-15.0)  %


 


Plt Count    (117-477)  x10(3)uL


 


MPV    (6.7-11.0)  fL


 


Neut % (Auto)    (40.3-71.8)  %


 


Lymph % (Auto)    (15.8-45.3)  %


 


Mono % (Auto)    (5.5-15.2)  %


 


Eos % (Auto)    (0.1-6.8)  %


 


Baso % (Auto)    (0.3-3.8)  %


 


Neut # (Auto)    (1.7-6.9)  x10-3/uL


 


Lymph # (Auto)    (0.5-4.5)  x10-3/uL


 


Mono # (Auto)    (0.0-1.2)  x10-3/uL


 


Eos # (Auto)    (0.0-0.6)  x10-3/uL


 


Baso # (Auto)    (0.0-0.3)  x10-3/uL


 


PT    (9.0-11.1)  sec


 


INR    (1.00-1.24)  


 


Sodium    (135-145)  mmol/L


 


Potassium    (3.5-5.3)  mmol/L


 


Chloride    (100-110)  mmol/L


 


Carbon Dioxide    (21-32)  mmol/L


 


BUN    (7-18)  mg/dL


 


Creatinine    (0.70-1.30)  mg/dL


 


Est Cr Clr Drug Dosing    mL/min


 


Estimated GFR (MDRD)    (>60)  


 


BUN/Creatinine Ratio    (9-20)  


 


Glucose    ()  mg/dL


 


Calcium    (8.6-10.2)  mg/dL


 


Total Bilirubin    (0.1-1.3)  mg/dL


 


AST    (5-25)  IU/L


 


ALT    (12-36)  U/L


 


Alkaline Phosphatase    ()  IU/L


 


Troponin I  107.3 H*   (4.0-60.3)  pg/mL


 


NT-Pro-B Natriuret Pep  20138 H*   (<=450)  pg/mL


 


Total Protein    (6.0-8.0)  g/dL


 


Albumin    (3.2-4.6)  g/dL


 


Globulin    g/dL


 


Albumin/Globulin Ratio    


 


SARS-CoV-2 RNA (MICHAEL)   Negative  (NEGATIVE)  











Meds: 


Medications











Generic Name Dose Route Start Last Admin





  Trade Name Freq  PRN Reason Stop Dose Admin


 


Acetaminophen  1,000 mg  11/08/21 11:33 





  Acetaminophen 500 Mg Tab  PO  





  Q6H PRN  





  Pain  


 


Carvedilol  6.25 mg  11/08/21 21:00  11/09/21 08:39





  Carvedilol 6.25 Mg Tab  PO   6.25 mg





  BID ANMOL   Administration


 


Furosemide  40 mg  11/08/21 17:00  11/08/21 17:30





  Furosemide 40 Mg/4 Ml Vial  IVPUSH   40 mg





  BIDDIURETIC ANMOL   Administration


 


Melatonin  9 mg  11/08/21 21:00  11/08/21 20:31





  Melatonin 3 Mg Tab  PO   9 mg





  BEDTIME ANMOL   Administration


 


Metolazone  5 mg  11/09/21 09:00  11/09/21 08:38





  Metolazone 5 Mg Tab  PO   5 mg





  DAILY ANMOL   Administration


 


Nitroglycerin  0.4 mg  11/08/21 11:33 





  Nitroglycerin 0.4 Mg Tab.Sl  SL  





  Q5M PRN  





  Chest Pain  


 


Pantoprazole Sodium  40 mg  11/08/21 21:00  11/09/21 08:39





  Pantoprazole 40 Mg Tab.Cr  PO   40 mg





  BID ANMOL   Administration


 


Potassium Chloride  30 meq  11/09/21 09:00  11/09/21 08:38





  Potassium Chloride 10 Meq Tab.Er  PO   30 meq





  DAILY ANMOL   Administration


 


Senna/Docusate Sodium  2 tab  11/08/21 13:56 





  Docusate Sodium/Sennosides 50-8.6 Mg Tab  PO  





  DAILY PRN  





  Constipation  


 


Sodium Chloride  10 ml  11/08/21 10:21  11/08/21 11:07





  Sodium Chloride 0.9% 10 Ml Syringe  FLUSH   10 ml





  ASDIRECTED PRN   Administration





  Keep Vein Open  


 


Trazodone HCl  50 mg  11/08/21 21:00  11/08/21 20:31





  Trazodone 50 Mg Tab  PO   50 mg





  BEDTIME ANMOL   Administration














Discontinued Medications














Generic Name Dose Route Start Last Admin





  Trade Name Freq  PRN Reason Stop Dose Admin


 


Coenzyme Q10  100 mg  11/09/21 09:00 





  Ubidecarenone 100 Mg Cap  PO  





  DAILY ANMOL  


 


Furosemide  40 mg  11/08/21 11:33  11/08/21 12:17





  Furosemide 40 Mg/4 Ml Vial  IVPUSH  11/08/21 11:34  40 mg





  NOW ONE   Administration


 


Phytonadione 10 mg/ Sodium  51 mls @ 100 mls/hr  11/08/21 11:28  11/08/21 12:17





  Chloride  IV  11/08/21 11:58  100 mls/hr





  NOW ONE   Administration


 


Melatonin  3 mg  11/08/21 21:00 





  Melatonin 3 Mg Tab  PO  





  BEDTIME ANMOL  


 


Metolazone  2.5 mg  11/08/21 11:33 





  Metolazone 2.5 Mg Tab  PO  





  WEEKLY PRN  





  Edema  


 


Trazodone HCl  25 mg  11/08/21 11:33 





  Trazodone 50 Mg Tab  PO  





  BEDTIME PRN  





  Insomnia  














Departure





- Departure


Time of Disposition: 11:30


Disposition: Admitted As Inpatient 66


Condition: Good


Clinical Impression: 


 Elevated troponin, Anemia





CHF (congestive heart failure)


Qualifiers:


 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 








- Discharge Information





Sepsis Event Note (ED)





- Evaluation


Sepsis Screening Result: No Definite Risk





- My Orders


Last 24 Hours: 


My Active Orders





11/08/21 10:21


Chest 1V Frontal [CR] Stat 


Sodium Chloride 0.9% [Saline Flush]   10 ml FLUSH ASDIRECTED PRN 


Saline Lock Insert [OM.PC] Routine 


EKG 12 Lead [EK] Routine 





11/08/21 11:33


Acetaminophen [Tylenol Extra Strength]   1,000 mg PO Q6H PRN 


Nitroglycerin [Nitrostat]   0.4 mg SL Q5M PRN 





11/08/21 21:00


Pantoprazole [ProTONIX***]   40 mg PO BID 


carvediloL [Coreg]   6.25 mg PO BID 





11/09/21 09:00


Potassium Chloride [Klor-Con 10]   30 meq PO DAILY 


metOLazone [Zaroxolyn]   5 mg PO DAILY 














- Assessment/Plan


Last 24 Hours: 


My Active Orders





11/08/21 10:21


Chest 1V Frontal [CR] Stat 


Sodium Chloride 0.9% [Saline Flush]   10 ml FLUSH ASDIRECTED PRN 


Saline Lock Insert [OM.PC] Routine 


EKG 12 Lead [EK] Routine 





11/08/21 11:33


Acetaminophen [Tylenol Extra Strength]   1,000 mg PO Q6H PRN 


Nitroglycerin [Nitrostat]   0.4 mg SL Q5M PRN 





11/08/21 21:00


Pantoprazole [ProTONIX***]   40 mg PO BID 


carvediloL [Coreg]   6.25 mg PO BID 





11/09/21 09:00


Potassium Chloride [Klor-Con 10]   30 meq PO DAILY 


metOLazone [Zaroxolyn]   5 mg PO DAILY

## 2021-11-09 RX ADMIN — POTASSIUM CHLORIDE SCH MEQ: 750 TABLET, FILM COATED, EXTENDED RELEASE ORAL at 08:38

## 2021-11-09 RX ADMIN — Medication PRN ML: at 13:19

## 2021-11-09 RX ADMIN — Medication PRN ML: at 09:26

## 2021-11-09 NOTE — PCM.PN
- General Info


Date of Service: 11/09/21


Admission Dx/Problem (Free Text): 


                           Admission Diagnosis/Problem





Admission Diagnosis/Problem      CHF, Congestive heart failure








Subjective Update: 





Patient feels well today and he is concerned that he is having some dark stools,

he otherwise has no acute complaints and no new pain


Functional Status: Reports: Pain Controlled, Tolerating Diet, Ambulating, 

Urinating





- Review of Systems


General: Reports: No Symptoms


HEENT: Reports: No Symptoms


Pulmonary: Reports: Shortness of Breath


Cardiovascular: Reports: No Symptoms


Gastrointestinal: Reports: No Symptoms


Genitourinary: Reports: No Symptoms


Musculoskeletal: Reports: No Symptoms


Skin: Reports: No Symptoms


Neurological: Reports: No Symptoms


Psychiatric: Reports: No Symptoms





- Patient Data


Vitals - Most Recent: 


                                Last Vital Signs











Temp  37.2 C   11/09/21 04:00


 


Pulse  67   11/09/21 08:39


 


Resp  18   11/09/21 04:00


 


BP  118/52 L  11/09/21 08:39


 


Pulse Ox  97   11/09/21 04:00











Weight - Most Recent: 73.936 kg


Lab Results Last 24 Hours: 


                         Laboratory Results - last 24 hr











  11/08/21 11/08/21 11/08/21 Range/Units





  10:49 10:49 10:49 


 


WBC  7.1    (3.2-10.1)  x10-3/uL


 


RBC  3.03 L    (3.90-5.90)  x10(6)uL


 


Hgb  8.4 L    (12.9-17.7)  g/dL


 


Hct  27.1 L    (38.3-50.1)  %


 


MCV  89.3    (80.8-98.7)  fL


 


MCH  27.7    (27.0-33.3)  pg


 


MCHC  31.0    (28.7-35.3)  g/dL


 


RDW  18.1 H    (12.4-15.0)  %


 


Plt Count  220    (117-477)  x10(3)uL


 


MPV  8.5    (6.7-11.0)  fL


 


Neut % (Auto)  75.9 H    (40.3-71.8)  %


 


Lymph % (Auto)  12.0 L    (15.8-45.3)  %


 


Mono % (Auto)  10.9    (5.5-15.2)  %


 


Eos % (Auto)  0.7    (0.1-6.8)  %


 


Baso % (Auto)  0.5    (0.3-3.8)  %


 


Neut # (Auto)  5.4    (1.7-6.9)  x10-3/uL


 


Lymph # (Auto)  0.9    (0.5-4.5)  x10-3/uL


 


Mono # (Auto)  0.8    (0.0-1.2)  x10-3/uL


 


Eos # (Auto)  0.1    (0.0-0.6)  x10-3/uL


 


Baso # (Auto)  0.0    (0.0-0.3)  x10-3/uL


 


PT   59.4 H*   (9.0-11.1)  sec


 


INR   6.20 H*   (1.00-1.24)  


 


Sodium    137  (135-145)  mmol/L


 


Potassium    4.5  D  (3.5-5.3)  mmol/L


 


Chloride    100  (100-110)  mmol/L


 


Carbon Dioxide    27  (21-32)  mmol/L


 


BUN    53 H  (7-18)  mg/dL


 


Creatinine    1.9 H  (0.70-1.30)  mg/dL


 


Est Cr Clr Drug Dosing    27.39  mL/min


 


Estimated GFR (MDRD)    34 L  (>60)  


 


BUN/Creatinine Ratio    27.9 H  (9-20)  


 


Glucose    110  ()  mg/dL


 


Calcium    8.6  (8.6-10.2)  mg/dL


 


Total Bilirubin    1.0  (0.1-1.3)  mg/dL


 


AST    28 H D  (5-25)  IU/L


 


ALT    21  D  (12-36)  U/L


 


Alkaline Phosphatase    139 H  ()  IU/L


 


Troponin I     (4.0-60.3)  pg/mL


 


NT-Pro-B Natriuret Pep     (<=450)  pg/mL


 


Total Protein    6.2  (6.0-8.0)  g/dL


 


Albumin    3.1 L  (3.2-4.6)  g/dL


 


Globulin    3.1  g/dL


 


Albumin/Globulin Ratio    1.0  


 


SARS-CoV-2 RNA (MICHAEL)     (NEGATIVE)  














  11/08/21 11/08/21 11/09/21 Range/Units





  10:50 11:55 06:07 


 


WBC     (3.2-10.1)  x10-3/uL


 


RBC     (3.90-5.90)  x10(6)uL


 


Hgb     (12.9-17.7)  g/dL


 


Hct     (38.3-50.1)  %


 


MCV     (80.8-98.7)  fL


 


MCH     (27.0-33.3)  pg


 


MCHC     (28.7-35.3)  g/dL


 


RDW     (12.4-15.0)  %


 


Plt Count     (117-477)  x10(3)uL


 


MPV     (6.7-11.0)  fL


 


Neut % (Auto)     (40.3-71.8)  %


 


Lymph % (Auto)     (15.8-45.3)  %


 


Mono % (Auto)     (5.5-15.2)  %


 


Eos % (Auto)     (0.1-6.8)  %


 


Baso % (Auto)     (0.3-3.8)  %


 


Neut # (Auto)     (1.7-6.9)  x10-3/uL


 


Lymph # (Auto)     (0.5-4.5)  x10-3/uL


 


Mono # (Auto)     (0.0-1.2)  x10-3/uL


 


Eos # (Auto)     (0.0-0.6)  x10-3/uL


 


Baso # (Auto)     (0.0-0.3)  x10-3/uL


 


PT     (9.0-11.1)  sec


 


INR     (1.00-1.24)  


 


Sodium     (135-145)  mmol/L


 


Potassium     (3.5-5.3)  mmol/L


 


Chloride     (100-110)  mmol/L


 


Carbon Dioxide     (21-32)  mmol/L


 


BUN     (7-18)  mg/dL


 


Creatinine     (0.70-1.30)  mg/dL


 


Est Cr Clr Drug Dosing     mL/min


 


Estimated GFR (MDRD)     (>60)  


 


BUN/Creatinine Ratio     (9-20)  


 


Glucose     ()  mg/dL


 


Calcium     (8.6-10.2)  mg/dL


 


Total Bilirubin     (0.1-1.3)  mg/dL


 


AST     (5-25)  IU/L


 


ALT     (12-36)  U/L


 


Alkaline Phosphatase     ()  IU/L


 


Troponin I  107.3 H*   119.9 H*  (4.0-60.3)  pg/mL


 


NT-Pro-B Natriuret Pep  85005 H*    (<=450)  pg/mL


 


Total Protein     (6.0-8.0)  g/dL


 


Albumin     (3.2-4.6)  g/dL


 


Globulin     g/dL


 


Albumin/Globulin Ratio     


 


SARS-CoV-2 RNA (MICHAEL)   Negative   (NEGATIVE)  











Med Orders - Current: 


                               Current Medications





Acetaminophen (Acetaminophen 500 Mg Tab)  1,000 mg PO Q6H PRN


   PRN Reason: Pain


Carvedilol (Carvedilol 6.25 Mg Tab)  6.25 mg PO BID Formerly [facility], 1928–1983


   Last Admin: 11/09/21 08:39 Dose:  6.25 mg


   Documented by: 


Furosemide (Furosemide 40 Mg/4 Ml Vial)  40 mg IVPUSH BIDDIURETIC Formerly [facility], 1928–1983


   Last Admin: 11/08/21 17:30 Dose:  40 mg


   Documented by: 


Melatonin (Melatonin 3 Mg Tab)  9 mg PO BEDTIME Formerly [facility], [facility]–1983


   Last Admin: 11/08/21 20:31 Dose:  9 mg


   Documented by: 


Metolazone (Metolazone 5 Mg Tab)  5 mg PO DAILY Formerly [facility], 1928–1983


   Last Admin: 11/09/21 08:38 Dose:  5 mg


   Documented by: 


Nitroglycerin (Nitroglycerin 0.4 Mg Tab.Sl)  0.4 mg SL Q5M PRN


   PRN Reason: Chest Pain


Pantoprazole Sodium (Pantoprazole 40 Mg Tab.Cr)  40 mg PO BID Formerly [facility], 1928–1983


   Last Admin: 11/09/21 08:39 Dose:  40 mg


   Documented by: 


Potassium Chloride (Potassium Chloride 10 Meq Tab.Er)  30 meq PO DAILY Formerly [facility], 1928–1983


   Last Admin: 11/09/21 08:38 Dose:  30 meq


   Documented by: 


Senna/Docusate Sodium (Docusate Sodium/Sennosides 50-8.6 Mg Tab)  2 tab PO DAILY

PRN


   PRN Reason: Constipation


Sodium Chloride (Sodium Chloride 0.9% 10 Ml Syringe)  10 ml FLUSH ASDIRECTED PRN


   PRN Reason: Keep Vein Open


   Last Admin: 11/08/21 11:07 Dose:  10 ml


   Documented by: 


Trazodone HCl (Trazodone 50 Mg Tab)  50 mg PO BEDTIME Formerly [facility], 1928–1983


   Last Admin: 11/08/21 20:31 Dose:  50 mg


   Documented by: 





Discontinued Medications





Coenzyme Q10 (Ubidecarenone 100 Mg Cap)  100 mg PO DAILY Formerly [facility], 1928–1983


Furosemide (Furosemide 40 Mg/4 Ml Vial)  40 mg IVPUSH NOW ONE


   Stop: 11/08/21 11:34


   Last Admin: 11/08/21 12:17 Dose:  40 mg


   Documented by: 


Phytonadione 10 mg/ Sodium (Chloride)  51 mls @ 100 mls/hr IV NOW ONE


   Stop: 11/08/21 11:58


   Last Admin: 11/08/21 12:17 Dose:  100 mls/hr


   Documented by: 


Melatonin (Melatonin 3 Mg Tab)  3 mg PO BEDTIME ANMOL


Metolazone (Metolazone 2.5 Mg Tab)  2.5 mg PO WEEKLY PRN


   PRN Reason: Edema


Trazodone HCl (Trazodone 50 Mg Tab)  25 mg PO BEDTIME PRN


   PRN Reason: Insomnia








Comments:: 





Patient is sitting in bedside chair next to the window eating breakfast





- Exam


Quality Assessment: Supplemental Oxygen, DVT Prophylaxis


General: Alert, Oriented, Cooperative, No Acute Distress


HEENT: EOMI


Neck: Supple


Lungs: Crackles


Cardiovascular: Regular Rate, Murmurs


GI/Abdominal Exam: Normal Bowel Sounds, Soft, Non-Tender


Back Exam: Normal Inspection


Extremities: Pedal Edema


Peripheral Pulses: 2+: Radial (L), Radial (R)


Skin: Ecchymosis


Wound/Incisions: Dressing Dry and Intact, No Drainage


Neurological: No New Focal Deficit


Psy/Mental Status: Alert, Normal Affect, Normal Mood





- Patient Data


Lab Results Last 24 hrs: 


                         Laboratory Results - last 24 hr











  11/08/21 11/08/21 11/08/21 Range/Units





  10:49 10:49 10:49 


 


WBC  7.1    (3.2-10.1)  x10-3/uL


 


RBC  3.03 L    (3.90-5.90)  x10(6)uL


 


Hgb  8.4 L    (12.9-17.7)  g/dL


 


Hct  27.1 L    (38.3-50.1)  %


 


MCV  89.3    (80.8-98.7)  fL


 


MCH  27.7    (27.0-33.3)  pg


 


MCHC  31.0    (28.7-35.3)  g/dL


 


RDW  18.1 H    (12.4-15.0)  %


 


Plt Count  220    (117-477)  x10(3)uL


 


MPV  8.5    (6.7-11.0)  fL


 


Neut % (Auto)  75.9 H    (40.3-71.8)  %


 


Lymph % (Auto)  12.0 L    (15.8-45.3)  %


 


Mono % (Auto)  10.9    (5.5-15.2)  %


 


Eos % (Auto)  0.7    (0.1-6.8)  %


 


Baso % (Auto)  0.5    (0.3-3.8)  %


 


Neut # (Auto)  5.4    (1.7-6.9)  x10-3/uL


 


Lymph # (Auto)  0.9    (0.5-4.5)  x10-3/uL


 


Mono # (Auto)  0.8    (0.0-1.2)  x10-3/uL


 


Eos # (Auto)  0.1    (0.0-0.6)  x10-3/uL


 


Baso # (Auto)  0.0    (0.0-0.3)  x10-3/uL


 


PT   59.4 H*   (9.0-11.1)  sec


 


INR   6.20 H*   (1.00-1.24)  


 


Sodium    137  (135-145)  mmol/L


 


Potassium    4.5  D  (3.5-5.3)  mmol/L


 


Chloride    100  (100-110)  mmol/L


 


Carbon Dioxide    27  (21-32)  mmol/L


 


BUN    53 H  (7-18)  mg/dL


 


Creatinine    1.9 H  (0.70-1.30)  mg/dL


 


Est Cr Clr Drug Dosing    27.39  mL/min


 


Estimated GFR (MDRD)    34 L  (>60)  


 


BUN/Creatinine Ratio    27.9 H  (9-20)  


 


Glucose    110  ()  mg/dL


 


Calcium    8.6  (8.6-10.2)  mg/dL


 


Total Bilirubin    1.0  (0.1-1.3)  mg/dL


 


AST    28 H D  (5-25)  IU/L


 


ALT    21  D  (12-36)  U/L


 


Alkaline Phosphatase    139 H  ()  IU/L


 


Troponin I     (4.0-60.3)  pg/mL


 


NT-Pro-B Natriuret Pep     (<=450)  pg/mL


 


Total Protein    6.2  (6.0-8.0)  g/dL


 


Albumin    3.1 L  (3.2-4.6)  g/dL


 


Globulin    3.1  g/dL


 


Albumin/Globulin Ratio    1.0  


 


SARS-CoV-2 RNA (MICHAEL)     (NEGATIVE)  














  11/08/21 11/08/21 11/09/21 Range/Units





  10:50 11:55 06:07 


 


WBC     (3.2-10.1)  x10-3/uL


 


RBC     (3.90-5.90)  x10(6)uL


 


Hgb     (12.9-17.7)  g/dL


 


Hct     (38.3-50.1)  %


 


MCV     (80.8-98.7)  fL


 


MCH     (27.0-33.3)  pg


 


MCHC     (28.7-35.3)  g/dL


 


RDW     (12.4-15.0)  %


 


Plt Count     (117-477)  x10(3)uL


 


MPV     (6.7-11.0)  fL


 


Neut % (Auto)     (40.3-71.8)  %


 


Lymph % (Auto)     (15.8-45.3)  %


 


Mono % (Auto)     (5.5-15.2)  %


 


Eos % (Auto)     (0.1-6.8)  %


 


Baso % (Auto)     (0.3-3.8)  %


 


Neut # (Auto)     (1.7-6.9)  x10-3/uL


 


Lymph # (Auto)     (0.5-4.5)  x10-3/uL


 


Mono # (Auto)     (0.0-1.2)  x10-3/uL


 


Eos # (Auto)     (0.0-0.6)  x10-3/uL


 


Baso # (Auto)     (0.0-0.3)  x10-3/uL


 


PT     (9.0-11.1)  sec


 


INR     (1.00-1.24)  


 


Sodium     (135-145)  mmol/L


 


Potassium     (3.5-5.3)  mmol/L


 


Chloride     (100-110)  mmol/L


 


Carbon Dioxide     (21-32)  mmol/L


 


BUN     (7-18)  mg/dL


 


Creatinine     (0.70-1.30)  mg/dL


 


Est Cr Clr Drug Dosing     mL/min


 


Estimated GFR (MDRD)     (>60)  


 


BUN/Creatinine Ratio     (9-20)  


 


Glucose     ()  mg/dL


 


Calcium     (8.6-10.2)  mg/dL


 


Total Bilirubin     (0.1-1.3)  mg/dL


 


AST     (5-25)  IU/L


 


ALT     (12-36)  U/L


 


Alkaline Phosphatase     ()  IU/L


 


Troponin I  107.3 H*   119.9 H*  (4.0-60.3)  pg/mL


 


NT-Pro-B Natriuret Pep  22518 H*    (<=450)  pg/mL


 


Total Protein     (6.0-8.0)  g/dL


 


Albumin     (3.2-4.6)  g/dL


 


Globulin     g/dL


 


Albumin/Globulin Ratio     


 


SARS-CoV-2 RNA (MICHAEL)   Negative   (NEGATIVE)  











Result Diagrams: 


                                 11/08/21 10:49





                                 11/08/21 10:49





Sepsis Event Note





- Evaluation


Sepsis Screening Result: No Definite Risk





- Focused Exam


Vital Signs: 


                                   Vital Signs











  Temp Pulse Pulse Resp BP BP Pulse Ox


 


 11/09/21 08:39   67    118/52 L  


 


 11/09/21 04:00  37.2 C   72  18   129/60  97


 


 11/09/21 00:00  36.6 C   74  18   134/66  97














- Problem List & Annotations


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


SNOMED Code(s): 22701004, 902634321


   Code(s): I50.40 - UNSP COMBINED SYSTOLIC AND DIASTOLIC (CONGESTIVE) HRT FAIL 

 Status: Chronic   Current Visit: Yes   





(2) Abdominal ascites


SNOMED Code(s): 486047754


   Code(s): R18.8 - OTHER ASCITES   Status: Acute   Current Visit: No   


Qualifiers: 


   Ascites type: other type   Qualified Code(s): R18.8 - Other ascites   





(3) Abrasions of multiple sites


SNOMED Code(s): 540530347, 766150577


   Code(s): T07.XXXA - UNSPECIFIED MULTIPLE INJURIES, INITIAL ENCOUNTER   

Status: Acute   Current Visit: No   





(4) Anemia


SNOMED Code(s): 546481971


   Code(s): D64.9 - ANEMIA, UNSPECIFIED   Status: Chronic   Current Visit: No   





(5) CHF exacerbation


SNOMED Code(s): 609225809, 05250687044856


   Code(s): I50.9 - HEART FAILURE, UNSPECIFIED   Status: Acute   Current Visit: 

No   


Qualifiers: 


   Heart failure type: unspecified   Qualified Code(s): I50.9 - Heart failure, 

unspecified   





(6) Decompensated heart failure


SNOMED Code(s): 417095730


   Code(s): I50.9 - HEART FAILURE, UNSPECIFIED   Status: Acute   Current Visit: 

No   





(7) Elevated INR


SNOMED Code(s): 932452135


   Code(s): R79.1 - ABNORMAL COAGULATION PROFILE   Status: Acute   Current 

Visit: No   





(8) Elevated troponin


SNOMED Code(s): 912866208, 921587042, 643902117


   Code(s): R77.8 - OTHER SPECIFIED ABNORMALITIES OF PLASMA PROTEINS   Status: 

Acute   Current Visit: No   





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


SNOMED Code(s): 7136592


   Code(s): I25.2 - OLD MYOCARDIAL INFARCTION   Status: Chronic   Current Visit:

No   





(10) Peripheral edema


SNOMED Code(s): 016054816


   Code(s): R60.9 - EDEMA, UNSPECIFIED   Status: Acute   Current Visit: No   





(11) Weakness


SNOMED Code(s): 10312877


   Code(s): R53.1 - WEAKNESS   Status: Acute   Current Visit: No   





(12) CAD (coronary artery disease)


SNOMED Code(s): 37839566


   Code(s): I25.10 - ATHSCL HEART DISEASE OF NATIVE CORONARY ARTERY W/O ANG 

PCTRS   Status: Chronic   Current Visit: No   


Qualifiers: 


   Coronary Disease-Associated Artery/Lesion type: bypass graft   Associated 

angina: without angina 





(13) COPD (chronic obstructive pulmonary disease)


SNOMED Code(s): 50709449


   Code(s): J44.9 - CHRONIC OBSTRUCTIVE PULMONARY DISEASE, UNSPECIFIED   Status:

Chronic   Current Visit: No   


Qualifiers: 


   Chronic bronchitis type: unspecified 





(14) Cardiac pacemaker in situ


SNOMED Code(s): 217234071


   Code(s): Z95.0 - PRESENCE OF CARDIAC PACEMAKER   Status: Chronic   Current 

Visit: No   





(15) HTN (hypertension)


SNOMED Code(s): 69390516


   Code(s): I10 - ESSENTIAL (PRIMARY) HYPERTENSION   Status: Chronic   Current 

Visit: No   


Qualifiers: 


   Hypertension type: essential hypertension 





(16) Hyperlipidemia


SNOMED Code(s): 94480199


   Code(s): E78.5 - HYPERLIPIDEMIA, UNSPECIFIED   Status: Chronic   Current 

Visit: Yes   





(17) Chronic kidney disease, stage 3b


SNOMED Code(s): 311427851


   Code(s): N18.32 - CHRONIC KIDNEY DISEASE, STAGE 3B   Status: Chronic   

Current Visit: Yes   





(18) Encounter for palliative care


SNOMED Code(s): 928930045, 497716957


   Code(s): Z51.5 - ENCOUNTER FOR PALLIATIVE CARE   Status: Acute   Current 

Visit: Yes   





- Problem List Review


Problem List Initiated/Reviewed/Updated: Yes





- My Orders


Last 24 Hours: 


My Active Orders





11/08/21 13:56


Docusate Sodium/Sennosides [Senna Plus]   2 tab PO DAILY PRN 





11/08/21 14:04


Patient Status [ADT] Routine 


Pulse Oximetry [RC] PRN 


Vital Signs [RC] 04,08,12,16,20,00 


DVT/VTE Prophylaxis Reflex [OM.PC] Per Unit Routine 


Resuscitation Status Routine 





11/08/21 14:08


Antiembolic Devices [RC] .PRN 


VTE/DVT Education [RC] DAILY 





11/08/21 Dinner


Heart Healthy Diet [DIET] 





11/08/21 17:00


Furosemide [Lasix]   40 mg IVPUSH BIDDIURETIC 





11/08/21 18:16


OT Evaluation and Treatment [CONS] Routine 


PT Evaluation and Treatment [CONS] Routine 





11/08/21 21:00


Melatonin   9 mg PO BEDTIME 














- Plan


Plan:: 


1.  Elevated INR:  Hold Coumadin at this time.  We will try to coordinate with 

his cardiologist/primary care physician to determine goals of care regarding 

anticoagulation


2.  Acute exacerbation of congestive heart failure: Patient was being treated at

 home with Bumex.  He been gaining weight steadily and having increasing dyspnea

 on exertion over the last several weeks.  We will continue giving metolazone 

and IV Lasix twice daily, daily weights


3.  Weakness/dyspnea on exertion: PT/OT, treatments as above


4.  Chronic medical problems: Continue home medications as indicated


5.  DVT prophylaxis: SCDs at night, compression hose during the day.  We will 

restart Coumadin and/or enoxaparin and/or NOAC as indicated in consultation with

 primary care/cardiology


6.  GI prophylaxis: Heart healthy diet, home medicationpantoprazole 40 mg twice

 daily


7.  Disposition: Patient will need significant diuresis and OT/PT evaluation and

 treatment, likely 3 to 4 days

## 2021-11-10 PROCEDURE — 30233N1 TRANSFUSION OF NONAUTOLOGOUS RED BLOOD CELLS INTO PERIPHERAL VEIN, PERCUTANEOUS APPROACH: ICD-10-PCS | Performed by: FAMILY MEDICINE

## 2021-11-10 RX ADMIN — POTASSIUM CHLORIDE SCH MEQ: 750 TABLET, FILM COATED, EXTENDED RELEASE ORAL at 09:35

## 2021-11-10 RX ADMIN — Medication PRN ML: at 20:05

## 2021-11-10 RX ADMIN — Medication PRN ML: at 09:20

## 2021-11-10 RX ADMIN — Medication PRN ML: at 13:32

## 2021-11-10 NOTE — PCM.PN
- General Info


Date of Service: 11/10/21


Admission Dx/Problem (Free Text): 


                           Admission Diagnosis/Problem





Admission Diagnosis/Problem      CHF, Congestive heart failure








Subjective Update: 


Patient states that he has some occasional pain and points to his abdomen in the

area of the right upper quadrant he otherwise has no new complaints today than 

difficulty sleeping





Functional Status: Reports: Pain Controlled, Tolerating Diet, Ambulating, 

Urinating





- Review of Systems


General: Reports: No Symptoms


HEENT: Reports: No Symptoms


Pulmonary: Reports: No Symptoms


Cardiovascular: Reports: No Symptoms


Gastrointestinal: Reports: Abdominal Pain


Genitourinary: Reports: No Symptoms


Musculoskeletal: Reports: No Symptoms


Skin: Reports: No Symptoms


Neurological: Reports: No Symptoms


Psychiatric: Reports: Other (Insomnia)





- Patient Data


Vitals - Most Recent: 


                                Last Vital Signs











Temp  36.9 C   11/10/21 08:00


 


Pulse  76   11/10/21 08:16


 


Resp  16   11/10/21 08:00


 


BP  109/55 L  11/10/21 08:16


 


Pulse Ox  98   11/10/21 08:07











Weight - Most Recent: 74.162 kg


I&O - Last 24 Hours: 


                                 Intake & Output











 11/09/21 11/10/21 11/10/21





 22:59 06:59 14:59


 


Intake Total 240  


 


Balance 240  











Lab Results Last 24 Hours: 


                         Laboratory Results - last 24 hr











  11/09/21 11/09/21 11/09/21 Range/Units





  10:15 10:15 10:15 


 


Hgb   7.4 L   (12.9-17.7)  g/dL


 


Hct   23.5 L   (38.3-50.1)  %


 


PT  13.0 H    (9.0-11.1)  sec


 


INR  1.22    (1.00-1.24)  


 


Sodium     (135-145)  mmol/L


 


Potassium     (3.5-5.3)  mmol/L


 


Chloride     (100-110)  mmol/L


 


Carbon Dioxide     (21-32)  mmol/L


 


BUN     (7-18)  mg/dL


 


Creatinine     (0.70-1.30)  mg/dL


 


Est Cr Clr Drug Dosing     mL/min


 


Estimated GFR (MDRD)     (>60)  


 


BUN/Creatinine Ratio     (9-20)  


 


Glucose     ()  mg/dL


 


Calcium     (8.6-10.2)  mg/dL


 


Blood Type    O POSITIVE  


 


Gel Antibody Screen    Negative  


 


Crossmatch    See Detail  














  11/09/21 11/09/21 11/09/21 Range/Units





  14:10 18:05 22:05 


 


Hgb  7.5 L  7.7 L  6.9 L*  (12.9-17.7)  g/dL


 


Hct  23.2 L  24.1 L  21.7 L*  (38.3-50.1)  %


 


PT     (9.0-11.1)  sec


 


INR     (1.00-1.24)  


 


Sodium     (135-145)  mmol/L


 


Potassium     (3.5-5.3)  mmol/L


 


Chloride     (100-110)  mmol/L


 


Carbon Dioxide     (21-32)  mmol/L


 


BUN     (7-18)  mg/dL


 


Creatinine     (0.70-1.30)  mg/dL


 


Est Cr Clr Drug Dosing     mL/min


 


Estimated GFR (MDRD)     (>60)  


 


BUN/Creatinine Ratio     (9-20)  


 


Glucose     ()  mg/dL


 


Calcium     (8.6-10.2)  mg/dL


 


Blood Type     


 


Gel Antibody Screen     


 


Crossmatch     














  11/10/21 11/10/21 11/10/21 Range/Units





  06:30 06:30 06:30 


 


Hgb   6.8 L*   (12.9-17.7)  g/dL


 


Hct   21.8 L*   (38.3-50.1)  %


 


PT  12.7 H    (9.0-11.1)  sec


 


INR  1.19    (1.00-1.24)  


 


Sodium    137  (135-145)  mmol/L


 


Potassium    3.0 L D  (3.5-5.3)  mmol/L


 


Chloride    99 L  (100-110)  mmol/L


 


Carbon Dioxide    28  (21-32)  mmol/L


 


BUN    56 H  (7-18)  mg/dL


 


Creatinine    1.7 H  (0.70-1.30)  mg/dL


 


Est Cr Clr Drug Dosing    29.62  mL/min


 


Estimated GFR (MDRD)    38 L  (>60)  


 


BUN/Creatinine Ratio    32.9 H  (9-20)  


 


Glucose    94  ()  mg/dL


 


Calcium    8.6  (8.6-10.2)  mg/dL


 


Blood Type     


 


Gel Antibody Screen     


 


Crossmatch     











Med Orders - Current: 


                               Current Medications





Acetaminophen (Acetaminophen 500 Mg Tab)  1,000 mg PO Q6H PRN


   PRN Reason: Pain


   Last Admin: 11/09/21 19:52 Dose:  1,000 mg


   Documented by: 


Carvedilol (Carvedilol 6.25 Mg Tab)  6.25 mg PO BID [facility]


   Last Admin: 11/10/21 08:16 Dose:  6.25 mg


   Documented by: 


Enoxaparin Sodium (Enoxaparin 80 Mg/0.8 Ml Syringe)  70 mg SUBCUT Q24H [facility]


   Last Admin: 11/09/21 13:19 Dose:  70 mg


   Documented by: 


Furosemide (Furosemide 40 Mg/4 Ml Vial)  40 mg IVPUSH BIDDIURETIC [facility]


   Last Admin: 11/10/21 08:16 Dose:  40 mg


   Documented by: 


Sodium Chloride (Normal Saline)  250 mls @ 100 mls/hr IV ASDIRECTED [facility]


Melatonin (Melatonin 3 Mg Tab)  9 mg PO BEDTIME [facility]


   Last Admin: 11/09/21 19:59 Dose:  9 mg


   Documented by: 


Metolazone (Metolazone 5 Mg Tab)  5 mg PO DAILY [facility]


   Last Admin: 11/10/21 08:16 Dose:  5 mg


   Documented by: 


Nitroglycerin (Nitroglycerin 0.4 Mg Tab.Sl)  0.4 mg SL Q5M PRN


   PRN Reason: Chest Pain


Pantoprazole Sodium (Pantoprazole 40 Mg Tab.Cr)  40 mg PO BID [facility]


   Last Admin: 11/10/21 08:16 Dose:  40 mg


   Documented by: 


Potassium Chloride (Potassium Chloride 10 Meq Tab.Er)  30 meq PO DAILY [facility]


   Last Admin: 11/09/21 08:38 Dose:  30 meq


   Documented by: 


Senna/Docusate Sodium (Docusate Sodium/Sennosides 50-8.6 Mg Tab)  2 tab PO DAILY

PRN


   PRN Reason: Constipation


Sodium Chloride (Sodium Chloride 0.9% 10 Ml Syringe)  10 ml FLUSH ASDIRECTED PRN


   PRN Reason: Keep Vein Open


   Last Admin: 11/09/21 13:19 Dose:  10 ml


   Documented by: 


Trazodone HCl (Trazodone 50 Mg Tab)  50 mg PO BEDTIME [facility]


   Last Admin: 11/09/21 19:59 Dose:  50 mg


   Documented by: 


Warfarin Sodium (Warfarin 1 Mg Tab)  1 mg PO DAILY@1600 [facility]


   Last Admin: 11/09/21 16:36 Dose:  1 mg


   Documented by: 


Warfarin Sodium (Warfarin Sliding Scale)  1 each PO ASDIRECTED [facility]


Warfarin Sodium (Warfarin 2.5 Mg Tab)  2.5 mg PO ONETIME ONE


   Stop: 11/10/21 16:01





Discontinued Medications





Coenzyme Q10 (Ubidecarenone 100 Mg Cap)  100 mg PO DAILY ANMOL


Furosemide (Furosemide 40 Mg/4 Ml Vial)  40 mg IVPUSH NOW ONE


   Stop: 11/08/21 11:34


   Last Admin: 11/08/21 12:17 Dose:  40 mg


   Documented by: 


Phytonadione 10 mg/ Sodium (Chloride)  51 mls @ 100 mls/hr IV NOW ONE


   Stop: 11/08/21 11:58


   Last Admin: 11/08/21 12:17 Dose:  100 mls/hr


   Documented by: 


Melatonin (Melatonin 3 Mg Tab)  3 mg PO BEDTIME ANMOL


Metolazone (Metolazone 2.5 Mg Tab)  2.5 mg PO WEEKLY PRN


   PRN Reason: Edema


Potassium Chloride (Potassium Chloride 20 Meq Tab.Er)  40 meq PO ONETIME ONE


   Stop: 11/10/21 07:53


   Last Admin: 11/10/21 08:16 Dose:  40 meq


   Documented by: 


Trazodone HCl (Trazodone 50 Mg Tab)  25 mg PO BEDTIME PRN


   PRN Reason: Insomnia








Comments:: 


Patient sitting in bedside chair eating breakfast








- Exam


Quality Assessment: Supplemental Oxygen, DVT Prophylaxis


General: Alert, Oriented, Cooperative, No Acute Distress


HEENT: EOMI


Neck: Supple


Lungs: Crackles


Cardiovascular: Regular Rate, Gallops


GI/Abdominal Exam: Normal Bowel Sounds, Soft, Non-Tender


Back Exam: Normal Inspection


Extremities: Pedal Edema


Peripheral Pulses: 2+: Radial (L), Radial (R)


Skin: Warm, Dry


Neurological: No New Focal Deficit


Psy/Mental Status: Alert, Normal Affect, Normal Mood





- Patient Data


Lab Results Last 24 hrs: 


                         Laboratory Results - last 24 hr











  11/09/21 11/09/21 11/09/21 Range/Units





  10:15 10:15 10:15 


 


Hgb   7.4 L   (12.9-17.7)  g/dL


 


Hct   23.5 L   (38.3-50.1)  %


 


PT  13.0 H    (9.0-11.1)  sec


 


INR  1.22    (1.00-1.24)  


 


Sodium     (135-145)  mmol/L


 


Potassium     (3.5-5.3)  mmol/L


 


Chloride     (100-110)  mmol/L


 


Carbon Dioxide     (21-32)  mmol/L


 


BUN     (7-18)  mg/dL


 


Creatinine     (0.70-1.30)  mg/dL


 


Est Cr Clr Drug Dosing     mL/min


 


Estimated GFR (MDRD)     (>60)  


 


BUN/Creatinine Ratio     (9-20)  


 


Glucose     ()  mg/dL


 


Calcium     (8.6-10.2)  mg/dL


 


Blood Type    O POSITIVE  


 


Gel Antibody Screen    Negative  


 


Crossmatch    See Detail  














  11/09/21 11/09/21 11/09/21 Range/Units





  14:10 18:05 22:05 


 


Hgb  7.5 L  7.7 L  6.9 L*  (12.9-17.7)  g/dL


 


Hct  23.2 L  24.1 L  21.7 L*  (38.3-50.1)  %


 


PT     (9.0-11.1)  sec


 


INR     (1.00-1.24)  


 


Sodium     (135-145)  mmol/L


 


Potassium     (3.5-5.3)  mmol/L


 


Chloride     (100-110)  mmol/L


 


Carbon Dioxide     (21-32)  mmol/L


 


BUN     (7-18)  mg/dL


 


Creatinine     (0.70-1.30)  mg/dL


 


Est Cr Clr Drug Dosing     mL/min


 


Estimated GFR (MDRD)     (>60)  


 


BUN/Creatinine Ratio     (9-20)  


 


Glucose     ()  mg/dL


 


Calcium     (8.6-10.2)  mg/dL


 


Blood Type     


 


Gel Antibody Screen     


 


Crossmatch     














  11/10/21 11/10/21 11/10/21 Range/Units





  06:30 06:30 06:30 


 


Hgb   6.8 L*   (12.9-17.7)  g/dL


 


Hct   21.8 L*   (38.3-50.1)  %


 


PT  12.7 H    (9.0-11.1)  sec


 


INR  1.19    (1.00-1.24)  


 


Sodium    137  (135-145)  mmol/L


 


Potassium    3.0 L D  (3.5-5.3)  mmol/L


 


Chloride    99 L  (100-110)  mmol/L


 


Carbon Dioxide    28  (21-32)  mmol/L


 


BUN    56 H  (7-18)  mg/dL


 


Creatinine    1.7 H  (0.70-1.30)  mg/dL


 


Est Cr Clr Drug Dosing    29.62  mL/min


 


Estimated GFR (MDRD)    38 L  (>60)  


 


BUN/Creatinine Ratio    32.9 H  (9-20)  


 


Glucose    94  ()  mg/dL


 


Calcium    8.6  (8.6-10.2)  mg/dL


 


Blood Type     


 


Gel Antibody Screen     


 


Crossmatch     











Result Diagrams: 


                                 11/10/21 06:30





                                 11/10/21 06:30





Sepsis Event Note





- Evaluation


Sepsis Screening Result: No Definite Risk





- Focused Exam


Vital Signs: 


                                   Vital Signs











  Temp Pulse Pulse Resp BP BP Pulse Ox


 


 11/10/21 08:16   76    109/55 L  


 


 11/10/21 08:07        98


 


 11/10/21 08:00  36.9 C   76  16   109/55 L  98


 


 11/10/21 02:30  36.3 C   76  16   111/57 L  98


 


 11/10/21 00:00  36.5 C   74  16   114/56 L  98














- Problem List & Annotations


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


SNOMED Code(s): 48467452, 835357445


   Code(s): I50.40 - UNSP COMBINED SYSTOLIC AND DIASTOLIC (CONGESTIVE) HRT FAIL 

 Status: Chronic   Current Visit: Yes   





(2) Abdominal ascites


SNOMED Code(s): 919945926


   Code(s): R18.8 - OTHER ASCITES   Status: Acute   Current Visit: No   


Qualifiers: 


   Ascites type: other type   Qualified Code(s): R18.8 - Other ascites   





(3) Abrasions of multiple sites


SNOMED Code(s): 484888422, 544140377


   Code(s): T07.XXXA - UNSPECIFIED MULTIPLE INJURIES, INITIAL ENCOUNTER   

Status: Acute   Current Visit: No   





(4) Anemia


SNOMED Code(s): 311222230


   Code(s): D64.9 - ANEMIA, UNSPECIFIED   Status: Chronic   Current Visit: Yes  







(5) CHF exacerbation


SNOMED Code(s): 925308358, 12044999902392


   Code(s): I50.9 - HEART FAILURE, UNSPECIFIED   Status: Acute   Current Visit: 

No   


Qualifiers: 


   Heart failure type: unspecified   Qualified Code(s): I50.9 - Heart failure, 

unspecified   





(6) Decompensated heart failure


SNOMED Code(s): 247942957


   Code(s): I50.9 - HEART FAILURE, UNSPECIFIED   Status: Acute   Current Visit: 

No   





(7) Elevated INR


SNOMED Code(s): 370523662


   Code(s): R79.1 - ABNORMAL COAGULATION PROFILE   Status: Acute   Current 

Visit: No   





(8) Elevated troponin


SNOMED Code(s): 707023094, 644170629, 339768306


   Code(s): R77.8 - OTHER SPECIFIED ABNORMALITIES OF PLASMA PROTEINS   Status: 

Acute   Current Visit: Yes   





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


SNOMED Code(s): 7214608


   Code(s): I25.2 - OLD MYOCARDIAL INFARCTION   Status: Chronic   Current Visit:

No   





(10) Peripheral edema


SNOMED Code(s): 551300273


   Code(s): R60.9 - EDEMA, UNSPECIFIED   Status: Acute   Current Visit: No   





(11) Weakness


SNOMED Code(s): 01248387


   Code(s): R53.1 - WEAKNESS   Status: Acute   Current Visit: No   





(12) CAD (coronary artery disease)


SNOMED Code(s): 30803787


   Code(s): I25.10 - ATHSCL HEART DISEASE OF NATIVE CORONARY ARTERY W/O ANG 

PCTRS   Status: Chronic   Current Visit: No   


Qualifiers: 


   Coronary Disease-Associated Artery/Lesion type: bypass graft   Associated 

angina: without angina 





(13) COPD (chronic obstructive pulmonary disease)


SNOMED Code(s): 32466126


   Code(s): J44.9 - CHRONIC OBSTRUCTIVE PULMONARY DISEASE, UNSPECIFIED   Status:

Chronic   Current Visit: No   


Qualifiers: 


   Chronic bronchitis type: unspecified 





(14) Cardiac pacemaker in situ


SNOMED Code(s): 164249292


   Code(s): Z95.0 - PRESENCE OF CARDIAC PACEMAKER   Status: Chronic   Current V

isit: No   





(15) HTN (hypertension)


SNOMED Code(s): 82144296


   Code(s): I10 - ESSENTIAL (PRIMARY) HYPERTENSION   Status: Chronic   Current 

Visit: No   


Qualifiers: 


   Hypertension type: essential hypertension 





(16) Hyperlipidemia


SNOMED Code(s): 13154569


   Code(s): E78.5 - HYPERLIPIDEMIA, UNSPECIFIED   Status: Chronic   Current 

Visit: Yes   





(17) Chronic kidney disease, stage 3b


SNOMED Code(s): 686316628


   Code(s): N18.32 - CHRONIC KIDNEY DISEASE, STAGE 3B   Status: Chronic   

Current Visit: Yes   





(18) Encounter for palliative care


SNOMED Code(s): 804986845, 190833050


   Code(s): Z51.5 - ENCOUNTER FOR PALLIATIVE CARE   Status: Acute   Current 

Visit: Yes   





(19) LV (left ventricular) mural thrombus


SNOMED Code(s): 933017012256229


   Code(s): I51.3 - INTRACARDIAC THROMBOSIS, NOT ELSEWHERE CLASSIFIED   Status: 

Acute   Current Visit: Yes   





- Problem List Review


Problem List Initiated/Reviewed/Updated: Yes





- My Orders


Last 24 Hours: 


My Active Orders





11/09/21 08:57


Weight Daily [Height and Weight] [RC] 06 





11/09/21 10:15


TYPE AND SCREEN [BBK] Routine 





11/09/21 12:30


Warfarin Sliding Scale [Coumadin Sliding Scale]   1 each PO ASDIRECTED 





11/09/21 13:00


Enoxaparin [Lovenox]   70 mg SUBCUT Q24H 





11/09/21 16:00


Warfarin [Coumadin]   1 mg PO DAILY@1600 





11/10/21 07:27


RED BLOOD CELLS LP [BBK] Routine 


Transfuse Red Blood Cells [COMM] Routine 





11/10/21 07:30


Sodium Chloride 0.9% [Normal Saline] 250 ml IV ASDIRECTED 





11/10/21 10:00


HEMOGLOBIN/HEMATOCRIT,HH [HEME] Q4H 





11/10/21 14:00


HEMOGLOBIN/HEMATOCRIT,HH [HEME] Q4H 





11/10/21 16:00


Warfarin [Coumadin]   2.5 mg PO ONETIME ONE 





11/10/21 18:00


HEMOGLOBIN/HEMATOCRIT,HH [HEME] Q4H 





11/11/21 12:23


INR,PT,PROTHROMBIN TIME [COAG] DAILY 





11/12/21 12:23


INR,PT,PROTHROMBIN TIME [COAG] DAILY 





11/13/21 12:23


INR,PT,PROTHROMBIN TIME [COAG] DAILY 





11/14/21 12:23


INR,PT,PROTHROMBIN TIME [COAG] DAILY 





11/15/21 12:23


INR,PT,PROTHROMBIN TIME [COAG] DAILY 





11/16/21 12:23


INR,PT,PROTHROMBIN TIME [COAG] DAILY 














- Plan


Plan:: 


1.  Elevated INR: INR normalized after receiving 10 units of vitamin K in the 

emergency department.  See disposition for INR plan


2.  Acute exacerbation of congestive heart failure: Patient was being treated at

 home with Bumex.  He been gaining weight steadily and having increasing dyspnea

 on exertion over the last several weeks.  We will continue giving metolazone 

and IV Lasix twice daily, daily weights -Daily weights have so far been 

unreliable due to different scales, patient will be weighed again later today on

 the same scale for reference


3.  Weakness/dyspnea on exertion: PT/OT, treatments as above


4.  Chronic medical problems: Continue home medications as indicated


5.  DVT prophylaxis: SCDs at night, compression hose during the day.  Enoxaparin

 bridge to therapeutic Coumadin


6.  GI prophylaxis: Heart healthy diet, home medicationpantoprazole 40 mg twice

 daily


7.  Disposition: I was able to speak with the patient's cardiologist.  He was 

found to have an LV apical thrombus following placement of watchman device and 

that is why he was restarted on anticoagulation.  Cardiology strongly 

recommended that we continue anticoagulation and try to get his INR greater than

 2.  They would like his INR tightly controlled between 2 and 2.5.  We will 

continue with diuresis and Lovenox while restarting Coumadin.  When patient is 

therapeutic at 2.0 we will consider discharge, likely 2 to 3 days.





8.  Anemia: Patient likely has some residual bleeding after having significantly

 elevated INR at 6.2 as found in the emergency department.  We have restarted 

Lovenox and Coumadin as above.  Patient will receive 1 unit of packed red blood 

cells today after hemoglobin of 6.8.  We will continue to trend 

hemoglobin/hematocrit while trying to achieve therapeutic INR was stable H&H.

## 2021-11-11 RX ADMIN — POTASSIUM CHLORIDE SCH MEQ: 750 TABLET, FILM COATED, EXTENDED RELEASE ORAL at 08:27

## 2021-11-11 RX ADMIN — Medication PRN ML: at 13:27

## 2021-11-11 RX ADMIN — Medication PRN ML: at 08:33

## 2021-11-11 NOTE — PCM.PN
- General Info


Date of Service: 11/11/21


Admission Dx/Problem (Free Text): 


                           Admission Diagnosis/Problem





Admission Diagnosis/Problem      CHF, Congestive heart failure








Subjective Update: 


Patient states that he continues to feel better, no acute complaints.  He did 

state that he had some difficulty breathing overnight but feels better this 

morning.





Functional Status: Reports: Pain Controlled, Tolerating Diet, Ambulating, 

Urinating





- Review of Systems


General: Reports: No Symptoms


HEENT: Reports: No Symptoms


Pulmonary: Reports: No Symptoms


Cardiovascular: Reports: No Symptoms


Gastrointestinal: Reports: No Symptoms


Genitourinary: Reports: No Symptoms


Musculoskeletal: Reports: No Symptoms


Skin: Reports: No Symptoms


Neurological: Reports: No Symptoms


Psychiatric: Reports: No Symptoms





- Patient Data


Vitals - Most Recent: 


                                Last Vital Signs











Temp  36.3 C   11/11/21 05:00


 


Pulse  80   11/11/21 08:27


 


Resp  16   11/11/21 05:00


 


BP  102/52 L  11/11/21 08:27


 


Pulse Ox  95   11/11/21 05:00











Weight - Most Recent: 72.348 kg


I&O - Last 24 Hours: 


                                 Intake & Output











 11/10/21 11/11/21 11/11/21





 22:59 06:59 14:59


 


Intake Total 0 350 


 


Balance 0 350 











Lab Results Last 24 Hours: 


                         Laboratory Results - last 24 hr











  11/09/21 11/10/21 11/11/21 Range/Units





  10:15 14:00 06:15 


 


Hgb   8.4 L   (12.9-17.7)  g/dL


 


Hct   26.5 L   (38.3-50.1)  %


 


PT    14.4 H  (9.0-11.1)  sec


 


INR    1.36 H  (1.00-1.24)  


 


Blood Type  O POSITIVE    


 


Gel Antibody Screen  Negative    


 


Crossmatch  See Detail    











Med Orders - Current: 


                               Current Medications





Acetaminophen (Acetaminophen 500 Mg Tab)  1,000 mg PO Q6H PRN


   PRN Reason: Pain


   Last Admin: 11/10/21 17:10 Dose:  1,000 mg


   Documented by: 


Carvedilol (Carvedilol 6.25 Mg Tab)  6.25 mg PO BID [facility]


   Last Admin: 11/11/21 08:27 Dose:  6.25 mg


   Documented by: 


Enoxaparin Sodium (Enoxaparin 80 Mg/0.8 Ml Syringe)  70 mg SUBCUT Q24H [facility]


   Last Admin: 11/10/21 13:30 Dose:  70 mg


   Documented by: 


Furosemide (Furosemide 40 Mg/4 Ml Vial)  40 mg IVPUSH BIDDIURETIC [facility]


   Last Admin: 11/11/21 08:27 Dose:  40 mg


   Documented by: 


Sodium Chloride (Normal Saline)  250 mls @ 100 mls/hr IV ASDIRECTED [facility]


   Last Admin: 11/10/21 20:10 Dose:  100 mls/hr


   Documented by: 


Melatonin (Melatonin 3 Mg Tab)  9 mg PO BEDTIME [facility]


   Last Admin: 11/10/21 20:37 Dose:  9 mg


   Documented by: 


Metolazone (Metolazone 5 Mg Tab)  5 mg PO DAILY [facility]


   Last Admin: 11/11/21 08:27 Dose:  5 mg


   Documented by: 


Nitroglycerin (Nitroglycerin 0.4 Mg Tab.Sl)  0.4 mg SL Q5M PRN


   PRN Reason: Chest Pain


Pantoprazole Sodium (Pantoprazole 40 Mg Tab.Cr)  40 mg PO BID [facility]


   Last Admin: 11/11/21 08:27 Dose:  40 mg


   Documented by: 


Potassium Chloride (Potassium Chloride 10 Meq Tab.Er)  30 meq PO DAILY [facility]


   Last Admin: 11/11/21 08:27 Dose:  30 meq


   Documented by: 


Senna/Docusate Sodium (Docusate Sodium/Sennosides 50-8.6 Mg Tab)  2 tab PO DAILY

PRN


   PRN Reason: Constipation


Sodium Chloride (Sodium Chloride 0.9% 10 Ml Syringe)  10 ml FLUSH ASDIRECTED PRN


   PRN Reason: Keep Vein Open


   Last Admin: 11/11/21 08:33 Dose:  10 ml


   Documented by: 


Trazodone HCl (Trazodone 50 Mg Tab)  50 mg PO BEDTIME [facility]


   Last Admin: 11/10/21 20:38 Dose:  50 mg


   Documented by: 


Warfarin Sodium (Warfarin 1 Mg Tab)  1 mg PO DAILY@1600 [facility]


   Last Admin: 11/09/21 16:36 Dose:  1 mg


   Documented by: 


Warfarin Sodium (Warfarin Sliding Scale)  1 each PO ASDIRECTED [facility]





Discontinued Medications





Coenzyme Q10 (Ubidecarenone 100 Mg Cap)  100 mg PO DAILY [facility]


Furosemide (Furosemide 40 Mg/4 Ml Vial)  40 mg IVPUSH NOW ONE


   Stop: 11/08/21 11:34


   Last Admin: 11/08/21 12:17 Dose:  40 mg


   Documented by: 


Phytonadione 10 mg/ Sodium (Chloride)  51 mls @ 100 mls/hr IV NOW ONE


   Stop: 11/08/21 11:58


   Last Admin: 11/08/21 12:17 Dose:  100 mls/hr


   Documented by: 


Melatonin (Melatonin 3 Mg Tab)  3 mg PO BEDTIME [facility]


Metolazone (Metolazone 2.5 Mg Tab)  2.5 mg PO WEEKLY PRN


   PRN Reason: Edema


Potassium Chloride (Potassium Chloride 20 Meq Tab.Er)  40 meq PO ONETIME ONE


   Stop: 11/10/21 07:53


   Last Admin: 11/10/21 08:16 Dose:  40 meq


   Documented by: 


Trazodone HCl (Trazodone 50 Mg Tab)  25 mg PO BEDTIME PRN


   PRN Reason: Insomnia


Warfarin Sodium (Warfarin 2.5 Mg Tab)  2.5 mg PO ONETIME ONE


   Stop: 11/10/21 16:01


   Last Admin: 11/10/21 16:44 Dose:  2.5 mg


   Documented by: 








Comments:: 


Patient sitting in bedside chair eating breakfast








- Exam


Quality Assessment: Supplemental Oxygen, DVT Prophylaxis


General: Alert, Oriented, Cooperative, No Acute Distress


HEENT: EOMI


Neck: Supple


Lungs: Crackles


Cardiovascular: Regular Rate, Murmurs


GI/Abdominal Exam: Normal Bowel Sounds


Back Exam: Normal Inspection


Extremities: Pedal Edema


Peripheral Pulses: 2+: Radial (L), Radial (R)


Skin: Warm, Dry


Neurological: No New Focal Deficit


Psy/Mental Status: Alert, Normal Affect, Normal Mood





- Patient Data


Lab Results Last 24 hrs: 


                         Laboratory Results - last 24 hr











  11/09/21 11/10/21 11/11/21 Range/Units





  10:15 14:00 06:15 


 


Hgb   8.4 L   (12.9-17.7)  g/dL


 


Hct   26.5 L   (38.3-50.1)  %


 


PT    14.4 H  (9.0-11.1)  sec


 


INR    1.36 H  (1.00-1.24)  


 


Blood Type  O POSITIVE    


 


Gel Antibody Screen  Negative    


 


Crossmatch  See Detail    











Result Diagrams: 


                                 11/10/21 14:00





                                 11/10/21 06:30





Sepsis Event Note





- Evaluation


Sepsis Screening Result: No Definite Risk





- Focused Exam


Vital Signs: 


                                   Vital Signs











  Temp Temp Pulse Pulse Pulse Resp BP


 


 11/11/21 08:27    80     102/52 L


 


 11/11/21 05:00   36.3 C   66   16 


 


 11/11/21 00:00  36.3 C  36.3 C   68   16 


 


 11/10/21 23:00  36.6 C     65  18 


 


 11/10/21 21:29    68     116/67














  BP Pulse Ox


 


 11/11/21 08:27  


 


 11/11/21 05:00  114/50 L  95


 


 11/11/21 00:00  111/61  95


 


 11/10/21 23:00  114/58 L  97


 


 11/10/21 21:29  














- Problem List & Annotations


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


SNOMED Code(s): 03101665, 234800360


   Code(s): I50.40 - UNSP COMBINED SYSTOLIC AND DIASTOLIC (CONGESTIVE) HRT FAIL 

 Status: Chronic   Current Visit: Yes   





(2) Abdominal ascites


SNOMED Code(s): 391954186


   Code(s): R18.8 - OTHER ASCITES   Status: Acute   Current Visit: No   


Qualifiers: 


   Ascites type: other type   Qualified Code(s): R18.8 - Other ascites   





(3) Abrasions of multiple sites


SNOMED Code(s): 708480122, 179950598


   Code(s): T07.XXXA - UNSPECIFIED MULTIPLE INJURIES, INITIAL ENCOUNTER   

Status: Acute   Current Visit: No   





(4) Anemia


SNOMED Code(s): 962167518


   Code(s): D64.9 - ANEMIA, UNSPECIFIED   Status: Chronic   Current Visit: Yes  







(5) CHF exacerbation


SNOMED Code(s): 443150514, 22936936078485


   Code(s): I50.9 - HEART FAILURE, UNSPECIFIED   Status: Acute   Current Visit: 

No   


Qualifiers: 


   Heart failure type: unspecified   Qualified Code(s): I50.9 - Heart failure, 

unspecified   





(6) Decompensated heart failure


SNOMED Code(s): 072016050


   Code(s): I50.9 - HEART FAILURE, UNSPECIFIED   Status: Acute   Current Visit: 

No   





(7) Elevated INR


SNOMED Code(s): 207430065


   Code(s): R79.1 - ABNORMAL COAGULATION PROFILE   Status: Acute   Current 

Visit: No   





(8) Elevated troponin


SNOMED Code(s): 149443806, 619768592, 600509131


   Code(s): R77.8 - OTHER SPECIFIED ABNORMALITIES OF PLASMA PROTEINS   Status: 

Acute   Current Visit: Yes   





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


SNOMED Code(s): 3368449


   Code(s): I25.2 - OLD MYOCARDIAL INFARCTION   Status: Chronic   Current Visit:

No   





(10) Peripheral edema


SNOMED Code(s): 547517402


   Code(s): R60.9 - EDEMA, UNSPECIFIED   Status: Acute   Current Visit: No   





(11) Weakness


SNOMED Code(s): 63210518


   Code(s): R53.1 - WEAKNESS   Status: Acute   Current Visit: No   





(12) CAD (coronary artery disease)


SNOMED Code(s): 16953481


   Code(s): I25.10 - ATHSCL HEART DISEASE OF NATIVE CORONARY ARTERY W/O ANG 

PCTRS   Status: Chronic   Current Visit: No   


Qualifiers: 


   Coronary Disease-Associated Artery/Lesion type: bypass graft   Associated 

angina: without angina 





(13) COPD (chronic obstructive pulmonary disease)


SNOMED Code(s): 57270278


   Code(s): J44.9 - CHRONIC OBSTRUCTIVE PULMONARY DISEASE, UNSPECIFIED   Status:

Chronic   Current Visit: No   


Qualifiers: 


   Chronic bronchitis type: unspecified 





(14) Cardiac pacemaker in situ


SNOMED Code(s): 155196680


   Code(s): Z95.0 - PRESENCE OF CARDIAC PACEMAKER   Status: Chronic   Current 

Visit: No   





(15) HTN (hypertension)


SNOMED Code(s): 82910980


   Code(s): I10 - ESSENTIAL (PRIMARY) HYPERTENSION   Status: Chronic   Current 

Visit: No   


Qualifiers: 


   Hypertension type: essential hypertension 





(16) Hyperlipidemia


SNOMED Code(s): 48651990


   Code(s): E78.5 - HYPERLIPIDEMIA, UNSPECIFIED   Status: Chronic   Current 

Visit: Yes   





(17) Chronic kidney disease, stage 3b


SNOMED Code(s): 599959930


   Code(s): N18.32 - CHRONIC KIDNEY DISEASE, STAGE 3B   Status: Chronic   

Current Visit: Yes   





(18) Encounter for palliative care


SNOMED Code(s): 844535242, 118194737


   Code(s): Z51.5 - ENCOUNTER FOR PALLIATIVE CARE   Status: Acute   Current 

Visit: Yes   





(19) LV (left ventricular) mural thrombus


SNOMED Code(s): 824490181284688


   Code(s): I51.3 - INTRACARDIAC THROMBOSIS, NOT ELSEWHERE CLASSIFIED   Status: 

Acute   Current Visit: Yes   





- Problem List Review


Problem List Initiated/Reviewed/Updated: Yes





- My Orders


Last 24 Hours: 


My Active Orders





11/10/21 15:20


Transfuse Red Blood Cells [COMM] Routine 





11/12/21 12:23


INR,PT,PROTHROMBIN TIME [COAG] DAILY 





11/13/21 12:23


INR,PT,PROTHROMBIN TIME [COAG] DAILY 





11/14/21 12:23


INR,PT,PROTHROMBIN TIME [COAG] DAILY 





11/15/21 12:23


INR,PT,PROTHROMBIN TIME [COAG] DAILY 





11/16/21 12:23


INR,PT,PROTHROMBIN TIME [COAG] DAILY 














- Plan


Plan:: 


1.  Elevated INR: INR normalized after receiving 10 units of vitamin K in the 

emergency department.  See disposition for INR plan


2.  Acute exacerbation of congestive heart failure: Patient was being treated at

 home with Bumex.  He been gaining weight steadily and having increasing dyspnea

 on exertion over the last several weeks.  We will continue giving metolazone 

and IV Lasix twice daily, daily weights -Daily weight decreased from yesterday, 

lower extremity edema significantly improved


3.  Weakness/dyspnea on exertion: PT/OT, treatments as above


4.  Chronic medical problems: Continue home medications as indicated


5.  DVT prophylaxis: Enoxaparin bridge to therapeutic Coumadin


6.  GI prophylaxis: Heart healthy diet, home medicationpantoprazole 40 mg twice

 daily


7.  Disposition: I was able to speak with the patient's cardiologist.  He was 

found to have an LV apical thrombus following placement of watchman device and 

that is why he was restarted on anticoagulation.  Cardiology strongly 

recommended that we continue anticoagulation and try to get his INR greater than

 2.  They would like his INR tightly controlled between 2 and 2.5.  We will 

continue with Lovenox while restarting Coumadin.  When patient is therapeutic at

 2.0 we will consider discharge, likely 1 to 2 days.





Note that patient has been on Coumadin for quite some time in the past and 

understands Coumadin use.  Patient will be sent home on Coumadin tightly 

controlled as above.  I spent some time today explaining to the patient how to 

use metolazone with Lasix.  He will need to keep a close eye on his daily 

weights and adjust metolazone and Lasix use to maintain daily weights and 

therefore control CHF.

## 2021-11-12 RX ADMIN — Medication PRN ML: at 13:02

## 2021-11-12 RX ADMIN — Medication PRN ML: at 08:47

## 2021-11-12 RX ADMIN — POTASSIUM CHLORIDE SCH MEQ: 750 TABLET, FILM COATED, EXTENDED RELEASE ORAL at 20:53

## 2021-11-12 RX ADMIN — POTASSIUM CHLORIDE SCH MEQ: 750 TABLET, FILM COATED, EXTENDED RELEASE ORAL at 08:42

## 2021-11-12 NOTE — PCM.PN
- General Info


Date of Service: 11/12/21


Subjective Update: 





He is feeling better, still some fluid he feels in his belly and his legs but 

they are improved. He states he feels best around 150s. He had BM yesterday that

was more tan and not as dark as when he came in. Pharmacy verified what doses he

took when his Coumadin was restarted and they were the correct doses. His wife 

was [facility] to [facility] since he has been hospitalized and he states she is 

doing well. He would like Home Health services on going home and PT/OT 

recommended services at home. 





- Patient Data


Vitals - Most Recent: 


                                Last Vital Signs











Temp  97.4 F   11/12/21 04:00


 


Pulse  70   11/12/21 08:42


 


Resp  18   11/12/21 04:00


 


BP  105/60   11/12/21 08:42


 


Pulse Ox  97   11/12/21 04:00











Weight - Most Recent: 153 lb 2 oz


Lab Results Last 24 Hours: 


                         Laboratory Results - last 24 hr











  11/12/21 11/12/21 11/12/21 Range/Units





  06:20 06:20 06:20 


 


WBC   6.1   (3.2-10.1)  x10-3/uL


 


RBC   3.36 L   (3.90-5.90)  x10(6)uL


 


Hgb   9.3 L   (12.9-17.7)  g/dL


 


Hct   29.2 L   (38.3-50.1)  %


 


MCV   87.0   (80.8-98.7)  fL


 


MCH   27.7   (27.0-33.3)  pg


 


MCHC   31.9   (28.7-35.3)  g/dL


 


RDW   16.8 H   (12.4-15.0)  %


 


Plt Count   183   (117-477)  x10(3)uL


 


MPV   8.5   (6.7-11.0)  fL


 


Neut % (Auto)   73.6 H   (40.3-71.8)  %


 


Lymph % (Auto)   11.4 L   (15.8-45.3)  %


 


Mono % (Auto)   12.4   (5.5-15.2)  %


 


Eos % (Auto)   2.3   (0.1-6.8)  %


 


Baso % (Auto)   0.3   (0.3-3.8)  %


 


Neut # (Auto)   4.5   (1.7-6.9)  x10-3/uL


 


Lymph # (Auto)   0.7   (0.5-4.5)  x10-3/uL


 


Mono # (Auto)   0.8   (0.0-1.2)  x10-3/uL


 


Eos # (Auto)   0.1   (0.0-0.6)  x10-3/uL


 


Baso # (Auto)   0.0   (0.0-0.3)  x10-3/uL


 


PT  16.0 H    (9.0-11.1)  sec


 


INR  1.52 H    (1.00-1.24)  


 


Sodium    136  (135-145)  mmol/L


 


Potassium    3.0 L  (3.5-5.3)  mmol/L


 


Chloride    96 L  (100-110)  mmol/L


 


Carbon Dioxide    33 H  (21-32)  mmol/L


 


BUN    43 H D  (7-18)  mg/dL


 


Creatinine    1.7 H  (0.70-1.30)  mg/dL


 


Est Cr Clr Drug Dosing    29.62  mL/min


 


Estimated GFR (MDRD)    38 L  (>60)  


 


BUN/Creatinine Ratio    25.3 H  (9-20)  


 


Glucose    91  ()  mg/dL


 


Calcium    8.8  (8.6-10.2)  mg/dL











Med Orders - Current: 


                               Current Medications





Acetaminophen (Acetaminophen 500 Mg Tab)  1,000 mg PO Q6H PRN


   PRN Reason: Pain


   Last Admin: 11/10/21 17:10 Dose:  1,000 mg


   Documented by: 


Carvedilol (Carvedilol 6.25 Mg Tab)  6.25 mg PO BID [facility]


   Last Admin: 11/12/21 08:42 Dose:  6.25 mg


   Documented by: 


Enoxaparin Sodium (Enoxaparin 80 Mg/0.8 Ml Syringe)  70 mg SUBCUT Q24H [facility]


   Last Admin: 11/11/21 13:26 Dose:  70 mg


   Documented by: 


Furosemide (Furosemide 40 Mg/4 Ml Vial)  40 mg IVPUSH BIDDIURETIC [facility]


   Last Admin: 11/12/21 08:41 Dose:  40 mg


   Documented by: 


Sodium Chloride (Normal Saline)  250 mls @ 100 mls/hr IV ASDIRECTED [facility]


   Last Admin: 11/10/21 20:10 Dose:  100 mls/hr


   Documented by: 


Melatonin (Melatonin 3 Mg Tab)  9 mg PO BEDTIME [facility]


   Last Admin: 11/11/21 20:47 Dose:  9 mg


   Documented by: 


Metolazone (Metolazone 5 Mg Tab)  5 mg PO DAILY [facility]


   Last Admin: 11/12/21 08:43 Dose:  5 mg


   Documented by: 


Nitroglycerin (Nitroglycerin 0.4 Mg Tab.Sl)  0.4 mg SL Q5M PRN


   PRN Reason: Chest Pain


Pantoprazole Sodium (Pantoprazole 40 Mg Tab.Cr)  40 mg PO BID [facility]


   Last Admin: 11/12/21 08:43 Dose:  40 mg


   Documented by: 


Potassium Chloride (Potassium Chloride 10 Meq Tab.Er)  30 meq PO BID [facility]


   Last Admin: 11/12/21 08:42 Dose:  30 meq


   Documented by: 


Senna/Docusate Sodium (Docusate Sodium/Sennosides 50-8.6 Mg Tab)  2 tab PO DAILY

PRN


   PRN Reason: Constipation


Sodium Chloride (Sodium Chloride 0.9% 10 Ml Syringe)  10 ml FLUSH ASDIRECTED PRN


   PRN Reason: Keep Vein Open


   Last Admin: 11/12/21 08:47 Dose:  10 ml


   Documented by: 


Trazodone HCl (Trazodone 50 Mg Tab)  50 mg PO BEDTIME [facility]


   Last Admin: 11/11/21 20:47 Dose:  50 mg


   Documented by: 


Warfarin Sodium (Warfarin Sliding Scale)  1 each PO ASDIRECTED [facility]


Warfarin Sodium (Warfarin 2.5 Mg Tab)  2.5 mg PO ONETIME ONE


   Stop: 11/12/21 16:01





Discontinued Medications





Coenzyme Q10 (Ubidecarenone 100 Mg Cap)  100 mg PO DAILY [facility]


Furosemide (Furosemide 40 Mg/4 Ml Vial)  40 mg IVPUSH NOW ONE


   Stop: 11/08/21 11:34


   Last Admin: 11/08/21 12:17 Dose:  40 mg


   Documented by: 


Phytonadione 10 mg/ Sodium (Chloride)  51 mls @ 100 mls/hr IV NOW ONE


   Stop: 11/08/21 11:58


   Last Admin: 11/08/21 12:17 Dose:  100 mls/hr


   Documented by: 


Melatonin (Melatonin 3 Mg Tab)  3 mg PO BEDTIME [facility]


Metolazone (Metolazone 2.5 Mg Tab)  2.5 mg PO WEEKLY PRN


   PRN Reason: Edema


Potassium Chloride (Potassium Chloride 10 Meq Tab.Er)  30 meq PO DAILY [facility]


   Last Admin: 11/11/21 08:27 Dose:  30 meq


   Documented by: 


Potassium Chloride (Potassium Chloride 20 Meq Tab.Er)  40 meq PO ONETIME ONE


   Stop: 11/10/21 07:53


   Last Admin: 11/10/21 08:16 Dose:  40 meq


   Documented by: 


Trazodone HCl (Trazodone 50 Mg Tab)  25 mg PO BEDTIME PRN


   PRN Reason: Insomnia


Warfarin Sodium (Warfarin 1 Mg Tab)  1 mg PO DAILY@1600 [facility]


   Last Admin: 11/09/21 16:36 Dose:  1 mg


   Documented by: 


Warfarin Sodium (Warfarin 2.5 Mg Tab)  2.5 mg PO ONETIME ONE


   Stop: 11/10/21 16:01


   Last Admin: 11/10/21 16:44 Dose:  2.5 mg


   Documented by: 


Warfarin Sodium (Warfarin 2.5 Mg Tab)  2.5 mg PO ONETIME ONE


   Stop: 11/11/21 16:01


   Last Admin: 11/11/21 15:32 Dose:  2.5 mg


   Documented by: 











- Exam


Quality Assessment: No: Supplemental Oxygen


General: Alert, Oriented, Cooperative


Lungs: Clear to Auscultation, Normal Respiratory Effort, Decreased Breath Sounds

(LLL), Crackles (Bibasilar).  No: Wheezing


Cardiovascular: Regular Rate, Irregular Rhythm


GI/Abdominal Exam: Normal Bowel Sounds, Soft, Non-Tender, No Distention, Other 

(SQ edema)


 (Male) Exam: Deferred


Extremities: Pedal Edema (L midshin to midfoot: 2+ Right midshin to midfoot: 1+)


Peripheral Pulses: 2+: Radial (L), Radial (R)


Skin: Warm, Dry, Intact





- Patient Data


Lab Results Last 24 hrs: 


                         Laboratory Results - last 24 hr











  11/12/21 11/12/21 11/12/21 Range/Units





  06:20 06:20 06:20 


 


WBC   6.1   (3.2-10.1)  x10-3/uL


 


RBC   3.36 L   (3.90-5.90)  x10(6)uL


 


Hgb   9.3 L   (12.9-17.7)  g/dL


 


Hct   29.2 L   (38.3-50.1)  %


 


MCV   87.0   (80.8-98.7)  fL


 


MCH   27.7   (27.0-33.3)  pg


 


MCHC   31.9   (28.7-35.3)  g/dL


 


RDW   16.8 H   (12.4-15.0)  %


 


Plt Count   183   (117-477)  x10(3)uL


 


MPV   8.5   (6.7-11.0)  fL


 


Neut % (Auto)   73.6 H   (40.3-71.8)  %


 


Lymph % (Auto)   11.4 L   (15.8-45.3)  %


 


Mono % (Auto)   12.4   (5.5-15.2)  %


 


Eos % (Auto)   2.3   (0.1-6.8)  %


 


Baso % (Auto)   0.3   (0.3-3.8)  %


 


Neut # (Auto)   4.5   (1.7-6.9)  x10-3/uL


 


Lymph # (Auto)   0.7   (0.5-4.5)  x10-3/uL


 


Mono # (Auto)   0.8   (0.0-1.2)  x10-3/uL


 


Eos # (Auto)   0.1   (0.0-0.6)  x10-3/uL


 


Baso # (Auto)   0.0   (0.0-0.3)  x10-3/uL


 


PT  16.0 H    (9.0-11.1)  sec


 


INR  1.52 H    (1.00-1.24)  


 


Sodium    136  (135-145)  mmol/L


 


Potassium    3.0 L  (3.5-5.3)  mmol/L


 


Chloride    96 L  (100-110)  mmol/L


 


Carbon Dioxide    33 H  (21-32)  mmol/L


 


BUN    43 H D  (7-18)  mg/dL


 


Creatinine    1.7 H  (0.70-1.30)  mg/dL


 


Est Cr Clr Drug Dosing    29.62  mL/min


 


Estimated GFR (MDRD)    38 L  (>60)  


 


BUN/Creatinine Ratio    25.3 H  (9-20)  


 


Glucose    91  ()  mg/dL


 


Calcium    8.8  (8.6-10.2)  mg/dL











Result Diagrams: 


                                 11/12/21 06:20





                                 11/12/21 06:20





Sepsis Event Note





- Evaluation


Sepsis Screening Result: No Definite Risk





- Focused Exam


Vital Signs: 


                                   Vital Signs











  Temp Temp Pulse Pulse Resp BP BP


 


 11/12/21 08:42    70    105/60 


 


 11/12/21 04:00   97.4 F   68  18   115/64


 


 11/11/21 23:22  97.1 F    71  18   111/61














  Pulse Ox


 


 11/12/21 08:42 


 


 11/12/21 04:00  97


 


 11/11/21 23:22  97














- Problem List & Annotations


(1) LV (left ventricular) mural thrombus


SNOMED Code(s): 726327983296814


   Code(s): I51.3 - INTRACARDIAC THROMBOSIS, NOT ELSEWHERE CLASSIFIED   Status: 

Acute   Current Visit: Yes   





(2) Hypokalemia


SNOMED Code(s): 68881460


   Code(s): E87.6 - HYPOKALEMIA   Status: Acute   Current Visit: No   





(3) Anemia


SNOMED Code(s): 018630332


   Code(s): D64.9 - ANEMIA, UNSPECIFIED   Status: Chronic   Current Visit: Yes  







(4) CHF (congestive heart failure)


SNOMED Code(s): 26234685


   Code(s): I50.9 - HEART FAILURE, UNSPECIFIED   Status: Chronic   Current 

Visit: Yes   


Qualifiers: 


   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 





(5) Weakness


SNOMED Code(s): 54720278


   Code(s): R53.1 - WEAKNESS   Status: Acute   Current Visit: No   





(6) Chronic kidney disease, stage 3b


SNOMED Code(s): 889096073


   Code(s): N18.32 - CHRONIC KIDNEY DISEASE, STAGE 3B   Status: Chronic   

Current Visit: Yes   





(7) Hyperlipidemia


SNOMED Code(s): 80371935


   Code(s): E78.5 - HYPERLIPIDEMIA, UNSPECIFIED   Status: Chronic   Current 

Visit: Yes   





(8) COPD (chronic obstructive pulmonary disease)


SNOMED Code(s): 41792787


   Code(s): J44.9 - CHRONIC OBSTRUCTIVE PULMONARY DISEASE, UNSPECIFIED   Status:

Chronic   Current Visit: No   


Qualifiers: 


   Chronic bronchitis type: unspecified 





(9) Cardiac pacemaker in situ


SNOMED Code(s): 675025575


   Code(s): Z95.0 - PRESENCE OF CARDIAC PACEMAKER   Status: Chronic   Current 

Visit: No   





- Problem List Review


Problem List Initiated/Reviewed/Updated: Yes





- My Orders


Last 24 Hours: 


My Active Orders





11/12/21 09:00


Potassium Chloride [Klor-Con 10]   30 meq PO BID 





11/12/21 16:00


Warfarin [Coumadin]   2.5 mg PO ONETIME ONE 














- Plan


Plan:: 


1. Acute anemia: Hgb 9.3, continue to monitor.


2. Elevated INR: Resolved, INR 1.5 today, continue to bridge with Lovenox 70 mg 

q24h, Pharmacy to adjust dose. 


3. Acute exacerbation of congestive heart failure: metolazone and IV Lasix twice

 daily, daily weights, weight down 6 lbs today. 


4. Weakness: PT/OT on discharge with home health.


5. DVT prophylaxis: Enoxaparin bridge to therapeutic Coumadin


6. 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 

of watchman device and that is why he was restarted on anticoagulation.  

Cardiology strongly recommended that we continue anticoagulation and try to get 

his INR greater than 2.  They would like his INR tightly controlled between 2 

and 2.5.  We will continue with Lovenox while restarting Coumadin.  When patient

 is therapeutic at 2.0 we will consider discharge, likely 1 to 2 days.

## 2021-11-13 RX ADMIN — POTASSIUM CHLORIDE SCH MEQ: 750 TABLET, FILM COATED, EXTENDED RELEASE ORAL at 20:32

## 2021-11-13 RX ADMIN — POTASSIUM CHLORIDE SCH MEQ: 750 TABLET, FILM COATED, EXTENDED RELEASE ORAL at 08:50

## 2021-11-13 NOTE — PCM.PN
- General Info


Date of Service: 11/13/21


Subjective Update: 





Alvin's weight came down to 149.6 lbs today, legs are better but belly still 

feels distended. Has not had bowel movement for 2 days. He states he usually 

takes Colace at home and that works for him. No shortness of breath. 





- Patient Data


Vitals - Most Recent: 


                                Last Vital Signs











Temp  97.7 F   11/13/21 08:00


 


Pulse  64   11/13/21 08:50


 


Resp  16   11/13/21 08:00


 


BP  96/55 L  11/13/21 08:50


 


Pulse Ox  98   11/13/21 08:00











Weight - Most Recent: 149 lb 9.6 oz


I&O - Last 24 Hours: 


                                 Intake & Output











 11/12/21 11/13/21 11/13/21





 22:59 06:59 14:59


 


Intake Total 150  


 


Balance 150  











Lab Results Last 24 Hours: 


                         Laboratory Results - last 24 hr











  11/13/21 11/13/21 11/13/21 Range/Units





  06:12 06:12 06:12 


 


Hgb   10.0 L   (12.9-17.7)  g/dL


 


Hct   31.2 L   (38.3-50.1)  %


 


PT  15.9 H    (9.0-11.1)  sec


 


INR  1.51 H    (1.00-1.24)  


 


Sodium    124 L D  (135-145)  mmol/L


 


Potassium    3.2 L  (3.5-5.3)  mmol/L


 


Chloride    93 L  (100-110)  mmol/L


 


Carbon Dioxide    34 H  (21-32)  mmol/L


 


BUN    45 H  (7-18)  mg/dL


 


Creatinine    1.7 H  (0.70-1.30)  mg/dL


 


Est Cr Clr Drug Dosing    29.38  mL/min


 


Estimated GFR (MDRD)    38 L  (>60)  


 


BUN/Creatinine Ratio    26.5 H  (9-20)  


 


Glucose    90  ()  mg/dL


 


Calcium    9.0  (8.6-10.2)  mg/dL











Med Orders - Current: 


                               Current Medications





Acetaminophen (Acetaminophen 500 Mg Tab)  1,000 mg PO Q6H PRN


   PRN Reason: Pain


   Last Admin: 11/12/21 20:53 Dose:  1,000 mg


   Documented by: 


Carvedilol (Carvedilol 6.25 Mg Tab)  6.25 mg PO BID [facility]


   Last Admin: 11/13/21 08:50 Dose:  6.25 mg


   Documented by: 


Docusate Sodium (Docusate Sodium 100 Mg Cap)  100 mg PO DAILY PRN


   PRN Reason: Constipation


Enoxaparin Sodium (Enoxaparin 80 Mg/0.8 Ml Syringe)  70 mg SUBCUT Q24H [facility]


   Last Admin: 11/12/21 13:00 Dose:  70 mg


   Documented by: 


Furosemide (Furosemide 20 Mg Tab)  20 mg PO BIDDIURETIC [facility]


Sodium Chloride (Normal Saline)  250 mls @ 100 mls/hr IV ASDIRECTED [facility]


   Last Admin: 11/10/21 20:10 Dose:  100 mls/hr


   Documented by: 


Melatonin (Melatonin 3 Mg Tab)  9 mg PO BEDTIME [facility]


   Last Admin: 11/12/21 20:53 Dose:  9 mg


   Documented by: 


Metolazone (Metolazone 5 Mg Tab)  2.5 mg PO DAILY PRN


   PRN Reason: weight gain > 3lbs


Nitroglycerin (Nitroglycerin 0.4 Mg Tab.Sl)  0.4 mg SL Q5M PRN


   PRN Reason: Chest Pain


Pantoprazole Sodium (Pantoprazole 40 Mg Tab.Cr)  40 mg PO BID [facility]


   Last Admin: 11/13/21 08:50 Dose:  40 mg


   Documented by: 


Potassium Chloride (Potassium Chloride 10 Meq Tab.Er)  30 meq PO BID [facility]


   Last Admin: 11/13/21 08:50 Dose:  30 meq


   Documented by: 


Senna/Docusate Sodium (Docusate Sodium/Sennosides 50-8.6 Mg Tab)  2 tab PO DAILY

PRN


   PRN Reason: Constipation


Sodium Chloride (Sodium Chloride 0.9% 10 Ml Syringe)  10 ml FLUSH ASDIRECTED PRN


   PRN Reason: Keep Vein Open


   Last Admin: 11/12/21 13:02 Dose:  10 ml


   Documented by: 


Trazodone HCl (Trazodone 50 Mg Tab)  50 mg PO BEDTIME [facility]


   Last Admin: 11/12/21 20:53 Dose:  50 mg


   Documented by: 


Warfarin Sodium (Warfarin Sliding Scale)  1 each PO ASDIRECTED [facility]


Warfarin Sodium (Warfarin 3 Mg Tab)  3 mg PO 1600 [facility]


   Stop: 11/13/21 16:01





Discontinued Medications





Coenzyme Q10 (Ubidecarenone 100 Mg Cap)  100 mg PO DAILY [facility]


Furosemide (Furosemide 40 Mg/4 Ml Vial)  40 mg IVPUSH NOW ONE


   Stop: 11/08/21 11:34


   Last Admin: 11/08/21 12:17 Dose:  40 mg


   Documented by: 


Furosemide (Furosemide 40 Mg/4 Ml Vial)  40 mg IVPUSH BIDDIURETIC [facility]


   Last Admin: 11/13/21 08:43 Dose:  Not Given


   Documented by: 


Phytonadione 10 mg/ Sodium (Chloride)  51 mls @ 100 mls/hr IV NOW ONE


   Stop: 11/08/21 11:58


   Last Admin: 11/08/21 12:17 Dose:  100 mls/hr


   Documented by: 


Melatonin (Melatonin 3 Mg Tab)  3 mg PO BEDTIME [facility]


Metolazone (Metolazone 5 Mg Tab)  5 mg PO DAILY [facility]


   Last Admin: 11/12/21 08:43 Dose:  5 mg


   Documented by: 


Metolazone (Metolazone 2.5 Mg Tab)  2.5 mg PO WEEKLY PRN


   PRN Reason: Edema


Potassium Chloride (Potassium Chloride 10 Meq Tab.Er)  30 meq PO DAILY [facility]


   Last Admin: 11/11/21 08:27 Dose:  30 meq


   Documented by: 


Potassium Chloride (Potassium Chloride 20 Meq Tab.Er)  40 meq PO ONETIME ONE


   Stop: 11/10/21 07:53


   Last Admin: 11/10/21 08:16 Dose:  40 meq


   Documented by: 


Trazodone HCl (Trazodone 50 Mg Tab)  25 mg PO BEDTIME PRN


   PRN Reason: Insomnia


Warfarin Sodium (Warfarin 1 Mg Tab)  1 mg PO DAILY@1600 [facility]


   Last Admin: 11/09/21 16:36 Dose:  1 mg


   Documented by: 


Warfarin Sodium (Warfarin 2.5 Mg Tab)  2.5 mg PO ONETIME ONE


   Stop: 11/10/21 16:01


   Last Admin: 11/10/21 16:44 Dose:  2.5 mg


   Documented by: 


Warfarin Sodium (Warfarin 2.5 Mg Tab)  2.5 mg PO ONETIME ONE


   Stop: 11/11/21 16:01


   Last Admin: 11/11/21 15:32 Dose:  2.5 mg


   Documented by: 


Warfarin Sodium (Warfarin 2.5 Mg Tab)  2.5 mg PO ONETIME ONE


   Stop: 11/12/21 16:01


   Last Admin: 11/12/21 15:50 Dose:  2.5 mg


   Documented by: 











- Exam


General: Alert, Oriented, Cooperative, No Acute Distress


Lungs: Clear to Auscultation, Normal Respiratory Effort, Decreased Breath Sounds

(LLL).  No: Crackles, Wheezing


Cardiovascular: Regular Rate, Irregular Rhythm


GI/Abdominal Exam: Normal Bowel Sounds, Soft, Non-Tender, Distended, Hernia 

(reducible, umbilical)


Extremities: Pedal Edema (1+ BLE)


Peripheral Pulses: 2+: Radial (L), Radial (R)





- Patient Data


Lab Results Last 24 hrs: 


                         Laboratory Results - last 24 hr











  11/13/21 11/13/21 11/13/21 Range/Units





  06:12 06:12 06:12 


 


Hgb   10.0 L   (12.9-17.7)  g/dL


 


Hct   31.2 L   (38.3-50.1)  %


 


PT  15.9 H    (9.0-11.1)  sec


 


INR  1.51 H    (1.00-1.24)  


 


Sodium    124 L D  (135-145)  mmol/L


 


Potassium    3.2 L  (3.5-5.3)  mmol/L


 


Chloride    93 L  (100-110)  mmol/L


 


Carbon Dioxide    34 H  (21-32)  mmol/L


 


BUN    45 H  (7-18)  mg/dL


 


Creatinine    1.7 H  (0.70-1.30)  mg/dL


 


Est Cr Clr Drug Dosing    29.38  mL/min


 


Estimated GFR (MDRD)    38 L  (>60)  


 


BUN/Creatinine Ratio    26.5 H  (9-20)  


 


Glucose    90  ()  mg/dL


 


Calcium    9.0  (8.6-10.2)  mg/dL











Result Diagrams: 


                                 11/13/21 06:12





                                 11/13/21 06:12





Sepsis Event Note





- Evaluation


Sepsis Screening Result: No Definite Risk





- Focused Exam


Vital Signs: 


                                   Vital Signs











  Temp Temp Pulse Pulse Pulse Resp BP


 


 11/13/21 08:50    64     96/55 L


 


 11/13/21 08:00   97.7 F   64   16 


 


 11/13/21 00:00  97.2 F     78  16 














  BP Pulse Ox


 


 11/13/21 08:50  


 


 11/13/21 08:00  96/55 L  98


 


 11/13/21 00:00  104/62  96














- Problem List & Annotations


(1) LV (left ventricular) mural thrombus


SNOMED Code(s): 897854619687491


   Code(s): I51.3 - INTRACARDIAC THROMBOSIS, NOT ELSEWHERE CLASSIFIED   Status: 

Acute   Current Visit: Yes   





(2) Hypokalemia


SNOMED Code(s): 30752455


   Code(s): E87.6 - HYPOKALEMIA   Status: Acute   Current Visit: No   





(3) Anemia


SNOMED Code(s): 513656595


   Code(s): D64.9 - ANEMIA, UNSPECIFIED   Status: Chronic   Current Visit: Yes  







(4) CHF (congestive heart failure)


SNOMED Code(s): 96448366


   Code(s): I50.9 - HEART FAILURE, UNSPECIFIED   Status: Chronic   Current 

Visit: Yes   


Qualifiers: 


   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 





(5) Weakness


SNOMED Code(s): 12930446


   Code(s): R53.1 - WEAKNESS   Status: Acute   Current Visit: No   





(6) Chronic kidney disease, stage 3b


SNOMED Code(s): 576034908


   Code(s): N18.32 - CHRONIC KIDNEY DISEASE, STAGE 3B   Status: Chronic   

Current Visit: Yes   





(7) Hyperlipidemia


SNOMED Code(s): 71962266


   Code(s): E78.5 - HYPERLIPIDEMIA, UNSPECIFIED   Status: Chronic   Current 

Visit: Yes   





(8) COPD (chronic obstructive pulmonary disease)


SNOMED Code(s): 39151191


   Code(s): J44.9 - CHRONIC OBSTRUCTIVE PULMONARY DISEASE, UNSPECIFIED   Status:

Chronic   Current Visit: No   


Qualifiers: 


   Chronic bronchitis type: unspecified 





(9) Cardiac pacemaker in situ


SNOMED Code(s): 932037285


   Code(s): Z95.0 - PRESENCE OF CARDIAC PACEMAKER   Status: Chronic   Current 

Visit: No   





- Problem List Review


Problem List Initiated/Reviewed/Updated: Yes





- My Orders


Last 24 Hours: 


My Active Orders





11/13/21 08:47


metOLazone [Zaroxolyn]   2.5 mg PO DAILY PRN 





11/13/21 09:59


Docusate Sodium [Colace]   100 mg PO DAILY PRN 





11/13/21 14:00


Furosemide [Lasix]   20 mg PO BIDDIURETIC 





11/14/21 06:00


BASIC METABOLIC PANEL,BMP [CHEM] Routine 














- Plan


Plan:: 


1. Acute anemia: Improved Hgb 10.0, continue to monitor with H&H.


2. Subtherapeutic INR: INR 1.51 today, continue to bridge with Lovenox 70 mg 

q24h, Pharmacy to adjust dose. 


3. Acute exacerbation of congestive heart failure: Change Lasix to 20 mg po bid 

and Metolazone 2.5 mg daily as needed weight gain >3 lbs, daily weights, weight 

down 4 lbs today, 10 lbs since 11/11. Colace as needed constipation. If he's 

still having a lot of abdominal edema will change to Torsemide. 


4. Hyponatremia/hypokalemia: Na dropped to 124 today, decrease diuretics; K 

improved to 3.2, continue KCl 30 mEq bid. Recheck BMP tomorrow.


5. Weakness: PT/OT on discharge with home health.


6. Disposition:  Cardiology strongly recommended that we continue 

anticoagulation and they would like his INR tightly controlled between 2 and 2.5

 due to LV thrombus(he was on Eliquis prior to Watchman device procedure). We 

will continue with Lovenox until INR close to 2.0 with Coumadin.  When patient 

is therapeutic at 2.0 we will consider discharge, likely 1 to 2 days.

## 2021-11-14 RX ADMIN — POTASSIUM CHLORIDE SCH MEQ: 750 TABLET, FILM COATED, EXTENDED RELEASE ORAL at 20:34

## 2021-11-14 RX ADMIN — POTASSIUM CHLORIDE SCH MEQ: 750 TABLET, FILM COATED, EXTENDED RELEASE ORAL at 08:12

## 2021-11-14 NOTE — PCM.PN
- General Info


Date of Service: 11/14/21


Subjective Update: 





Alvin states feeling better today, had worsening shortness of breath 

yesterday, nursing rechecked his weight in afternoon and had gone up 3 lbs so 

metolazone was given. His weight is 150 today. He stated today that [facility] had called him and let him know that they had an adjacent room to his 

wife's open up and wondered if he was interested in taking it. He asked if I 

thought he would benefit from that even if in the short-term; I advised that I 

think it would be benefit to him, he could get stronger, have meals made for 

him, could have Home health PT/OT and nursing doing INR checks. He has been 

hospitalized and ER visits more frequently over the last 6 months, believe he 

would benefit from someone helping care for him and have some respite from being

primary caregiver for his wife. He stated he wanted to talk to Ailyn Vasques, 

Discharge planning about going to [facility] tomorrow. 





- Patient Data


Vitals - Most Recent: 


                                Last Vital Signs











Temp  97.5 F   11/14/21 08:00


 


Pulse  74   11/14/21 08:11


 


Resp  16   11/14/21 08:00


 


BP  105/60   11/14/21 08:11


 


Pulse Ox  97   11/14/21 08:00











Weight - Most Recent: 150 lb


Lab Results Last 24 Hours: 


                         Laboratory Results - last 24 hr











  11/14/21 11/14/21 Range/Units





  06:20 06:20 


 


PT  15.8 H   (9.0-11.1)  sec


 


INR  1.50 H   (1.00-1.24)  


 


Sodium   136  D  (135-145)  mmol/L


 


Potassium   3.5  (3.5-5.3)  mmol/L


 


Chloride   97 L  (100-110)  mmol/L


 


Carbon Dioxide   33 H  (21-32)  mmol/L


 


BUN   50 H  (7-18)  mg/dL


 


Creatinine   1.6 H  (0.70-1.30)  mg/dL


 


Est Cr Clr Drug Dosing   31.30  mL/min


 


Estimated GFR (MDRD)   41 L  (>60)  


 


BUN/Creatinine Ratio   31.3 H  (9-20)  


 


Glucose   91  ()  mg/dL


 


Calcium   8.8  (8.6-10.2)  mg/dL











Med Orders - Current: 


                               Current Medications





Acetaminophen (Acetaminophen 500 Mg Tab)  1,000 mg PO Q6H PRN


   PRN Reason: Pain


   Last Admin: 11/13/21 20:32 Dose:  1,000 mg


   Documented by: 


Carvedilol (Carvedilol 6.25 Mg Tab)  6.25 mg PO BID [facility]


   Last Admin: 11/14/21 08:11 Dose:  6.25 mg


   Documented by: 


Docusate Sodium (Docusate Sodium 100 Mg Cap)  100 mg PO DAILY PRN


   PRN Reason: Constipation


   Last Admin: 11/13/21 11:01 Dose:  100 mg


   Documented by: 


Enoxaparin Sodium (Enoxaparin 80 Mg/0.8 Ml Syringe)  70 mg SUBCUT Q24H [facility]


   Last Admin: 11/14/21 13:22 Dose:  70 mg


   Documented by: 


Furosemide (Furosemide 20 Mg Tab)  20 mg PO BIDDIURETIC [facility]


   Last Admin: 11/14/21 13:22 Dose:  20 mg


   Documented by: 


Hydrocortisone (Hydrocortisone 2.5% Crm 30 Gm Tube)  0 gm TOP BID PRN


   PRN Reason: Itching


   Last Admin: 11/13/21 22:16 Dose:  30 gm


   Documented by: 


Sodium Chloride (Normal Saline)  250 mls @ 100 mls/hr IV ASDIRECTED [facility]


   Last Admin: 11/10/21 20:10 Dose:  100 mls/hr


   Documented by: 


Melatonin (Melatonin 3 Mg Tab)  9 mg PO BEDTIME [facility]


   Last Admin: 11/13/21 20:32 Dose:  9 mg


   Documented by: 


Metolazone (Metolazone 5 Mg Tab)  2.5 mg PO DAILY PRN


   PRN Reason: weight gain > 3lbs


   Last Admin: 11/13/21 17:08 Dose:  2.5 mg


   Documented by: 


Nitroglycerin (Nitroglycerin 0.4 Mg Tab.Sl)  0.4 mg SL Q5M PRN


   PRN Reason: Chest Pain


Pantoprazole Sodium (Pantoprazole 40 Mg Tab.Cr)  40 mg PO BID [facility]


   Last Admin: 11/14/21 08:12 Dose:  40 mg


   Documented by: 


Potassium Chloride (Potassium Chloride 10 Meq Tab.Er)  30 meq PO BID [facility]


   Last Admin: 11/14/21 08:12 Dose:  30 meq


   Documented by: 


Senna/Docusate Sodium (Docusate Sodium/Sennosides 50-8.6 Mg Tab)  2 tab PO DAILY

PRN


   PRN Reason: Constipation


   Last Admin: 11/14/21 07:45 Dose:  2 tab


   Documented by: 


Sodium Chloride (Sodium Chloride 0.9% 10 Ml Syringe)  10 ml FLUSH ASDIRECTED PRN


   PRN Reason: Keep Vein Open


   Last Admin: 11/12/21 13:02 Dose:  10 ml


   Documented by: 


Trazodone HCl (Trazodone 50 Mg Tab)  50 mg PO BEDTIME [facility]


   Last Admin: 11/13/21 20:32 Dose:  50 mg


   Documented by: 


Warfarin Sodium (Warfarin Sliding Scale)  1 each PO ASDIRECTED [facility]


Warfarin Sodium (Warfarin 2 Mg Tab)  4 mg PO ONETIME ONE


   Stop: 11/14/21 16:01





Discontinued Medications





Coenzyme Q10 (Ubidecarenone 100 Mg Cap)  100 mg PO DAILY [facility]


Furosemide (Furosemide 40 Mg/4 Ml Vial)  40 mg IVPUSH NOW ONE


   Stop: 11/08/21 11:34


   Last Admin: 11/08/21 12:17 Dose:  40 mg


   Documented by: 


Furosemide (Furosemide 40 Mg/4 Ml Vial)  40 mg IVPUSH BIDDIURETIC [facility]


   Last Admin: 11/13/21 08:43 Dose:  Not Given


   Documented by: 


Phytonadione 10 mg/ Sodium (Chloride)  51 mls @ 100 mls/hr IV NOW ONE


   Stop: 11/08/21 11:58


   Last Admin: 11/08/21 12:17 Dose:  100 mls/hr


   Documented by: 


Melatonin (Melatonin 3 Mg Tab)  3 mg PO BEDTIME [facility]


Metolazone (Metolazone 5 Mg Tab)  5 mg PO DAILY [facility]


   Last Admin: 11/12/21 08:43 Dose:  5 mg


   Documented by: 


Metolazone (Metolazone 2.5 Mg Tab)  2.5 mg PO WEEKLY PRN


   PRN Reason: Edema


Potassium Chloride (Potassium Chloride 10 Meq Tab.Er)  30 meq PO DAILY [facility]


   Last Admin: 11/11/21 08:27 Dose:  30 meq


   Documented by: 


Potassium Chloride (Potassium Chloride 20 Meq Tab.Er)  40 meq PO ONETIME ONE


   Stop: 11/10/21 07:53


   Last Admin: 11/10/21 08:16 Dose:  40 meq


   Documented by: 


Trazodone HCl (Trazodone 50 Mg Tab)  25 mg PO BEDTIME PRN


   PRN Reason: Insomnia


Warfarin Sodium (Warfarin 1 Mg Tab)  1 mg PO DAILY@1600 [facility]


   Last Admin: 11/09/21 16:36 Dose:  1 mg


   Documented by: 


Warfarin Sodium (Warfarin 2.5 Mg Tab)  2.5 mg PO ONETIME ONE


   Stop: 11/10/21 16:01


   Last Admin: 11/10/21 16:44 Dose:  2.5 mg


   Documented by: 


Warfarin Sodium (Warfarin 2.5 Mg Tab)  2.5 mg PO ONETIME ONE


   Stop: 11/11/21 16:01


   Last Admin: 11/11/21 15:32 Dose:  2.5 mg


   Documented by: 


Warfarin Sodium (Warfarin 2.5 Mg Tab)  2.5 mg PO ONETIME ONE


   Stop: 11/12/21 16:01


   Last Admin: 11/12/21 15:50 Dose:  2.5 mg


   Documented by: 


Warfarin Sodium (Warfarin 3 Mg Tab)  3 mg PO 1600 [facility]


   Stop: 11/13/21 16:01


   Last Admin: 11/13/21 16:43 Dose:  3 mg


   Documented by: 


Warfarin Sodium (Warfarin 2 Mg Tab)  4 mg PO 1600 [facility]


   Stop: 11/14/21 16:01











- Exam


General: Alert, Oriented, Cooperative, No Acute Distress


Lungs: Clear to Auscultation, Normal Respiratory Effort, Decreased Breath Sounds

(bibasilar).  No: Crackles, Wheezing


Cardiovascular: Regular Rate, Irregular Rhythm


GI/Abdominal Exam: Normal Bowel Sounds, Soft, Non-Tender, Distended (not had BM 

yet), Hernia (reducible)


Extremities: Pedal Edema (1+ BLE, trace in bilateral feet, improved. )


Peripheral Pulses: 2+: Radial (L), Radial (R)





- Patient Data


Lab Results Last 24 hrs: 


                         Laboratory Results - last 24 hr











  11/14/21 11/14/21 Range/Units





  06:20 06:20 


 


PT  15.8 H   (9.0-11.1)  sec


 


INR  1.50 H   (1.00-1.24)  


 


Sodium   136  D  (135-145)  mmol/L


 


Potassium   3.5  (3.5-5.3)  mmol/L


 


Chloride   97 L  (100-110)  mmol/L


 


Carbon Dioxide   33 H  (21-32)  mmol/L


 


BUN   50 H  (7-18)  mg/dL


 


Creatinine   1.6 H  (0.70-1.30)  mg/dL


 


Est Cr Clr Drug Dosing   31.30  mL/min


 


Estimated GFR (MDRD)   41 L  (>60)  


 


BUN/Creatinine Ratio   31.3 H  (9-20)  


 


Glucose   91  ()  mg/dL


 


Calcium   8.8  (8.6-10.2)  mg/dL











Result Diagrams: 


                                 11/13/21 06:12





                                 11/14/21 06:20





Sepsis Event Note





- Evaluation


Sepsis Screening Result: No Definite Risk





- Focused Exam


Vital Signs: 


                                   Vital Signs











  Temp Pulse Pulse Resp BP BP Pulse Ox


 


 11/14/21 08:11   74    105/60  


 


 11/14/21 08:00  97.5 F   74  16   105/60  97














- Problem List & Annotations


(1) LV (left ventricular) mural thrombus


SNOMED Code(s): 005554655257546


   Code(s): I51.3 - INTRACARDIAC THROMBOSIS, NOT ELSEWHERE CLASSIFIED   Status: 

Acute   Current Visit: Yes   





(2) Hypokalemia


SNOMED Code(s): 06033203


   Code(s): E87.6 - HYPOKALEMIA   Status: Resolved   Current Visit: No   





(3) Anemia


SNOMED Code(s): 876132698


   Code(s): D64.9 - ANEMIA, UNSPECIFIED   Status: Chronic   Current Visit: Yes  







(4) CHF (congestive heart failure)


SNOMED Code(s): 81814694


   Code(s): I50.9 - HEART FAILURE, UNSPECIFIED   Status: Chronic   Current 

Visit: Yes   


Qualifiers: 


   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 





(5) Weakness


SNOMED Code(s): 67243020


   Code(s): R53.1 - WEAKNESS   Status: Acute   Current Visit: No   





(6) Chronic kidney disease, stage 3b


SNOMED Code(s): 037916212


   Code(s): N18.32 - CHRONIC KIDNEY DISEASE, STAGE 3B   Status: Chronic   

Current Visit: Yes   





(7) Hyperlipidemia


SNOMED Code(s): 76929296


   Code(s): E78.5 - HYPERLIPIDEMIA, UNSPECIFIED   Status: Chronic   Current 

Visit: Yes   





(8) COPD (chronic obstructive pulmonary disease)


SNOMED Code(s): 64962823


   Code(s): J44.9 - CHRONIC OBSTRUCTIVE PULMONARY DISEASE, UNSPECIFIED   Status:

Chronic   Current Visit: No   


Qualifiers: 


   Chronic bronchitis type: unspecified 





(9) Cardiac pacemaker in situ


SNOMED Code(s): 451143191


   Code(s): Z95.0 - PRESENCE OF CARDIAC PACEMAKER   Status: Chronic   Current 

Visit: No   





- Problem List Review


Problem List Initiated/Reviewed/Updated: Yes





- My Orders


Last 24 Hours: 


My Active Orders





11/13/21 17:22


Hydrocortisone [Hydrocortisone 2.5% Crm]   0 gm TOP BID PRN 





11/14/21 16:00


Warfarin [Coumadin]   4 mg PO ONETIME ONE 





11/15/21 06:00


BASIC METABOLIC PANEL,BMP [CHEM] Routine 


INR,PT,PROTHROMBIN TIME [COAG] Routine 














- Plan


Plan:: 


1. Acute anemia: Hgb 10.0 yesterday, repeat tomorrow.


2. Subtherapeutic INR: INR 1.50 today, continue to bridge with Lovenox 70 mg 

q24h, Pharmacy to adjust dose. 


3. Acute exacerbation of congestive heart failure: Change Lasix to 20 mg po bid 

and Metolazone 2.5 mg daily as needed weight gain >3 lbs, daily weights, weight 

down 2 lbs today, if requiring daily Metolazone with changed from prn to 

scheduled. Colace as needed constipation. 


4. Hyponatremia/hypokalemia: Na 136, K 3.5, continue KCl 30 mEq bid. Recheck BMP

 tomorrow.


5. Weakness: PT/OT on discharge with [facility].


6. Disposition:  Pt contacted by [facility] who are providing respite care for 

his wife, they have an adjacent room available next to her if he would be 

interested to accept it for short term rehab stay. I believe this would be 

beneficial for patient, as he has been requiring more medical care in the past 

few months. Will have [facility] talk with Alvin and [facility] tomorrow. 

His INR has been sitting at 1.5 for past few days, if he did accept bed at [facility] he could have daily INR done there and once gets to 1.9/2.0 then 

discontinue Lovenox and continue to have Coumadin adjusted by Anticoagulation 

clinic.

## 2021-11-15 VITALS — HEART RATE: 60 BPM

## 2021-11-15 VITALS — SYSTOLIC BLOOD PRESSURE: 103 MMHG | DIASTOLIC BLOOD PRESSURE: 48 MMHG

## 2021-11-15 NOTE — [id number]
**Discharge Summary





- Hospital Course


HPI Initial Comments: 





87-year-old gentleman came to the emergency department due to a 2 or more week 

history of increasing shortness of breath/dyspnea on exertion.  His past medical

history is extensive and complicated and involves ischemic cardiomyopathy with 

both systolic and diastolic heart failure.  His last echocardiogram showed EF 

approximately 30% with severe wall motion abnormality.  He has known vegetations

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  Patient also has hypertension, known atrial septal 

defect, paroxysmal atrial fibrillation, history of NSTEMI, chronic kidney 

disease stage IIIb, COPD.  In the emergency department the patient was found to 

have a significantly elevated BNP at greater than 18,000.  Note that BNP has 

been elevated at greater than 19,000 in the past.





Patient is a poor historian but it appears that the patient recently had a 

watchman device placed.  It is possible that the device placed was an atrial 

septal defect closure device.  Patient was restarted on his Coumadin with 

Lovenox bridge secondary finding LV thrombus after watchman device placed.  

However, he was also found to have significantly elevated INR at 6.2 in the 

emergency department.


Diagnosis: Stroke: No





- Discharge Data


Discharge Date: 11/15/21 ([facility])


Discharge Disposition: Home,  Home Health Agency 06


Condition: Good





- Referral to Home Health


Date of Face to Face Encounter: 11/15/21


Reason for Homebound Status: [facility]


Primary Care Physician: 


Duane Strand, MD





Skilled Need: Skilled nursing for assessment/education for CHF, INR regulation &

PT/OT





- Discharge Diagnosis/Problem(s)


(1) LV (left ventricular) mural thrombus


SNOMED Code(s): 485705114243197


   ICD Code: I51.3 - INTRACARDIAC THROMBOSIS, NOT ELSEWHERE CLASSIFIED   Status:

Acute   Current Visit: Yes   





(2) Hypokalemia


SNOMED Code(s): 09305348


   ICD Code: E87.6 - HYPOKALEMIA   Status: Resolved   Current Visit: No   





(3) Anemia


SNOMED Code(s): 889668543


   ICD Code: D64.9 - ANEMIA, UNSPECIFIED   Status: Chronic   Current Visit: Yes 

 





(4) Weakness


SNOMED Code(s): 00079224


   ICD Code: R53.1 - WEAKNESS   Status: Acute   Current Visit: No   





(5) CHF (congestive heart failure)


SNOMED Code(s): 43405057


   ICD Code: I50.9 - HEART FAILURE, UNSPECIFIED   Status: Chronic   Current 

Visit: Yes   


Qualifiers: 


   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 





(6) Chronic kidney disease, stage 3b


SNOMED Code(s): 248720726


   ICD Code: N18.32 - CHRONIC KIDNEY DISEASE, STAGE 3B   Status: Chronic   

Current Visit: Yes   





(7) Hyperlipidemia


SNOMED Code(s): 14983731


   ICD Code: E78.5 - HYPERLIPIDEMIA, UNSPECIFIED   Status: Chronic   Current 

Visit: Yes   





(8) COPD (chronic obstructive pulmonary disease)


SNOMED Code(s): 46539363


   ICD Code: J44.9 - CHRONIC OBSTRUCTIVE PULMONARY DISEASE, UNSPECIFIED   

Status: Chronic   Current Visit: No   


Qualifiers: 


   Chronic bronchitis type: unspecified 





(9) Cardiac pacemaker in situ


SNOMED Code(s): 726779105


   ICD Code: Z95.0 - PRESENCE OF CARDIAC PACEMAKER   Status: Chronic   Current 

Visit: No   





(10) Presence of Watchman left atrial appendage closure device


SNOMED Code(s): 927755326, 533156590


   ICD Code: Z95.818 - PRESENCE OF OTHER CARDIAC IMPLANTS AND GRAFTS   Status: 

Acute   Current Visit: Yes   





- Patient Summary/Data


Consults: 


                                  Consultations





11/08/21 18:16


OT Evaluation and Treatment [CONS] Routine 


   Please Evaluate and Treat.


   OT Reason for Consult: ADL's


   This query below is only for informational purposes and is not editable.


   Admission Diagnosis/Problem: CHF, Congestive heart failure


PT Evaluation and Treatment [CONS] Routine 


   Please Evaluate and Treat.


   PT Reason for Consult: Ambulation


   This query below is only for informational purposes and is not editable.


   Admission Diagnosis/Problem: CHF, Congestive heart failure











Hospital Course: 





Alvin's BNP was 37227 on admission, also had INR 6.2 for LV thrombus found 

after placement of Watchman device. He received 10 mg Vitamin K x 1 in ER. Also 

started on Metolazone 5 mg daily with Lasix 40 mg IV bid for CHF exacerbation. 

INR dropped to 1.19 at lowest. Troponin 107.3, 119.9, asymptomatic; Dr Hernandez 

spoke with Cardiology and they wanted his INR between 2-3, continue Lovenox 

bridge until he is 2.0 or greater. His Hgb in ER was 8.4, trended down to 6.8, 

received 2 units of PRBCs on 11/10, Hgb came up to 8.4, then 9.3, 10.0 and 9.9 

today. INR slowly has come up 1.36, 1.52, 1.51, 1.50, 1.61 for last 5 days, he 

is due to get Warfarin 5 mg today, further adjustment per [facility] 

Anticoagulation clinic. Will have daily INR until he gets to 2.0, then Lovenox 

can be discontinued. In regards to his CHF, his weight trended down from 

163(measured, stated weight on admit was 171), lowest was 149, gained 3 lbs on 

Saturday up to 152, received dose of Metolazone 2.5 on Saturday, weight has been

 stable at 150 for past 2 days. His potassium was 3.0 on admission, came up then

 dropped again to 3.0, KCL 30 mEq increased to bid; K came up 3.8 and decreased 

dose to 20 mEq bid for discharge. His sodium dropped to 124 on 11/13, came back 

up to normal the next day after changing Lasix 40 mg IV bid to 20 mg bid oral 

and changing Metolazone to 2.5 mg daily as needed weight gain >3 lbs. He has 

only received 1 dose of Metolazone since changing it to as needed. His wife was 

admitted to [facility] for respite care while he was hospitalized and they have 

an adjacent room available for him so he will go today for short term rehab stay

 with Home Health services for skilled nursing for assessments, education for 

CHF, INR regulation, PT/OT. PT/OT had recommended outpatient therapy but since 

he will be going to [facility] will have therapy services through Home health. 





- Patient Instructions


Diet: Heart Healthy Diet


Activity: As Tolerated


Driving: Do Not Drive


Showering/Bathing: May Shower


Other/Special Instructions: Follow up with Dr Alicia to recheck BMP in 1 week.  

Daily INR until INR>2.0, discontinue Lovenox once INR > or equal to 2.0.  

Warfarin dosing per anticoagulation clinic at [facility].





- Discharge Plan


*PRESCRIPTION DRUG MONITORING PROGRAM REVIEWED*: Not Applicable


*COPY OF PRESCRIPTION DRUG MONITORING REPORT IN PATIENT BRENDAN: Not Applicable


Prescriptions/Med Rec: 


Potassium Chloride [Klor-Con M20] 20 meq PO BID #60 tab.er


Furosemide [Lasix] 20 mg PO BIDDIURETIC #60 tablet


Enoxaparin [Lovenox] 70 mg SUBCUT Q24H #4 syringe


metOLazone [Zaroxolyn] 2.5 mg PO DAILY PRN #15 tablet


 PRN Reason: weight gain > 3lbs


Home Medications: 


                                    Home Meds





Nitroglycerin [Nitrostat] 0.4 mg SL Q5M PRN 11/27/18 [History]


Pantoprazole Sodium [Protonix] 40 mg PO DAILY 06/21/21 [History]


carvediloL [Coreg] 6.25 mg PO BID 06/21/21 [History]


Acetaminophen [Acetaminophen Extra Strength] 1,000 mg PO Q6H PRN 09/28/21 

[History]


Amoxicillin 2,000 mg PO ASDIRECTED PRN 09/28/21 [History]


Cyanocobalamin (Vitamin B-12) [B-12] 1,000 mcg PO DAILY 09/28/21 [History]


traZODone 50 mg PO BEDTIME 09/28/21 [History]


Docusate Sodium/Sennosides [Senna Plus] 2 tab PO DAILY PRN 11/08/21 [History]


Melatonin 10 mg PO BEDTIME 11/08/21 [History]


Enoxaparin [Lovenox] 70 mg SUBCUT Q24H #4 syringe 11/15/21 [Rx]


Furosemide [Lasix] 20 mg PO BIDDIURETIC #60 tablet 11/15/21 [Rx]


Hydrocortisone [Hydrocortisone 2.5% Crm] 0 gm TOP BID PRN  tube 11/15/21 [Rx]


Potassium Chloride [Klor-Con M20] 20 meq PO BID #60 tab.er 11/15/21 [Rx]


Warfarin [Coumadin] 5 mg PO ONETIME #1 tab 11/15/21 [Rx]


metOLazone [Zaroxolyn] 2.5 mg PO DAILY PRN #15 tablet 11/15/21 [Rx]








Oxygen Therapy Mode: Room Air


Patient Handouts:  Heart Failure, Self-Care, Easy-to-Read, Heart Failure Action 

Plan, Fall Prevention in Hospitals, Adult, Venous Thromboembolism Prevention


Forms:  ED Department Discharge


Referrals: 


Strand,Duane, MD [Primary Care Provider] - 





- Discharge Summary/Plan Comment


DC Time >30 min.: No


Total # of Minutes for Discharge Time: 





20 min





- General Info


Date of Service: 11/15/21


Subjective Update: 





Alvin states he had BM last night, abdomen feels better not so distended. He 

states his breathing is better, his weight is stable at 150. No fevers, chills, 

shortness of breath, ambulated with nursing. Has been on room air over the 

weekend. [facility] is able to accept him today for short term rehab stay. 


Functional Status: Reports: Tolerating Diet, Ambulating





- Patient Data


Vitals - Most Recent: 


                                Last Vital Signs











Temp  97.4 F   11/15/21 00:00


 


Pulse  61   11/15/21 09:25


 


Resp  16   11/15/21 00:00


 


BP  103/48 L  11/15/21 09:25


 


Pulse Ox  96   11/15/21 00:00











Weight - Most Recent: 150 lb 3.2 oz


Lab Results - Last 24 hrs: 


                         Laboratory Results - last 24 hr











  11/15/21 11/15/21 11/15/21 Range/Units





  06:25 06:25 06:25 


 


Hgb    9.9 L  (12.9-17.7)  g/dL


 


Hct    30.9 L  (38.3-50.1)  %


 


PT   16.8 H   (9.0-11.1)  sec


 


INR   1.61 H   (1.00-1.24)  


 


Sodium  135    (135-145)  mmol/L


 


Potassium  3.8    (3.5-5.3)  mmol/L


 


Chloride  98 L    (100-110)  mmol/L


 


Carbon Dioxide  31    (21-32)  mmol/L


 


BUN  48 H    (7-18)  mg/dL


 


Creatinine  1.8 H    (0.70-1.30)  mg/dL


 


Est Cr Clr Drug Dosing  27.86    mL/min


 


Estimated GFR (MDRD)  36 L    (>60)  


 


BUN/Creatinine Ratio  26.7 H    (9-20)  


 


Glucose  90    ()  mg/dL


 


Calcium  9.0    (8.6-10.2)  mg/dL


 


SARS-CoV-2 RNA (MICHAEL)     (NEGATIVE)  














  11/15/21 Range/Units





  09:50 


 


Hgb   (12.9-17.7)  g/dL


 


Hct   (38.3-50.1)  %


 


PT   (9.0-11.1)  sec


 


INR   (1.00-1.24)  


 


Sodium   (135-145)  mmol/L


 


Potassium   (3.5-5.3)  mmol/L


 


Chloride   (100-110)  mmol/L


 


Carbon Dioxide   (21-32)  mmol/L


 


BUN   (7-18)  mg/dL


 


Creatinine   (0.70-1.30)  mg/dL


 


Est Cr Clr Drug Dosing   mL/min


 


Estimated GFR (MDRD)   (>60)  


 


BUN/Creatinine Ratio   (9-20)  


 


Glucose   ()  mg/dL


 


Calcium   (8.6-10.2)  mg/dL


 


SARS-CoV-2 RNA (MICHAEL)  Negative  (NEGATIVE)  











Med Orders - Current: 


                               Current Medications





Acetaminophen (Acetaminophen 500 Mg Tab)  1,000 mg PO Q6H PRN


   PRN Reason: Pain


   Last Admin: 11/15/21 09:33 Dose:  1,000 mg


   Documented by: 


Carvedilol (Carvedilol 6.25 Mg Tab)  6.25 mg PO BID [facility]


   Last Admin: 11/15/21 09:25 Dose:  6.25 mg


   Documented by: 


Docusate Sodium (Docusate Sodium 100 Mg Cap)  100 mg PO DAILY PRN


   PRN Reason: Constipation


   Last Admin: 11/14/21 17:02 Dose:  100 mg


   Documented by: 


Enoxaparin Sodium (Enoxaparin 80 Mg/0.8 Ml Syringe)  70 mg SUBCUT Q24H [facility]


   Last Admin: 11/15/21 10:57 Dose:  70 mg


   Documented by: 


Furosemide (Furosemide 20 Mg Tab)  20 mg PO BIDDIURETIC [facility]


   Last Admin: 11/15/21 09:25 Dose:  20 mg


   Documented by: 


Hydrocortisone (Hydrocortisone 2.5% Crm 30 Gm Tube)  0 gm TOP BID PRN


   PRN Reason: Itching


   Last Admin: 11/15/21 03:25 Dose:  30 gm


   Documented by: 


Melatonin (Melatonin 3 Mg Tab)  9 mg PO BEDTIME [facility]


   Last Admin: 11/14/21 20:34 Dose:  9 mg


   Documented by: 


Metolazone (Metolazone 5 Mg Tab)  2.5 mg PO DAILY PRN


   PRN Reason: weight gain > 3lbs


   Last Admin: 11/13/21 17:08 Dose:  2.5 mg


   Documented by: 


Nitroglycerin (Nitroglycerin 0.4 Mg Tab.Sl)  0.4 mg SL Q5M PRN


   PRN Reason: Chest Pain


Pantoprazole Sodium (Pantoprazole 40 Mg Tab.Cr)  40 mg PO BID [facility]


   Last Admin: 11/15/21 09:26 Dose:  40 mg


   Documented by: 


Potassium Chloride (Potassium Chloride 20 Meq Tab.Er)  20 meq PO BID [facility]


   Last Admin: 11/15/21 09:26 Dose:  20 meq


   Documented by: 


Senna/Docusate Sodium (Docusate Sodium/Sennosides 50-8.6 Mg Tab)  2 tab PO DAILY

PRN


   PRN Reason: Constipation


   Last Admin: 11/14/21 07:45 Dose:  2 tab


   Documented by: 


Trazodone HCl (Trazodone 50 Mg Tab)  50 mg PO BEDTIME [facility]


   Last Admin: 11/14/21 20:33 Dose:  50 mg


   Documented by: 


Warfarin Sodium (Warfarin Sliding Scale)  1 each PO ASDIRECTED [facility]


Warfarin Sodium (Warfarin 5 Mg Tab)  5 mg PO ONETIME ONE


   Stop: 11/15/21 16:01





Discontinued Medications





Coenzyme Q10 (Ubidecarenone 100 Mg Cap)  100 mg PO DAILY [facility]


Enoxaparin Sodium (Enoxaparin 80 Mg/0.8 Ml Syringe)  70 mg SUBCUT Q24H [facility]


   Last Admin: 11/14/21 13:22 Dose:  70 mg


   Documented by: 


Furosemide (Furosemide 40 Mg/4 Ml Vial)  40 mg IVPUSH NOW ONE


   Stop: 11/08/21 11:34


   Last Admin: 11/08/21 12:17 Dose:  40 mg


   Documented by: 


Furosemide (Furosemide 40 Mg/4 Ml Vial)  40 mg IVPUSH BIDDIURETIC [facility]


   Last Admin: 11/13/21 08:43 Dose:  Not Given


   Documented by: 


Phytonadione 10 mg/ Sodium (Chloride)  51 mls @ 100 mls/hr IV NOW ONE


   Stop: 11/08/21 11:58


   Last Admin: 11/08/21 12:17 Dose:  100 mls/hr


   Documented by: 


Sodium Chloride (Normal Saline)  250 mls @ 100 mls/hr IV ASDIRECTED [facility]


   Last Admin: 11/10/21 20:10 Dose:  100 mls/hr


   Documented by: 


Melatonin (Melatonin 3 Mg Tab)  3 mg PO BEDTIME [facility]


Metolazone (Metolazone 5 Mg Tab)  5 mg PO DAILY [facility]


   Last Admin: 11/12/21 08:43 Dose:  5 mg


   Documented by: 


Metolazone (Metolazone 2.5 Mg Tab)  2.5 mg PO WEEKLY PRN


   PRN Reason: Edema


Potassium Chloride (Potassium Chloride 10 Meq Tab.Er)  30 meq PO DAILY [facility]


   Last Admin: 11/11/21 08:27 Dose:  30 meq


   Documented by: 


Potassium Chloride (Potassium Chloride 20 Meq Tab.Er)  40 meq PO ONETIME ONE


   Stop: 11/10/21 07:53


   Last Admin: 11/10/21 08:16 Dose:  40 meq


   Documented by: 


Potassium Chloride (Potassium Chloride 10 Meq Tab.Er)  30 meq PO BID [facility]


   Last Admin: 11/14/21 20:34 Dose:  30 meq


   Documented by: 


Sodium Chloride (Sodium Chloride 0.9% 10 Ml Syringe)  10 ml FLUSH ASDIRECTED PRN


   PRN Reason: Keep Vein Open


   Last Admin: 11/12/21 13:02 Dose:  10 ml


   Documented by: 


Trazodone HCl (Trazodone 50 Mg Tab)  25 mg PO BEDTIME PRN


   PRN Reason: Insomnia


Warfarin Sodium (Warfarin 1 Mg Tab)  1 mg PO DAILY@1600 [facility]


   Last Admin: 11/09/21 16:36 Dose:  1 mg


   Documented by: 


Warfarin Sodium (Warfarin 2.5 Mg Tab)  2.5 mg PO ONETIME ONE


   Stop: 11/10/21 16:01


   Last Admin: 11/10/21 16:44 Dose:  2.5 mg


   Documented by: 


Warfarin Sodium (Warfarin 2.5 Mg Tab)  2.5 mg PO ONETIME ONE


   Stop: 11/11/21 16:01


   Last Admin: 11/11/21 15:32 Dose:  2.5 mg


   Documented by: 


Warfarin Sodium (Warfarin 2.5 Mg Tab)  2.5 mg PO ONETIME ONE


   Stop: 11/12/21 16:01


   Last Admin: 11/12/21 15:50 Dose:  2.5 mg


   Documented by: 


Warfarin Sodium (Warfarin 3 Mg Tab)  3 mg PO 1600 [facility]


   Stop: 11/13/21 16:01


   Last Admin: 11/13/21 16:43 Dose:  3 mg


   Documented by: 


Warfarin Sodium (Warfarin 2 Mg Tab)  4 mg PO 1600 [facility]


   Stop: 11/14/21 16:01


Warfarin Sodium (Warfarin 2 Mg Tab)  4 mg PO ONETIME ONE


   Stop: 11/14/21 16:01


   Last Admin: 11/14/21 15:11 Dose:  4 mg


   Documented by: 











- Exam


General: Reports: Alert, Oriented, Cooperative, No Acute Distress


Lungs: Reports: Clear to Auscultation, Normal Respiratory Effort, Decreased 

Breath Sounds, Crackles (rare fine crackles in left base)


Cardiovascular: Reports: Regular Rate, Irregular Rhythm


GI/Abdominal Exam: Normal Bowel Sounds, Soft, Non-Tender, No Distention, Hernia 

(reducible)


Extremities: Pedal Edema (1+ RLE>2+LLE)


Skin: Reports: Warm, Dry, Intact

## 2022-01-01 ENCOUNTER — HOSPITAL ENCOUNTER (INPATIENT)
Dept: HOSPITAL 7 - FB.ED | Age: 87
LOS: 7 days | Discharge: HOME HEALTH | DRG: 291 | End: 2022-01-08
Attending: FAMILY MEDICINE | Admitting: EMERGENCY MEDICINE
Payer: MEDICARE

## 2022-01-01 DIAGNOSIS — I25.5: ICD-10-CM

## 2022-01-01 DIAGNOSIS — Z87.891: ICD-10-CM

## 2022-01-01 DIAGNOSIS — Z79.899: ICD-10-CM

## 2022-01-01 DIAGNOSIS — E86.0: ICD-10-CM

## 2022-01-01 DIAGNOSIS — Z95.0: ICD-10-CM

## 2022-01-01 DIAGNOSIS — I25.10: ICD-10-CM

## 2022-01-01 DIAGNOSIS — I13.0: ICD-10-CM

## 2022-01-01 DIAGNOSIS — I50.43: ICD-10-CM

## 2022-01-01 DIAGNOSIS — E78.00: ICD-10-CM

## 2022-01-01 DIAGNOSIS — I48.0: ICD-10-CM

## 2022-01-01 DIAGNOSIS — I50.9: Primary | ICD-10-CM

## 2022-01-01 DIAGNOSIS — Z51.5: ICD-10-CM

## 2022-01-01 DIAGNOSIS — D63.1: ICD-10-CM

## 2022-01-01 DIAGNOSIS — Z79.01: ICD-10-CM

## 2022-01-01 DIAGNOSIS — D64.9: ICD-10-CM

## 2022-01-01 DIAGNOSIS — Z86.718: ICD-10-CM

## 2022-01-01 DIAGNOSIS — R77.8: ICD-10-CM

## 2022-01-01 DIAGNOSIS — N17.9: ICD-10-CM

## 2022-01-01 DIAGNOSIS — Z96.643: ICD-10-CM

## 2022-01-01 DIAGNOSIS — Z20.822: ICD-10-CM

## 2022-01-01 DIAGNOSIS — I25.2: ICD-10-CM

## 2022-01-01 DIAGNOSIS — Z88.1: ICD-10-CM

## 2022-01-01 DIAGNOSIS — M19.90: ICD-10-CM

## 2022-01-01 DIAGNOSIS — N18.30: ICD-10-CM

## 2022-01-01 DIAGNOSIS — Z90.49: ICD-10-CM

## 2022-01-01 DIAGNOSIS — J44.9: ICD-10-CM

## 2022-01-01 DIAGNOSIS — Z88.8: ICD-10-CM

## 2022-01-01 DIAGNOSIS — L29.9: ICD-10-CM

## 2022-01-01 DIAGNOSIS — Z95.1: ICD-10-CM

## 2022-01-01 DIAGNOSIS — Z88.0: ICD-10-CM

## 2022-01-01 DIAGNOSIS — H54.7: ICD-10-CM

## 2022-01-01 PROCEDURE — U0002 COVID-19 LAB TEST NON-CDC: HCPCS

## 2022-01-01 RX ADMIN — Medication PRN ML: at 22:35

## 2022-01-01 RX ADMIN — Medication PRN ML: at 22:43

## 2022-01-01 RX ADMIN — Medication PRN ML: at 22:49

## 2022-01-02 RX ADMIN — Medication PRN ML: at 21:52

## 2022-01-02 RX ADMIN — POTASSIUM CHLORIDE SCH MEQ: 1500 TABLET, EXTENDED RELEASE ORAL at 09:06

## 2022-01-02 RX ADMIN — CYANOCOBALAMIN TAB 1000 MCG SCH MCG: 1000 TAB at 09:19

## 2022-01-02 RX ADMIN — Medication PRN ML: at 20:34

## 2022-01-02 RX ADMIN — Medication PRN ML: at 13:40

## 2022-01-02 RX ADMIN — SODIUM CHLORIDE SCH MG: 9 INJECTION, SOLUTION INTRAVENOUS at 06:20

## 2022-01-02 RX ADMIN — SODIUM CHLORIDE SCH MG: 9 INJECTION, SOLUTION INTRAVENOUS at 21:52

## 2022-01-02 RX ADMIN — SODIUM CHLORIDE SCH MG: 9 INJECTION, SOLUTION INTRAVENOUS at 13:39

## 2022-01-02 RX ADMIN — Medication PRN ML: at 09:13

## 2022-01-02 RX ADMIN — POTASSIUM CHLORIDE SCH MEQ: 1500 TABLET, EXTENDED RELEASE ORAL at 20:32

## 2022-01-02 NOTE — PCM.HP.2
H&P History of Present Illness





- General


Date of Service: 01/02/22


Admit Problem/Dx: 


                           Admission Diagnosis/Problem





Admission Diagnosis/Problem      CHF, Congestive heart failure








Source of Information: Patient


History Limitations: Reports: No Limitations





- History of Present Illness


Initial Comments - Free Text/Narative: 


Pranay is an 87-year-old male from [facility]. He presented last night with 

congestive heart failure. His symptoms included dyspnea, even at rest, leg 

swelling, orthopnea lasting 2-3 days. He does have a history of ischemic 

cardiomyopathy, coronary disease and a remote history of COPD.He has CKD,with a 

baseline creatinine of 1.9. He denies fever,chills,cough or chest pain.





- Related Data


Allergies/Adverse Reactions: 


                                    Allergies











Allergy/AdvReac Type Severity Reaction Status Date / Time


 


simvastatin [From Zocor] Allergy Unknown Other Verified 09/28/21 01:43


 


amoxicillin [From Augmentin] Allergy  Abdominal Verified 09/28/21 01:43





   Pain  


 


clavulanic acid Allergy  Abdominal Verified 09/28/21 01:43





[From Augmentin]   Pain  


 


sucralfate [From Carafate] Allergy  Itching Verified 09/28/21 01:43











Home Medications: 


                                    Home Meds





Nitroglycerin [Nitrostat] 0.4 mg SL Q5M PRN 11/27/18 [History]


Pantoprazole Sodium [Protonix] 40 mg PO DAILY 06/21/21 [History]


carvediloL [Coreg] 6.25 mg PO BID 06/21/21 [History]


Acetaminophen [Acetaminophen Extra Strength] 1,000 mg PO Q6H PRN 09/28/21 [H

istory]


Amoxicillin 2,000 mg PO ASDIRECTED PRN 09/28/21 [History]


Cyanocobalamin (Vitamin B-12) [B-12] 1,000 mcg PO DAILY 09/28/21 [History]


traZODone 50 mg PO BEDTIME PRN 09/28/21 [History]


Hydrocortisone [Hydrocortisone 2.5% Crm] 0 gm TOP BID PRN  tube 11/15/21 [Rx]


Potassium Chloride [Klor-Con M20] 20 meq PO BID #60 tab.er 11/15/21 [Rx]


metOLazone [Zaroxolyn] 2.5 mg PO DAILY PRN #15 tablet 11/15/21 [Rx]


Bumetanide [Bumex] 3 mg PO DAILY 01/02/22 [History]


Melatonin 10 mg PO BEDTIME 01/02/22 [History]


Tiotropium [Spiriva] 1 puff IH DAILY 01/02/22 [History]


Warfarin [Coumadin] 2.5 mg PO 1600 01/02/22 [History]











Past Medical History





- Past Health History


Medical/Surgical History: Denies Medical/Surgical History


HEENT History: Reports: Impaired Vision


Other HEENT History: Wears glasses.


Cardiovascular History: Reports: Blood Clots/VTE/DVT, Bypass, CAD, 

Cardiomyopathy, Heart Failure, High Cholesterol, Hypertension, MI, Pacemaker, 

Other (See Below)


Other Cardiovascular History: DVT, Patent foramen ovale (PFO).


Respiratory History: Reports: COPD, SOB


Gastrointestinal History: Reports: Other (See Below)


Other Gastrointestinal History: EGD.  Ulceration, on Carafate, anti-coagulation 

stopped at that time.


Genitourinary History: Reports: Renal Disease, Other (See Below)


Other Genitourinary History: Renal infarct.


OB/GYN History: Reports: None


Musculoskeletal History: Reports: Arthritis, Other (See Below)


Other Musculoskeletal History: Sacroiliitis.


Neurological History: Reports: None


Psychiatric History: Reports: None


Endocrine/Metabolic History: Reports: None


Hematologic History: Reports: None


Immunologic History: Reports: None


Oncologic (Cancer) History: Reports: None


Dermatologic History: Reports: None





- Infectious Disease History


Infectious Disease History: Reports: C-Difficile, Chicken Pox, Measles, Shingles





- Past Surgical History


Head Surgeries/Procedures: Reports: None


HEENT Surgical History: Reports: Adenoidectomy, Cataract Surgery, Tonsillectomy


Cardiovascular Surgical History: Reports: Coronary Artery Bypass, Coronary 

Artery Stent, Other (See Below)


Other Cardiovascular Surgeries/Procedures: One stent.  Triple bypass, Watchman 

Procedure 11/21.


Respiratory Surgical History: Reports: None


GI Surgical History: Reports: Appendectomy, Colonoscopy, EGD, Hernia, Inguinal


Male  Surgical History: Reports: Other (See Below)


Other Male  Surgeries/Procedures: Renal angiogram.


Musculoskeletal Surgical History: Reports: Arthroscopic Knee, Hip Replacement, 

Other (See Below)


Other Musculoskeletal Surgeries/Procedures:: Bilateral hip replacement.





Social & Family History





- Family History


Family Medical History: No Pertinent Family History


GI: Reports: None





- Tobacco Use


Tobacco Use Status *Q: Former Tobacco User


Used Tobacco, but Quit: Yes


Month/Year Tobacco Last Used: 1980





- Caffeine Use


Caffeine Use: Reports: None


Other Caffeine Use: 1-2 cups coffee/day


Caffeine Use Comment: Daily





- Recreational Drug Use


Recreational Drug Use: Yes


Recreational Drug Type: Reports: Other (see below)


Other Recreational Drug Type: medical marijuana





- Living Situation & Occupation


Living situation: Reports: 


Occupation: Retired





H&P Review of Systems





- Review of Systems:


Review Of Systems: Comprehensive ROS is negative, except as noted in HPI.





Exam





- Exam


Exam: See Below





- Vital Signs


Vital Signs: 


                                Last Vital Signs











Temp  97.5 F   01/02/22 01:45


 


Pulse  63   01/02/22 09:06


 


Resp  20   01/02/22 05:00


 


BP  108/72   01/02/22 09:06


 


Pulse Ox  99   01/02/22 05:00











Weight: 76.385 kg





- Exam


Quality Assessment: Supplemental Oxygen


General: Alert, Oriented, Cooperative


HEENT: PERRLA


Neck: Supple


Lungs: Decreased Breath Sounds, Rales


Cardiovascular: Regular Rate


GI/Abdominal Exam: Soft


Back Exam: Normal Inspection


Extremities: Pedal Edema


Skin: Warm


Neurological: Cranial Nerves Intact


Neuro Extensive - Mental Status: Oriented x3


Psychiatric: Alert, Normal Affect





- Patient Data


Lab Results Last 24 hrs: 


                         Laboratory Results - last 24 hr











  01/01/22 01/01/22 01/01/22 Range/Units





  22:30 22:30 22:30 


 


WBC  6.4    (3.2-10.1)  x10-3/uL


 


RBC  3.66 L    (3.90-5.90)  x10(6)uL


 


Hgb  9.3 L    (12.9-17.7)  g/dL


 


Hct  30.2 L    (38.3-50.1)  %


 


MCV  82.6    (80.8-98.7)  fL


 


MCH  25.5 L    (27.0-33.3)  pg


 


MCHC  30.9    (28.7-35.3)  g/dL


 


RDW  18.2 H    (12.4-15.0)  %


 


Plt Count  215    (117-477)  x10(3)uL


 


MPV  8.5    (6.7-11.0)  fL


 


Neut % (Auto)  77.7 H    (40.3-71.8)  %


 


Lymph % (Auto)  11.5 L    (15.8-45.3)  %


 


Mono % (Auto)  9.0    (5.5-15.2)  %


 


Eos % (Auto)  1.5    (0.1-6.8)  %


 


Baso % (Auto)  0.3    (0.3-3.8)  %


 


Neut # (Auto)  5.0    (1.7-6.9)  x10-3/uL


 


Lymph # (Auto)  0.7    (0.5-4.5)  x10-3/uL


 


Mono # (Auto)  0.6    (0.0-1.2)  x10-3/uL


 


Eos # (Auto)  0.1    (0.0-0.6)  x10-3/uL


 


Baso # (Auto)  0.0    (0.0-0.3)  x10-3/uL


 


Add Manual Diff     


 


Neutrophils % (Manual)     (46-82)  %


 


Lymphocytes % (Manual)     (13-37)  %


 


Poikilocytosis     


 


Anisocytosis     


 


PT     (9.0-11.1)  sec


 


INR     (1.00-1.24)  


 


Sodium   138   (135-145)  mmol/L


 


Potassium   4.6   (3.5-5.3)  mmol/L


 


Chloride   102   (100-110)  mmol/L


 


Carbon Dioxide   27   (21-32)  mmol/L


 


BUN   48 H   (7-18)  mg/dL


 


Creatinine   2.1 H*   (0.70-1.30)  mg/dL


 


Est Cr Clr Drug Dosing   TNP   


 


Estimated GFR (MDRD)   30 L   (>60)  


 


BUN/Creatinine Ratio   22.9 H   (9-20)  


 


Glucose   97   ()  mg/dL


 


Calcium   9.2   (8.6-10.2)  mg/dL


 


Total Bilirubin   1.1   (0.1-1.3)  mg/dL


 


AST   25  D   (5-25)  IU/L


 


ALT   27  D   (12-36)  U/L


 


Alkaline Phosphatase   196 H   ()  IU/L


 


Troponin I    117.7 H*  (4.0-60.3)  pg/mL


 


NT-Pro-B Natriuret Pep    03659 H*  (<=450)  pg/mL


 


Total Protein   6.7   (6.0-8.0)  g/dL


 


Albumin   3.2   (3.2-4.6)  g/dL


 


Globulin   3.5   g/dL


 


Albumin/Globulin Ratio   0.9   


 


SARS-CoV-2 RNA (MICHAEL)     (NEGATIVE)  














  01/01/22 01/02/22 01/02/22 Range/Units





  22:30 00:20 06:15 


 


WBC    4.1  (3.2-10.1)  x10-3/uL


 


RBC    3.60 L  (3.90-5.90)  x10(6)uL


 


Hgb    9.3 L  (12.9-17.7)  g/dL


 


Hct    29.6 L  (38.3-50.1)  %


 


MCV    82.2  (80.8-98.7)  fL


 


MCH    25.7 L  (27.0-33.3)  pg


 


MCHC    31.3  (28.7-35.3)  g/dL


 


RDW    18.1 H  (12.4-15.0)  %


 


Plt Count    185  (117-477)  x10(3)uL


 


MPV    8.7  (6.7-11.0)  fL


 


Neut % (Auto)     (40.3-71.8)  %


 


Lymph % (Auto)     (15.8-45.3)  %


 


Mono % (Auto)     (5.5-15.2)  %


 


Eos % (Auto)     (0.1-6.8)  %


 


Baso % (Auto)     (0.3-3.8)  %


 


Neut # (Auto)     (1.7-6.9)  x10-3/uL


 


Lymph # (Auto)     (0.5-4.5)  x10-3/uL


 


Mono # (Auto)     (0.0-1.2)  x10-3/uL


 


Eos # (Auto)     (0.0-0.6)  x10-3/uL


 


Baso # (Auto)     (0.0-0.3)  x10-3/uL


 


Add Manual Diff    Yes  


 


Neutrophils % (Manual)    97 H  (46-82)  %


 


Lymphocytes % (Manual)    3 L  (13-37)  %


 


Poikilocytosis    Few  


 


Anisocytosis    Few  


 


PT  27.6 H    (9.0-11.1)  sec


 


INR  2.73 H    (1.00-1.24)  


 


Sodium     (135-145)  mmol/L


 


Potassium     (3.5-5.3)  mmol/L


 


Chloride     (100-110)  mmol/L


 


Carbon Dioxide     (21-32)  mmol/L


 


BUN     (7-18)  mg/dL


 


Creatinine     (0.70-1.30)  mg/dL


 


Est Cr Clr Drug Dosing     


 


Estimated GFR (MDRD)     (>60)  


 


BUN/Creatinine Ratio     (9-20)  


 


Glucose     ()  mg/dL


 


Calcium     (8.6-10.2)  mg/dL


 


Total Bilirubin     (0.1-1.3)  mg/dL


 


AST     (5-25)  IU/L


 


ALT     (12-36)  U/L


 


Alkaline Phosphatase     ()  IU/L


 


Troponin I     (4.0-60.3)  pg/mL


 


NT-Pro-B Natriuret Pep     (<=450)  pg/mL


 


Total Protein     (6.0-8.0)  g/dL


 


Albumin     (3.2-4.6)  g/dL


 


Globulin     g/dL


 


Albumin/Globulin Ratio     


 


SARS-CoV-2 RNA (MICHAEL)   Negative   (NEGATIVE)  














  01/02/22 01/02/22 01/02/22 Range/Units





  06:15 06:15 06:15 


 


WBC     (3.2-10.1)  x10-3/uL


 


RBC     (3.90-5.90)  x10(6)uL


 


Hgb     (12.9-17.7)  g/dL


 


Hct     (38.3-50.1)  %


 


MCV     (80.8-98.7)  fL


 


MCH     (27.0-33.3)  pg


 


MCHC     (28.7-35.3)  g/dL


 


RDW     (12.4-15.0)  %


 


Plt Count     (117-477)  x10(3)uL


 


MPV     (6.7-11.0)  fL


 


Neut % (Auto)     (40.3-71.8)  %


 


Lymph % (Auto)     (15.8-45.3)  %


 


Mono % (Auto)     (5.5-15.2)  %


 


Eos % (Auto)     (0.1-6.8)  %


 


Baso % (Auto)     (0.3-3.8)  %


 


Neut # (Auto)     (1.7-6.9)  x10-3/uL


 


Lymph # (Auto)     (0.5-4.5)  x10-3/uL


 


Mono # (Auto)     (0.0-1.2)  x10-3/uL


 


Eos # (Auto)     (0.0-0.6)  x10-3/uL


 


Baso # (Auto)     (0.0-0.3)  x10-3/uL


 


Add Manual Diff     


 


Neutrophils % (Manual)     (46-82)  %


 


Lymphocytes % (Manual)     (13-37)  %


 


Poikilocytosis     


 


Anisocytosis     


 


PT    24.3 H  (9.0-11.1)  sec


 


INR    2.38 H  (1.00-1.24)  


 


Sodium  141    (135-145)  mmol/L


 


Potassium  3.8    (3.5-5.3)  mmol/L


 


Chloride  102    (100-110)  mmol/L


 


Carbon Dioxide  28    (21-32)  mmol/L


 


BUN  48 H    (7-18)  mg/dL


 


Creatinine  2.0 H*    (0.70-1.30)  mg/dL


 


Est Cr Clr Drug Dosing  24.33    


 


Estimated GFR (MDRD)  32 L    (>60)  


 


BUN/Creatinine Ratio  24.0 H    (9-20)  


 


Glucose  116    ()  mg/dL


 


Calcium  9.2    (8.6-10.2)  mg/dL


 


Total Bilirubin     (0.1-1.3)  mg/dL


 


AST     (5-25)  IU/L


 


ALT     (12-36)  U/L


 


Alkaline Phosphatase     ()  IU/L


 


Troponin I   94.1 H*   (4.0-60.3)  pg/mL


 


NT-Pro-B Natriuret Pep   62160 H*   (<=450)  pg/mL


 


Total Protein     (6.0-8.0)  g/dL


 


Albumin     (3.2-4.6)  g/dL


 


Globulin     g/dL


 


Albumin/Globulin Ratio     


 


SARS-CoV-2 RNA (MICHAEL)     (NEGATIVE)  











Result Diagrams: 


                                 01/02/22 06:15





                                 01/02/22 06:15





Sepsis Event Note





- Evaluation


Sepsis Screening Result: No Definite Risk





- Focused Exam


Vital Signs: 


                                   Vital Signs











  Temp Temp Pulse Pulse Resp BP BP


 


 01/02/22 09:06    63    108/72 


 


 01/02/22 05:00     65  20   101/56 L


 


 01/02/22 01:45   97.5 F   65  20   94/65


 


 01/01/22 21:45  97.5 F      














  Pulse Ox


 


 01/02/22 09:06 


 


 01/02/22 05:00  99


 


 01/02/22 01:45  97


 


 01/01/22 21:45 














- Problem List


(1) CHF exacerbation


SNOMED Code(s): 797551167, 32467497235700


   ICD Code: I50.9 - HEART FAILURE, UNSPECIFIED   Status: Acute   Current Visit:

 Yes   


Qualifiers: 


   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 

(congestive) heart failure   





(2) Afib


SNOMED Code(s): 94934844


   ICD Code: I48.91 - UNSPECIFIED ATRIAL FIBRILLATION   Status: Acute   Current 

Visit: Yes   


Qualifiers: 


   Atrial fibrillation type: paroxysmal   Qualified Code(s): I48.0 - Paroxysmal 

atrial fibrillation   





(3) Long-term (current) use of anticoagulants, INR goal 2.0-3.0


SNOMED Code(s): 983564513, 613050778


   ICD Code: Z79.01 - LONG TERM (CURRENT) USE OF ANTICOAGULANTS   Status: Acute 

  Current Visit: Yes   





(4) CKD (chronic kidney disease)


SNOMED Code(s): 545734203


   ICD Code: N18.9 - CHRONIC KIDNEY DISEASE, UNSPECIFIED   Status: Chronic   

Current Visit: Yes   


Qualifiers: 


   Chronic kidney disease stage: stage 3 (moderate) 





(5) COPD (chronic obstructive pulmonary disease)


SNOMED Code(s): 06166247


   ICD Code: J44.9 - CHRONIC OBSTRUCTIVE PULMONARY DISEASE, UNSPECIFIED   

Status: Chronic   Current Visit: Yes   


Qualifiers: 


   Chronic bronchitis type: unspecified 





(6) Elevated troponin


SNOMED Code(s): 692785945, 501055145, 931972390


   ICD Code: R77.8 - OTHER SPECIFIED ABNORMALITIES OF PLASMA PROTEINS   Status: 

Acute   Current Visit: No   





(7) Non-STEMI (non-ST elevated myocardial infarction)


SNOMED Code(s): 57695099


   ICD Code: I21.4 - NON-ST ELEVATION (NSTEMI) MYOCARDIAL INFARCTION   Status: 

Acute   Current Visit: No   Problem Details: Mr Manriquez will be transferred to 

[facility] by ground ambulance tonight.    





(8) CAD (coronary artery disease)


SNOMED Code(s): 65999081


   ICD Code: I25.10 - ATHSCL HEART DISEASE OF NATIVE CORONARY ARTERY W/O ANG 

PCTRS   Status: Chronic   Current Visit: No   


Qualifiers: 


   Coronary Disease-Associated Artery/Lesion type: bypass graft   Associated 

angina: without angina 





(9) HTN (hypertension)


SNOMED Code(s): 28042768


   ICD Code: I10 - ESSENTIAL (PRIMARY) HYPERTENSION   Status: Chronic   Current 

Visit: No   


Qualifiers: 


   Hypertension type: primary hypertension   Qualified Code(s): I10 - Essential 

(primary) hypertension   


Problem List Initiated/Reviewed/Updated: Yes


Orders Last 24hrs: 


                               Active Orders 24 hr











 Category Date Time Status


 


 Patient Status [ADT] Routine ADT  01/02/22 00:30 Active


 


 Height and Weight [RC] 06 Care  01/02/22 00:30 Active


 


 Intake and Output [RC] 06,14,22 Care  01/02/22 00:33 Active


 


 Oxygen Therapy [RC] PRN Care  01/02/22 00:30 Active


 


 Pulse Oximetry [RC] CONTINUOUS Care  01/02/22 00:33 Active


 


 RT Aerosol Therapy [RC] ASDIRECTED Care  01/01/22 22:14 Active


 


 RT Aerosol Therapy [RC] ASDIRECTED Care  01/02/22 00:46 Active


 


 Up With Assistance [RC] ASDIRECTED Care  01/02/22 00:30 Active


 


 VTE/DVT Education [RC] Per Unit Routine Care  01/02/22 00:30 Active


 


 Vital Signs [RC] 08,12,16,20,00 Care  01/02/22 00:30 Active


 


 Heart Healthy Diet [DIET] Diet  01/02/22 Breakfast Active


 


 Chest 1V Frontal [CR] Stat Exams  01/01/22 22:13 Taken


 


 BASIC METABOLIC PANEL,BMP [CHEM] AM Lab  01/03/22 05:11 Ordered


 


 CBC WITH AUTO DIFF [HEME] AM Lab  01/03/22 05:11 Ordered


 


 PRO B-TYPE NATRIUR PEPT,BNPPRO [CHEM] DAILY Lab  01/03/22 05:11 Ordered


 


 PRO B-TYPE NATRIUR PEPT,BNPPRO [CHEM] DAILY Lab  01/04/22 05:11 Ordered


 


 PRO B-TYPE NATRIUR PEPT,BNPPRO [CHEM] DAILY Lab  01/05/22 05:11 Ordered


 


 Acetaminophen [Tylenol Extra Strength] Med  01/02/22 00:51 Active





 1,000 mg PO Q6H PRN   


 


 Albuterol/Ipratropium [DuoNeb 3.0-0.5 MG/3 ML] Med  01/02/22 00:30 Active





 3 ml INH Q6H PRN   


 


 Albuterol/Ipratropium [DuoNeb 3.0-0.5 MG/3 ML] Med  01/02/22 04:00 Active





 3 ml NEB Q6H PRN   


 


 Cyanocobalamin (Vitamin B12) [Vitamin B12] Med  01/02/22 09:00 Active





 1,000 mcg PO DAILY   


 


 Docusate Sodium/Sennosides [Senna Plus] Med  01/02/22 00:30 Active





 1 tab PO BID PRN   


 


 Furosemide [Lasix] Med  01/02/22 09:00 Active





 40 mg IVPUSH DAILY   


 


 Melatonin [Melatonin] Med  01/02/22 21:00 Pending





 10 mg PO BEDTIME   


 


 Morphine Med  01/02/22 00:41 Active





 2 mg IVPUSH Q2H PRN   


 


 Nitroglycerin [Nitrostat] Med  01/02/22 00:51 Active





 0.4 mg SL Q5M PRN   


 


 Pantoprazole [ProTONIX***] Med  01/02/22 09:00 Active





 40 mg PO DAILY   


 


 Potassium Chloride [Klor-Con M20] Med  01/02/22 09:00 Active





 20 meq PO BID   


 


 Sodium Chloride 0.9% [Saline Flush] Med  01/01/22 22:13 Active





 10 ml FLUSH ASDIRECTED PRN   


 


 Tiotropium [Spiriva HandiHaler] Med  01/02/22 09:00 Pending





 1 puff IH DAILY   


 


 Warfarin [Coumadin] Med  01/02/22 16:00 Active





 2.5 mg PO 1600   


 


 carvediloL [Coreg] Med  01/02/22 09:00 Active





 6.25 mg PO BID   


 


 metOLazone [Zaroxolyn] Med  01/02/22 08:00 Active





 2.5 mg PO DAILY@0800   


 


 methylPREDNISolone Sod Succ [Solu-MEDROL] Med  01/02/22 06:00 Active





 125 mg IVPUSH Q8H   


 


 traZODone Med  01/02/22 00:51 Active





 50 mg PO BEDTIME PRN   


 


 Saline Lock Insert [OM.PC] Routine Oth  01/01/22 22:13 Ordered


 


 Resuscitation Status Routine Resus Stat  01/02/22 00:30 Ordered


 


 EKG 12 Lead [EK] Routine Ther  01/01/22 22:13 Ordered








                                Medication Orders





Acetaminophen (Acetaminophen 500 Mg Tab)  1,000 mg PO Q6H PRN


   PRN Reason: Pain


Albuterol/Ipratropium (Albuterol/Ipratropium 3.0-0.5 Mg/3 Ml Neb Soln)  3 ml INH

 Q6H PRN


   PRN Reason: Shortness Of Breath/wheezing


Albuterol/Ipratropium (Albuterol/Ipratropium 3.0-0.5 Mg/3 Ml Neb Soln)  3 ml NEB

 Q6H PRN


   PRN Reason: Dyspnea


Carvedilol (Carvedilol 6.25 Mg Tab)  6.25 mg PO BID [facility]


   Last Admin: 01/02/22 09:06  Dose: 6.25 mg


   Documented by: BRITTA


Cyanocobalamin (Cyanocobalamin (Vitamin B12) 1,000 Mcg Tab)  1,000 mcg PO DAILY 

[facility]


Furosemide (Furosemide 40 Mg/4 Ml Vial)  40 mg IVPUSH DAILY [facility]


   Last Admin: 01/02/22 09:07  Dose: 40 mg


   Documented by: BRITTA


Methylprednisolone Sodium Succinate (Methylprednisolone Sodium Succinate 125 

Mg/2 Ml Sdv)  125 mg IVPUSH Q8H [facility]


   Last Admin: 01/02/22 06:20  Dose: 125 mg


   Documented by: ЕЛЕНА


Metolazone (Metolazone 2.5 Mg Tab)  2.5 mg PO DAILY@0800 [facility]


   Last Admin: 01/02/22 09:05  Dose: 2.5 mg


   Documented by: BRITTA


Morphine Sulfate (Morphine 2 Mg/Ml Syringe)  2 mg IVPUSH Q2H PRN


   PRN Reason: Pain


Nitroglycerin (Nitroglycerin 0.4 Mg Tab.Sl)  0.4 mg SL Q5M PRN


   PRN Reason: Chest Pain


Non-Formulary Medication (Melatonin [Melatonin])  10 mg PO BEDTIME [facility]


Non-Formulary Medication (Tiotropium [Spiriva Handihaler])  1 puff IH DAILY [facility]


Pantoprazole Sodium (Pantoprazole 40 Mg Tab.Cr)  40 mg PO DAILY [facility]


   Last Admin: 01/02/22 09:07  Dose: 40 mg


   Documented by: BRITTA


Potassium Chloride (Potassium Chloride 20 Meq Tab.Er)  20 meq PO BID [facility]


   Last Admin: 01/02/22 09:06  Dose: 20 meq


   Documented by: BRITTA


Senna/Docusate Sodium (Docusate Sodium/Sennosides 50-8.6 Mg Tab)  1 tab PO BID 

PRN


   PRN Reason: Constipation


Sodium Chloride (Sodium Chloride 0.9% 10 Ml Syringe)  10 ml FLUSH ASDIRECTED PRN


   PRN Reason: Keep Vein Open


   Last Admin: 01/02/22 09:13  Dose: 10 ml


   Documented by: BRITTA


   Admin: 01/01/22 22:49  Dose: 10 ml


   Documented by: MALAIKA


   Admin: 01/01/22 22:43  Dose: 10 ml


   Documented by: MALAIKA


   Admin: 01/01/22 22:35  Dose: 10 ml


   Documented by: MALAIKA


Trazodone HCl (Trazodone 50 Mg Tab)  50 mg PO BEDTIME PRN


   PRN Reason: Sleep


Warfarin Sodium (Warfarin 2.5 Mg Tab)  2.5 mg PO 1600 [facility]








Assessment/Plan Comment:: 


Admit patient, supply oxygen supplementation by nasal prongs to keep O2 stats at

90-92%. Lasix is appropriate, and so his Metolazone. Monitor creatinine function

and GFR.

## 2022-01-02 NOTE — EDM.PDOC
ED HPI GENERAL MEDICAL PROBLEM





- General


Chief Complaint: Respiratory Problem


Stated Complaint: SOB


Time Seen by Provider: 01/01/22 21:55


Source of Information: Reports: Patient


History Limitations: Reports: No Limitations





- History of Present Illness


INITIAL COMMENTS - FREE TEXT/NARRATIVE: 





Patient is an 88 YO WM from the [facility] who presented to the ED because of 

increasing dyspnea and orthopnea for the  the past week which got worse tonight.

He said he can only  walk to the other room and easily gets winded. He also c/o 

right hip pain,6/10, and worse with movements. Denies any recent fall or injury.

He has a history of CHF and COPD per chart and he took his metolazone without 

significant relief. There is no fever, chills cough/cold. No nausea. vomiting 

diarrhea.





- Related Data


                                    Allergies











Allergy/AdvReac Type Severity Reaction Status Date / Time


 


simvastatin [From Zocor] Allergy Unknown Other Verified 09/28/21 01:43


 


amoxicillin [From Augmentin] Allergy  Abdominal Verified 09/28/21 01:43





   Pain  


 


clavulanic acid Allergy  Abdominal Verified 09/28/21 01:43





[From Augmentin]   Pain  


 


sucralfate [From Carafate] Allergy  Itching Verified 09/28/21 01:43











Home Meds: 


                                    Home Meds





Nitroglycerin [Nitrostat] 0.4 mg SL Q5M PRN 11/27/18 [History]


Pantoprazole Sodium [Protonix] 40 mg PO DAILY 06/21/21 [History]


carvediloL [Coreg] 6.25 mg PO BID 06/21/21 [History]


Acetaminophen [Acetaminophen Extra Strength] 1,000 mg PO Q6H PRN 09/28/21 

[History]


Amoxicillin 2,000 mg PO ASDIRECTED PRN 09/28/21 [History]


Cyanocobalamin (Vitamin B-12) [B-12] 1,000 mcg PO DAILY 09/28/21 [History]


traZODone 50 mg PO BEDTIME PRN 09/28/21 [History]


Hydrocortisone [Hydrocortisone 2.5% Crm] 0 gm TOP BID PRN  tube 11/15/21 [Rx]


Potassium Chloride [Klor-Con M20] 20 meq PO BID #60 tab.er 11/15/21 [Rx]


metOLazone [Zaroxolyn] 2.5 mg PO DAILY PRN #15 tablet 11/15/21 [Rx]


Bumetanide [Bumex] 3 mg PO DAILY 01/02/22 [History]


Melatonin 10 mg PO BEDTIME 01/02/22 [History]


Tiotropium [Spiriva] 1 puff IH DAILY 01/02/22 [History]


Warfarin [Coumadin] 2.5 mg PO 1600 01/02/22 [History]











Past Medical History





- Past Health History


Medical/Surgical History: Denies Medical/Surgical History


HEENT History: Reports: Impaired Vision


Other HEENT History: Wears glasses.


Cardiovascular History: Reports: Blood Clots/VTE/DVT, Bypass, CAD, 

Cardiomyopathy, Heart Failure, High Cholesterol, Hypertension, MI, Pacemaker, 

Other (See Below)


Other Cardiovascular History: DVT, Patent foramen ovale (PFO).


Respiratory History: Reports: COPD, SOB


Gastrointestinal History: Reports: Other (See Below)


Other Gastrointestinal History: EGD.  Ulceration, on Carafate, anti-coagulation 

stopped at that time.


Genitourinary History: Reports: Renal Disease, Other (See Below)


Other Genitourinary History: Renal infarct.


OB/GYN History: Reports: None


Musculoskeletal History: Reports: Arthritis, Other (See Below)


Other Musculoskeletal History: Sacroiliitis.


Neurological History: Reports: None


Psychiatric History: Reports: None


Endocrine/Metabolic History: Reports: None


Hematologic History: Reports: None


Immunologic History: Reports: None


Oncologic (Cancer) History: Reports: None


Dermatologic History: Reports: None





- Infectious Disease History


Infectious Disease History: Reports: C-Difficile, Chicken Pox, Measles, Shingles





- Past Surgical History


Head Surgeries/Procedures: Reports: None


HEENT Surgical History: Reports: Adenoidectomy, Cataract Surgery, Tonsillectomy


Cardiovascular Surgical History: Reports: Coronary Artery Bypass, Coronary 

Artery Stent, Other (See Below)


Other Cardiovascular Surgeries/Procedures: One stent.  Triple bypass, Watchman 

Procedure 11/21.


Respiratory Surgical History: Reports: None


GI Surgical History: Reports: Appendectomy, Colonoscopy, EGD, Hernia, Inguinal


Male  Surgical History: Reports: Other (See Below)


Other Male  Surgeries/Procedures: Renal angiogram.


Musculoskeletal Surgical History: Reports: Arthroscopic Knee, Hip Replacement, 

Other (See Below)


Other Musculoskeletal Surgeries/Procedures:: Bilateral hip replacement.





Social & Family History





- Family History


Family Medical History: No Pertinent Family History


GI: Reports: None





- Tobacco Use


Tobacco Use Status *Q: Former Tobacco User


Used Tobacco, but Quit: Yes


Month/Year Tobacco Last Used: 1980





- Caffeine Use


Caffeine Use: Reports: None


Other Caffeine Use: 1-2 cups coffee/day


Caffeine Use Comment: Daily





- Recreational Drug Use


Recreational Drug Use: Yes


Recreational Drug Type: Reports: Other (see below)


Other Recreational Drug Type: medical marijuana





- Living Situation & Occupation


Living situation: Reports: 


Occupation: Retired





ED ROS GENERAL





- Review of Systems


Review Of Systems: See Below


Constitutional: Reports: No Symptoms


HEENT: Reports: No Symptoms


Respiratory: Reports: Shortness of Breath


Cardiovascular: Reports: No Symptoms


Endocrine: Reports: No Symptoms


GI/Abdominal: Reports: No Symptoms


: Reports: No Symptoms


Musculoskeletal: Reports: Other (right hip pain)


Skin: Reports: No Symptoms


Neurological: Reports: No Symptoms


Psychiatric: Reports: No Symptoms


Hematologic/Lymphatic: Reports: No Symptoms





ED EXAM, GENERAL





- Physical Exam


Exam: See Below


Exam Limited By: No Limitations


General Appearance: Alert, No Apparent Distress


Ears: Normal External Exam, Normal Canal, Normal TMs


Nose: Normal Inspection, Normal Mucosa, No Blood


Throat/Mouth: Normal Inspection, Normal Lips, Normal Teeth, Normal Gums, Normal 

Oropharynx, Normal Voice


Head: Atraumatic, Normocephalic


Neck: Normal Inspection, Supple, Non-Tender, Full Range of Motion


Respiratory/Chest: No Respiratory Distress, Lungs Clear, Normal Breath Sounds, 

No Accessory Muscle Use, Chest Non-Tender


Cardiovascular: Normal Peripheral Pulses, Regular Rate, Rhythm, No Edema, No 

Gallop, No JVD, No Murmur, No Rub


GI/Abdominal: Normal Bowel Sounds, Soft, Non-Tender, No Organomegaly, No 

Distention, No Abnormal Bruit


Back Exam: Normal Inspection, Full Range of Motion


Extremities: Normal Inspection, Normal Range of Motion, Non-Tender, No Pedal 

Edema, Normal Capillary Refill, Pedal Edema, Other (tenderneess right hip)


Neurological: Alert, CN II-XII Intact


Psychiatric: Normal Affect


  ** #1 Interpretation


EKG Date: 01/01/22


Time: 22:08


Rhythm: Other (Ventricular paced rhythm)


Rate (Beats/Min): 60


Axis: Normal


P-Wave: Present


QRS: Normal


ST-T: Normal


QT: Normal


LA/PQ Interval: 67


Comparison: No Change


EKG Interpretation Comments: 





Ventricular paced Rhythm


No acute changes





Course





- Vital Signs


Text/Narrative:: 





Lab/EKG/CXR was reviewed and discussed with patient


Zaroxolyn 2.5 mg PO x1


Lasix 40 mg IV x1


Duoneb x1


Solumedrol 125 mg IV x1


Morphine 2 mg IV x1


Last Recorded V/S: 





                                Last Vital Signs











Temp  36.4 C   01/02/22 01:45


 


Pulse  65   01/02/22 05:00


 


Resp  20   01/02/22 05:00


 


BP  101/56 L  01/02/22 05:00


 


Pulse Ox  99   01/02/22 05:00














- Orders/Labs/Meds


Orders: 





                               Active Orders 24 hr











 Category Date Time Status


 


 RT Aerosol Therapy [RC] ASDIRECTED Care  01/01/22 22:14 Active


 


 Chest 1V Frontal [CR] Stat Exams  01/01/22 22:13 Taken


 


 Sodium Chloride 0.9% [Saline Flush] Med  01/01/22 22:13 Active





 10 ml FLUSH ASDIRECTED PRN   


 


 Saline Lock Insert [OM.PC] Routine Oth  01/01/22 22:13 Ordered


 


 EKG 12 Lead [EK] Routine Ther  01/01/22 22:13 Ordered








                                Medication Orders





Acetaminophen (Acetaminophen 500 Mg Tab)  1,000 mg PO Q6H PRN


   PRN Reason: Pain


Albuterol/Ipratropium (Albuterol/Ipratropium 3.0-0.5 Mg/3 Ml Neb Soln)  3 ml INH

 Q6H PRN


   PRN Reason: Shortness Of Breath/wheezing


Albuterol/Ipratropium (Albuterol/Ipratropium 3.0-0.5 Mg/3 Ml Neb Soln)  3 ml NEB

 Q6H PRN


   PRN Reason: Dyspnea


Carvedilol (Carvedilol 6.25 Mg Tab)  6.25 mg PO BID [facility]


Cyanocobalamin (Cyanocobalamin (Vitamin B12) 1,000 Mcg Tab)  1,000 mcg PO DAILY 

[facility]


Furosemide (Furosemide 40 Mg/4 Ml Vial)  40 mg IVPUSH DAILY [facility]


Methylprednisolone Sodium Succinate (Methylprednisolone Sodium Succinate 125 

Mg/2 Ml Sdv)  125 mg IVPUSH Q8H ANMOL


   Last Admin: 01/02/22 06:20  Dose: 125 mg


   Documented by: ЕЛЕНА


Metolazone (Metolazone 2.5 Mg Tab)  2.5 mg PO DAILY@0800 ANMOL


Morphine Sulfate (Morphine 2 Mg/Ml Syringe)  2 mg IVPUSH Q2H PRN


   PRN Reason: Pain


Nitroglycerin (Nitroglycerin 0.4 Mg Tab.Sl)  0.4 mg SL Q5M PRN


   PRN Reason: Chest Pain


Non-Formulary Medication (Melatonin [Melatonin])  10 mg PO BEDTIME ANMOL


Non-Formulary Medication (Tiotropium [Spiriva Handihaler])  1 puff IH DAILY ANMOL


Pantoprazole Sodium (Pantoprazole 40 Mg Tab.Cr)  40 mg PO DAILY ANMOL


Potassium Chloride (Potassium Chloride 20 Meq Tab.Er)  20 meq PO BID ANMOL


Senna/Docusate Sodium (Docusate Sodium/Sennosides 50-8.6 Mg Tab)  1 tab PO BID 

PRN


   PRN Reason: Constipation


Sodium Chloride (Sodium Chloride 0.9% 10 Ml Syringe)  10 ml FLUSH ASDIRECTED PRN


   PRN Reason: Keep Vein Open


   Last Admin: 01/01/22 22:49  Dose: 10 ml


   Documented by: MALAIKA


   Admin: 01/01/22 22:43  Dose: 10 ml


   Documented by: MALAIKA


   Admin: 01/01/22 22:35  Dose: 10 ml


   Documented by: MALAIKA


Trazodone HCl (Trazodone 50 Mg Tab)  50 mg PO BEDTIME PRN


   PRN Reason: Sleep


Warfarin Sodium (Warfarin 2.5 Mg Tab)  2.5 mg PO 1600 [facility]








Labs: 





                                Laboratory Tests











  01/01/22 01/01/22 01/01/22 Range/Units





  22:30 22:30 22:30 


 


WBC  6.4    (3.2-10.1)  x10-3/uL


 


RBC  3.66 L    (3.90-5.90)  x10(6)uL


 


Hgb  9.3 L    (12.9-17.7)  g/dL


 


Hct  30.2 L    (38.3-50.1)  %


 


MCV  82.6    (80.8-98.7)  fL


 


MCH  25.5 L    (27.0-33.3)  pg


 


MCHC  30.9    (28.7-35.3)  g/dL


 


RDW  18.2 H    (12.4-15.0)  %


 


Plt Count  215    (117-477)  x10(3)uL


 


MPV  8.5    (6.7-11.0)  fL


 


Neut % (Auto)  77.7 H    (40.3-71.8)  %


 


Lymph % (Auto)  11.5 L    (15.8-45.3)  %


 


Mono % (Auto)  9.0    (5.5-15.2)  %


 


Eos % (Auto)  1.5    (0.1-6.8)  %


 


Baso % (Auto)  0.3    (0.3-3.8)  %


 


Neut # (Auto)  5.0    (1.7-6.9)  x10-3/uL


 


Lymph # (Auto)  0.7    (0.5-4.5)  x10-3/uL


 


Mono # (Auto)  0.6    (0.0-1.2)  x10-3/uL


 


Eos # (Auto)  0.1    (0.0-0.6)  x10-3/uL


 


Baso # (Auto)  0.0    (0.0-0.3)  x10-3/uL


 


PT     (9.0-11.1)  sec


 


INR     (1.00-1.24)  


 


Sodium   138   (135-145)  mmol/L


 


Potassium   4.6   (3.5-5.3)  mmol/L


 


Chloride   102   (100-110)  mmol/L


 


Carbon Dioxide   27   (21-32)  mmol/L


 


BUN   48 H   (7-18)  mg/dL


 


Creatinine   2.1 H*   (0.70-1.30)  mg/dL


 


Est Cr Clr Drug Dosing   TNP   


 


Estimated GFR (MDRD)   30 L   (>60)  


 


BUN/Creatinine Ratio   22.9 H   (9-20)  


 


Glucose   97   ()  mg/dL


 


Calcium   9.2   (8.6-10.2)  mg/dL


 


Total Bilirubin   1.1   (0.1-1.3)  mg/dL


 


AST   25  D   (5-25)  IU/L


 


ALT   27  D   (12-36)  U/L


 


Alkaline Phosphatase   196 H   ()  IU/L


 


Troponin I    117.7 H*  (4.0-60.3)  pg/mL


 


NT-Pro-B Natriuret Pep    42670 H*  (<=450)  pg/mL


 


Total Protein   6.7   (6.0-8.0)  g/dL


 


Albumin   3.2   (3.2-4.6)  g/dL


 


Globulin   3.5   g/dL


 


Albumin/Globulin Ratio   0.9   


 


SARS-CoV-2 RNA (MICHAEL)     (NEGATIVE)  














  01/01/22 01/02/22 Range/Units





  22:30 00:20 


 


WBC    (3.2-10.1)  x10-3/uL


 


RBC    (3.90-5.90)  x10(6)uL


 


Hgb    (12.9-17.7)  g/dL


 


Hct    (38.3-50.1)  %


 


MCV    (80.8-98.7)  fL


 


MCH    (27.0-33.3)  pg


 


MCHC    (28.7-35.3)  g/dL


 


RDW    (12.4-15.0)  %


 


Plt Count    (117-477)  x10(3)uL


 


MPV    (6.7-11.0)  fL


 


Neut % (Auto)    (40.3-71.8)  %


 


Lymph % (Auto)    (15.8-45.3)  %


 


Mono % (Auto)    (5.5-15.2)  %


 


Eos % (Auto)    (0.1-6.8)  %


 


Baso % (Auto)    (0.3-3.8)  %


 


Neut # (Auto)    (1.7-6.9)  x10-3/uL


 


Lymph # (Auto)    (0.5-4.5)  x10-3/uL


 


Mono # (Auto)    (0.0-1.2)  x10-3/uL


 


Eos # (Auto)    (0.0-0.6)  x10-3/uL


 


Baso # (Auto)    (0.0-0.3)  x10-3/uL


 


PT  27.6 H   (9.0-11.1)  sec


 


INR  2.73 H   (1.00-1.24)  


 


Sodium    (135-145)  mmol/L


 


Potassium    (3.5-5.3)  mmol/L


 


Chloride    (100-110)  mmol/L


 


Carbon Dioxide    (21-32)  mmol/L


 


BUN    (7-18)  mg/dL


 


Creatinine    (0.70-1.30)  mg/dL


 


Est Cr Clr Drug Dosing    


 


Estimated GFR (MDRD)    (>60)  


 


BUN/Creatinine Ratio    (9-20)  


 


Glucose    ()  mg/dL


 


Calcium    (8.6-10.2)  mg/dL


 


Total Bilirubin    (0.1-1.3)  mg/dL


 


AST    (5-25)  IU/L


 


ALT    (12-36)  U/L


 


Alkaline Phosphatase    ()  IU/L


 


Troponin I    (4.0-60.3)  pg/mL


 


NT-Pro-B Natriuret Pep    (<=450)  pg/mL


 


Total Protein    (6.0-8.0)  g/dL


 


Albumin    (3.2-4.6)  g/dL


 


Globulin    g/dL


 


Albumin/Globulin Ratio    


 


SARS-CoV-2 RNA (MICHAEL)   Negative  (NEGATIVE)  











Meds: 





Medications











Generic Name Dose Route Start Last Admin





  Trade Name Freq  PRN Reason Stop Dose Admin


 


Acetaminophen  1,000 mg  01/02/22 00:51 





  Acetaminophen 500 Mg Tab  PO  





  Q6H PRN  





  Pain  


 


Albuterol/Ipratropium  3 ml  01/02/22 00:30 





  Albuterol/Ipratropium 3.0-0.5 Mg/3 Ml Neb Soln  INH  





  Q6H PRN  





  Shortness Of Breath/wheezing  


 


Albuterol/Ipratropium  3 ml  01/02/22 04:00 





  Albuterol/Ipratropium 3.0-0.5 Mg/3 Ml Neb Soln  NEB  





  Q6H PRN  





  Dyspnea  


 


Carvedilol  6.25 mg  01/02/22 09:00 





  Carvedilol 6.25 Mg Tab  PO  





  BID ANMOL  


 


Cyanocobalamin  1,000 mcg  01/02/22 09:00 





  Cyanocobalamin (Vitamin B12) 1,000 Mcg Tab  PO  





  DAILY [facility]  


 


Furosemide  40 mg  01/02/22 09:00 





  Furosemide 40 Mg/4 Ml Vial  IVPUSH  





  DAILY [facility]  


 


Methylprednisolone Sodium Succinate  125 mg  01/02/22 06:00  01/02/22 06:20





  Methylprednisolone Sodium Succinate 125 Mg/2 Ml Sdv  IVPUSH   125 mg





  Q8H ANMOL   Administration


 


Metolazone  2.5 mg  01/02/22 08:00 





  Metolazone 2.5 Mg Tab  PO  





  DAILY@0800 [facility]  


 


Morphine Sulfate  2 mg  01/02/22 00:41 





  Morphine 2 Mg/Ml Syringe  IVPUSH  





  Q2H PRN  





  Pain  


 


Nitroglycerin  0.4 mg  01/02/22 00:51 





  Nitroglycerin 0.4 Mg Tab.Sl  SL  





  Q5M PRN  





  Chest Pain  


 


Non-Formulary Medication  10 mg  01/02/22 21:00 





  Melatonin [Melatonin]  PO  





  BEDTIME ANMOL  


 


Non-Formulary Medication  1 puff  01/02/22 09:00 





  Tiotropium [Spiriva Handihaler]  IH  





  DAILY [facility]  


 


Pantoprazole Sodium  40 mg  01/02/22 09:00 





  Pantoprazole 40 Mg Tab.Cr  PO  





  DAILY ANMOL  


 


Potassium Chloride  20 meq  01/02/22 09:00 





  Potassium Chloride 20 Meq Tab.Er  PO  





  BID ANMOL  


 


Senna/Docusate Sodium  1 tab  01/02/22 00:30 





  Docusate Sodium/Sennosides 50-8.6 Mg Tab  PO  





  BID PRN  





  Constipation  


 


Sodium Chloride  10 ml  01/01/22 22:13  01/01/22 22:49





  Sodium Chloride 0.9% 10 Ml Syringe  FLUSH   10 ml





  ASDIRECTED PRN   Administration





  Keep Vein Open  


 


Trazodone HCl  50 mg  01/02/22 00:51 





  Trazodone 50 Mg Tab  PO  





  BEDTIME PRN  





  Sleep  


 


Warfarin Sodium  2.5 mg  01/02/22 16:00 





  Warfarin 2.5 Mg Tab  PO  





  1600 ANMOL  














Discontinued Medications














Generic Name Dose Route Start Last Admin





  Trade Name Freq  PRN Reason Stop Dose Admin


 


Albuterol/Ipratropium  3 ml  01/01/22 22:13  01/01/22 22:22





  Albuterol/Ipratropium 3.0-0.5 Mg/3 Ml Neb Soln  NEB  01/01/22 22:14  3 ml





  ONETIME ONE   Administration


 


Furosemide  40 mg  01/01/22 22:13  01/01/22 22:35





  Furosemide 40 Mg/4 Ml Vial  IVPUSH  01/01/22 22:14  40 mg





  NOW ONE   Administration


 


Methylprednisolone Sodium Succinate  125 mg  01/01/22 22:13  01/01/22 22:42





  Methylprednisolone Sodium Succinate 125 Mg/2 Ml Sdv  IVPUSH  01/01/22 22:14  

125 mg





  ONETIME ONE   Administration


 


Metolazone  2.5 mg  01/01/22 22:13  01/01/22 22:22





  Metolazone 2.5 Mg Tab  PO  01/01/22 22:14  2.5 mg





  ONETIME ONE   Administration


 


Morphine Sulfate  2 mg  01/01/22 22:13  01/01/22 22:47





  Morphine 2 Mg/Ml Syringe  IVPUSH  01/01/22 22:14  2 mg





  ONETIME ONE   Administration














Departure





- Departure


Time of Disposition: 00:00


Disposition: Admitted As Inpatient 66


Condition: Good (CHF exacer)


Clinical Impression: 


 Dehydration, CAITY (acute kidney injury), Anemia





CHF exacerbation


Qualifiers:


 Heart failure type: unspecified Qualified Code(s): I50.9 - Heart failure, 

unspecified





COPD (chronic obstructive pulmonary disease)


Qualifiers:


 Chronic bronchitis type: unspecified 





CKD (chronic kidney disease)


Qualifiers:


 Chronic kidney disease stage: stage 3 (moderate) Qualified Code(s): N18.3 - 

Chronic kidney disease, stage 3 (moderate)








- Discharge Information





Sepsis Event Note (ED)





- Evaluation


Sepsis Screening Result: No Definite Risk





- Focused Exam


Vital Signs: 





                                   Vital Signs











  Temp


 


 01/01/22 21:45  36.4 C














- My Orders


Last 24 Hours: 





My Active Orders





01/01/22 22:13


Chest 1V Frontal [CR] Stat 


Sodium Chloride 0.9% [Saline Flush]   10 ml FLUSH ASDIRECTED PRN 


Saline Lock Insert [OM.PC] Routine 


EKG 12 Lead [EK] Routine 





01/01/22 22:14


RT Aerosol Therapy [RC] ASDIRECTED 














- Assessment/Plan


Last 24 Hours: 





My Active Orders





01/01/22 22:13


Chest 1V Frontal [CR] Stat 


Sodium Chloride 0.9% [Saline Flush]   10 ml FLUSH ASDIRECTED PRN 


Saline Lock Insert [OM.PC] Routine 


EKG 12 Lead [EK] Routine 





01/01/22 22:14


RT Aerosol Therapy [RC] ASDIRECTED

## 2022-01-03 RX ADMIN — CYANOCOBALAMIN TAB 1000 MCG SCH MCG: 1000 TAB at 08:53

## 2022-01-03 RX ADMIN — SODIUM CHLORIDE SCH MG: 9 INJECTION, SOLUTION INTRAVENOUS at 05:56

## 2022-01-03 RX ADMIN — POTASSIUM CHLORIDE SCH MEQ: 1500 TABLET, EXTENDED RELEASE ORAL at 21:11

## 2022-01-03 RX ADMIN — Medication PRN ML: at 13:19

## 2022-01-03 RX ADMIN — SODIUM CHLORIDE SCH MG: 9 INJECTION, SOLUTION INTRAVENOUS at 13:19

## 2022-01-03 RX ADMIN — Medication PRN ML: at 21:21

## 2022-01-03 RX ADMIN — SODIUM CHLORIDE SCH MG: 9 INJECTION, SOLUTION INTRAVENOUS at 21:20

## 2022-01-03 RX ADMIN — TIOTROPIUM BROMIDE INHALATION SPRAY SCH GM: 3.12 SPRAY, METERED RESPIRATORY (INHALATION) at 08:53

## 2022-01-03 RX ADMIN — POTASSIUM CHLORIDE SCH MEQ: 1500 TABLET, EXTENDED RELEASE ORAL at 08:53

## 2022-01-03 NOTE — PCM.PN
- General Info


Date of Service: 01/03/22


Subjective Update: 


Recheck was admitted for CHF exacerbation. He's gained 1 pound overnight, and 

still short of breath on mild exertion.





- Review of Systems


General: Reports: Fatigue


HEENT: Reports: No Symptoms


Pulmonary: Reports: Shortness of Breath.  Denies: Wheezing


Cardiovascular: Reports: Edema.  Denies: Chest Pain


Gastrointestinal: Reports: No Symptoms


Genitourinary: Reports: No Symptoms


Musculoskeletal: Reports: Neck Pain, Shoulder Pain





- Patient Data


Vitals - Most Recent: 


                                Last Vital Signs











Temp  98.2 F   01/03/22 00:00


 


Pulse  64   01/03/22 00:00


 


Resp  15   01/03/22 00:00


 


BP  98/52 L  01/03/22 00:00


 


Pulse Ox  97   01/03/22 00:14











Weight - Most Recent: 76.839 kg


I&O - Last 24 Hours: 


                                 Intake & Output











 01/02/22 01/03/22 01/03/22





 22:59 06:59 14:59


 


Intake Total 360  


 


Output Total 300 500 


 


Balance 60 -500 











Lab Results Last 24 Hours: 


                         Laboratory Results - last 24 hr











  01/03/22 01/03/22 01/03/22 Range/Units





  06:20 06:20 06:20 


 


WBC  9.4    (3.2-10.1)  x10-3/uL


 


RBC  3.51 L    (3.90-5.90)  x10(6)uL


 


Hgb  9.3 L    (12.9-17.7)  g/dL


 


Hct  28.6 L    (38.3-50.1)  %


 


MCV  81.6    (80.8-98.7)  fL


 


MCH  26.4 L    (27.0-33.3)  pg


 


MCHC  32.4    (28.7-35.3)  g/dL


 


RDW  18.2 H    (12.4-15.0)  %


 


Plt Count  183    (117-477)  x10(3)uL


 


MPV  8.9    (6.7-11.0)  fL


 


Add Manual Diff  Yes    


 


Neutrophils % (Manual)  87 H    (46-82)  %


 


Band Neutrophils %  3    (0-6)  %


 


Lymphocytes % (Manual)  7 L    (13-37)  %


 


Monocytes % (Manual)  3 L    (4-12)  %


 


Sodium   137   (135-145)  mmol/L


 


Potassium   3.6   (3.5-5.3)  mmol/L


 


Chloride   98 L   (100-110)  mmol/L


 


Carbon Dioxide   27   (21-32)  mmol/L


 


BUN   61 H D   (7-18)  mg/dL


 


Creatinine   2.3 H*   (0.70-1.30)  mg/dL


 


Est Cr Clr Drug Dosing   21.16   mL/min


 


Estimated GFR (MDRD)   27 L   (>60)  


 


BUN/Creatinine Ratio   26.5 H   (9-20)  


 


Glucose   136 H   ()  mg/dL


 


Calcium   9.1   (8.6-10.2)  mg/dL


 


NT-Pro-B Natriuret Pep    80566 H*  (<=450)  pg/mL











Med Orders - Current: 


                               Current Medications





Acetaminophen (Acetaminophen 500 Mg Tab)  1,000 mg PO Q6H PRN


   PRN Reason: Pain


Albuterol/Ipratropium (Albuterol/Ipratropium 3.0-0.5 Mg/3 Ml Neb Soln)  3 ml INH

Q6H PRN


   PRN Reason: Shortness Of Breath/wheezing


Cyanocobalamin (Cyanocobalamin (Vitamin B12) 1,000 Mcg Tab)  1,000 mcg PO DAILY 

[facility]


   Last Admin: 01/03/22 08:53 Dose:  1,000 mcg


   Documented by: 


Furosemide (Furosemide 40 Mg/4 Ml Vial)  40 mg IVPUSH DAILY [facility]


   Last Admin: 01/02/22 09:07 Dose:  40 mg


   Documented by: 


Furosemide (Furosemide 20 Mg/2 Ml Vial)  20 mg IVPUSH DAILY [facility]


Melatonin (Melatonin 3 Mg Tab)  9 mg PO BEDTIME [facility]


   Last Admin: 01/02/22 20:32 Dose:  9 mg


   Documented by: 


Methylprednisolone Sodium Succinate (Methylprednisolone Sodium Succinate 125 

Mg/2 Ml Sdv)  125 mg IVPUSH Q8H [facility]


   Last Admin: 01/03/22 05:56 Dose:  125 mg


   Documented by: 


Metolazone (Metolazone 2.5 Mg Tab)  2.5 mg PO DAILY@0800 [facility]


   Last Admin: 01/03/22 08:52 Dose:  2.5 mg


   Documented by: 


Morphine Sulfate (Morphine 2 Mg/Ml Syringe)  2 mg IVPUSH Q2H PRN


   PRN Reason: Pain


Nitroglycerin (Nitroglycerin 0.4 Mg Tab.Sl)  0.4 mg SL Q5M PRN


   PRN Reason: Chest Pain


Pantoprazole Sodium (Pantoprazole 40 Mg Tab.Cr)  40 mg PO DAILY [facility]


   Last Admin: 01/03/22 08:53 Dose:  40 mg


   Documented by: 


Potassium Chloride (Potassium Chloride 20 Meq Tab.Er)  20 meq PO BID [facility]


   Last Admin: 01/03/22 08:53 Dose:  20 meq


   Documented by: 


Senna/Docusate Sodium (Docusate Sodium/Sennosides 50-8.6 Mg Tab)  1 tab PO BID 

PRN


   PRN Reason: Constipation


Sodium Chloride (Sodium Chloride 0.9% 10 Ml Syringe)  10 ml FLUSH ASDIRECTED PRN


   PRN Reason: Keep Vein Open


   Last Admin: 01/02/22 21:52 Dose:  10 ml


   Documented by: 


Tiotropium Bromide (Tiotropium Bromide 4 Gm Inhalation Spray (2.5mcg/1 Dose; 10 

Doses))  0 gm INH DAILY [facility]


   Last Admin: 01/03/22 08:53 Dose:  4 gm


   Documented by: 


Trazodone HCl (Trazodone 50 Mg Tab)  50 mg PO BEDTIME PRN


   PRN Reason: Sleep


Warfarin Sodium (Warfarin 2.5 Mg Tab)  2.5 mg PO 1600 [facility]


   Last Admin: 01/02/22 16:14 Dose:  2.5 mg


   Documented by: 





Discontinued Medications





Albuterol/Ipratropium (Albuterol/Ipratropium 3.0-0.5 Mg/3 Ml Neb Soln)  3 ml NEB

ONETIME ONE


   Stop: 01/01/22 22:14


   Last Admin: 01/01/22 22:22 Dose:  3 ml


   Documented by: 


Albuterol/Ipratropium (Albuterol/Ipratropium 3.0-0.5 Mg/3 Ml Neb Soln)  3 ml NEB

Q6H PRN


   PRN Reason: Dyspnea


Carvedilol (Carvedilol 6.25 Mg Tab)  6.25 mg PO BID [facility]


   Last Admin: 01/03/22 08:57 Dose:  Not Given


   Documented by: 


Furosemide (Furosemide 40 Mg/4 Ml Vial)  40 mg IVPUSH NOW ONE


   Stop: 01/01/22 22:14


   Last Admin: 01/01/22 22:35 Dose:  40 mg


   Documented by: 


Methylprednisolone Sodium Succinate (Methylprednisolone Sodium Succinate 125 

Mg/2 Ml Sdv)  125 mg IVPUSH ONETIME ONE


   Stop: 01/01/22 22:14


   Last Admin: 01/01/22 22:42 Dose:  125 mg


   Documented by: 


Metolazone (Metolazone 2.5 Mg Tab)  2.5 mg PO ONETIME ONE


   Stop: 01/01/22 22:14


   Last Admin: 01/01/22 22:22 Dose:  2.5 mg


   Documented by: 


Morphine Sulfate (Morphine 2 Mg/Ml Syringe)  2 mg IVPUSH ONETIME ONE


   Stop: 01/01/22 22:14


   Last Admin: 01/01/22 22:47 Dose:  2 mg


   Documented by: 


Non-Formulary Medication (Melatonin [Melatonin])  10 mg PO BEDTIME [facility]











- Exam


Quality Assessment: Supplemental Oxygen


General: Alert, Oriented


HEENT: Pupils Equal


Neck: Supple


Lungs: Crackles


Cardiovascular: Regular Rate


GI/Abdominal Exam: Normal Bowel Sounds


Skin: Warm


Neurological: No New Focal Deficit


Psy/Mental Status: Alert, Normal Affect





- Patient Data


Lab Results Last 24 hrs: 


                         Laboratory Results - last 24 hr











  01/03/22 01/03/22 01/03/22 Range/Units





  06:20 06:20 06:20 


 


WBC  9.4    (3.2-10.1)  x10-3/uL


 


RBC  3.51 L    (3.90-5.90)  x10(6)uL


 


Hgb  9.3 L    (12.9-17.7)  g/dL


 


Hct  28.6 L    (38.3-50.1)  %


 


MCV  81.6    (80.8-98.7)  fL


 


MCH  26.4 L    (27.0-33.3)  pg


 


MCHC  32.4    (28.7-35.3)  g/dL


 


RDW  18.2 H    (12.4-15.0)  %


 


Plt Count  183    (117-477)  x10(3)uL


 


MPV  8.9    (6.7-11.0)  fL


 


Add Manual Diff  Yes    


 


Neutrophils % (Manual)  87 H    (46-82)  %


 


Band Neutrophils %  3    (0-6)  %


 


Lymphocytes % (Manual)  7 L    (13-37)  %


 


Monocytes % (Manual)  3 L    (4-12)  %


 


Sodium   137   (135-145)  mmol/L


 


Potassium   3.6   (3.5-5.3)  mmol/L


 


Chloride   98 L   (100-110)  mmol/L


 


Carbon Dioxide   27   (21-32)  mmol/L


 


BUN   61 H D   (7-18)  mg/dL


 


Creatinine   2.3 H*   (0.70-1.30)  mg/dL


 


Est Cr Clr Drug Dosing   21.16   mL/min


 


Estimated GFR (MDRD)   27 L   (>60)  


 


BUN/Creatinine Ratio   26.5 H   (9-20)  


 


Glucose   136 H   ()  mg/dL


 


Calcium   9.1   (8.6-10.2)  mg/dL


 


NT-Pro-B Natriuret Pep    51628 H*  (<=450)  pg/mL











Result Diagrams: 


                                 01/03/22 06:20





                                 01/03/22 06:20





Sepsis Event Note





- Evaluation


Sepsis Screening Result: No Definite Risk





- Focused Exam


Vital Signs: 


                                   Vital Signs











  Temp Pulse Resp BP Pulse Ox Pulse Ox


 


 01/03/22 00:14       97


 


 01/03/22 00:00  98.2 F  64  15  98/52 L  96 














- Problem List & Annotations


(1) CHF exacerbation


SNOMED Code(s): 747274150, 51575413137354


   Code(s): I50.9 - HEART FAILURE, UNSPECIFIED   Status: Acute   Current Visit: 

Yes   


Qualifiers: 


   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 

(congestive) heart failure   





(2) Afib


SNOMED Code(s): 50962498


   Code(s): I48.91 - UNSPECIFIED ATRIAL FIBRILLATION   Status: Acute   Current 

Visit: Yes   


Qualifiers: 


   Atrial fibrillation type: paroxysmal   Qualified Code(s): I48.0 - Paroxysmal 

atrial fibrillation   





(3) Long-term (current) use of anticoagulants, INR goal 2.0-3.0


SNOMED Code(s): 926151164, 053855195


   Code(s): Z79.01 - LONG TERM (CURRENT) USE OF ANTICOAGULANTS   Status: Acute  

 Current Visit: Yes   





(4) CKD (chronic kidney disease)


SNOMED Code(s): 981446514


   Code(s): N18.9 - CHRONIC KIDNEY DISEASE, UNSPECIFIED   Status: Chronic   

Current Visit: Yes   


Qualifiers: 


   Chronic kidney disease stage: stage 3 (moderate) 





(5) COPD (chronic obstructive pulmonary disease)


SNOMED Code(s): 47415605


   Code(s): J44.9 - CHRONIC OBSTRUCTIVE PULMONARY DISEASE, UNSPECIFIED   Status:

 Chronic   Current Visit: Yes   


Qualifiers: 


   Chronic bronchitis type: unspecified 





(6) Elevated troponin


SNOMED Code(s): 549414126, 684798426, 297476071


   Code(s): R77.8 - OTHER SPECIFIED ABNORMALITIES OF PLASMA PROTEINS   Status: 

Acute   Current Visit: No   





(7) Non-STEMI (non-ST elevated myocardial infarction)


SNOMED Code(s): 42561652


   Code(s): I21.4 - NON-ST ELEVATION (NSTEMI) MYOCARDIAL INFARCTION   Status: 

Acute   Current Visit: No   Annotation/Comment:: Mr Manriquez will be transferred

 to [facility] by ground ambulance tonight.    





(8) CAD (coronary artery disease)


SNOMED Code(s): 39161519


   Code(s): I25.10 - ATHSCL HEART DISEASE OF NATIVE CORONARY ARTERY W/O ANG P

CTRS   Status: Chronic   Current Visit: No   


Qualifiers: 


   Coronary Disease-Associated Artery/Lesion type: bypass graft   Associated 

angina: without angina 





(9) HTN (hypertension)


SNOMED Code(s): 03433273


   Code(s): I10 - ESSENTIAL (PRIMARY) HYPERTENSION   Status: Chronic   Current 

Visit: No   


Qualifiers: 


   Hypertension type: primary hypertension   Qualified Code(s): I10 - Essential 

(primary) hypertension   





- Problem List Review


Problem List Initiated/Reviewed/Updated: Yes





- My Orders


Last 24 Hours: 


My Active Orders





01/02/22 21:00


Melatonin   9 mg PO BEDTIME 





01/03/22 09:07


IS (RT) [RT Incentive Spirometry] [RC] Q4HWA 





01/04/22 05:11


BASIC METABOLIC PANEL,BMP [CHEM] DAILY 


PRO B-TYPE NATRIUR PEPT,BNPPRO [CHEM] DAILY 





01/04/22 16:00


Furosemide [Lasix]   20 mg IVPUSH DAILY 





01/05/22 05:11


BASIC METABOLIC PANEL,BMP [CHEM] DAILY 


PRO B-TYPE NATRIUR PEPT,BNPPRO [CHEM] DAILY 





01/06/22 05:11


BASIC METABOLIC PANEL,BMP [CHEM] DAILY 














- Plan


Plan:: 


 I have increased Lasix dose. I have stopped Zaroxolyn and will try Aldactone 

for CHF . He is on Solumedrol .EDUARDO BOWER.

## 2022-01-04 RX ADMIN — Medication PRN ML: at 21:10

## 2022-01-04 RX ADMIN — Medication PRN ML: at 14:15

## 2022-01-04 RX ADMIN — Medication PRN ML: at 08:28

## 2022-01-04 RX ADMIN — Medication PRN ML: at 06:58

## 2022-01-04 RX ADMIN — Medication PRN ML: at 16:25

## 2022-01-04 RX ADMIN — TIOTROPIUM BROMIDE INHALATION SPRAY SCH PUFF: 3.12 SPRAY, METERED RESPIRATORY (INHALATION) at 08:16

## 2022-01-04 RX ADMIN — POTASSIUM CHLORIDE SCH MEQ: 1500 TABLET, EXTENDED RELEASE ORAL at 20:13

## 2022-01-04 RX ADMIN — SODIUM CHLORIDE SCH MG: 9 INJECTION, SOLUTION INTRAVENOUS at 21:09

## 2022-01-04 RX ADMIN — POTASSIUM CHLORIDE SCH MEQ: 1500 TABLET, EXTENDED RELEASE ORAL at 08:15

## 2022-01-04 RX ADMIN — SODIUM CHLORIDE SCH MG: 9 INJECTION, SOLUTION INTRAVENOUS at 14:15

## 2022-01-04 RX ADMIN — HYDROCORTISONE PRN APPLIC: 10 CREAM TOPICAL at 16:00

## 2022-01-04 RX ADMIN — SODIUM CHLORIDE SCH MG: 9 INJECTION, SOLUTION INTRAVENOUS at 06:58

## 2022-01-04 RX ADMIN — CYANOCOBALAMIN TAB 1000 MCG SCH MCG: 1000 TAB at 08:17

## 2022-01-04 NOTE — PCM.PN
- General Info


Date of Service: 01/04/22


Subjective Update: 


He is much better today. Off O2. 





- Review of Systems


General: Reports: Weakness


Pulmonary: Reports: Shortness of Breath


Cardiovascular: Reports: Edema.  Denies: Dyspnea on Exertion, Lightheadedness


Gastrointestinal: Reports: No Symptoms





- Patient Data


Vitals - Most Recent: 


                                Last Vital Signs











Temp  96.9 F   01/04/22 08:00


 


Pulse  70   01/04/22 08:00


 


Resp  18   01/04/22 08:00


 


BP  116/61   01/04/22 08:00


 


Pulse Ox  93 L  01/04/22 08:00











Weight - Most Recent: 75.381 kg


I&O - Last 24 Hours: 


                                 Intake & Output











 01/03/22 01/04/22 01/04/22





 22:59 06:59 14:59


 


Intake Total 1350 100 


 


Output Total 1200 600 


 


Balance 150 -500 











Lab Results Last 24 Hours: 


                         Laboratory Results - last 24 hr











  01/04/22 01/04/22 Range/Units





  06:35 06:35 


 


Sodium   137  (135-145)  mmol/L


 


Potassium   3.0 L  (3.5-5.3)  mmol/L


 


Chloride   98 L  (100-110)  mmol/L


 


Carbon Dioxide   30  (21-32)  mmol/L


 


BUN   69 H  (7-18)  mg/dL


 


Creatinine   2.1 H*  (0.70-1.30)  mg/dL


 


Est Cr Clr Drug Dosing   23.17  mL/min


 


Estimated GFR (MDRD)   30 L  (>60)  


 


BUN/Creatinine Ratio   32.9 H  (9-20)  


 


Glucose   129 H  ()  mg/dL


 


Calcium   9.2  (8.6-10.2)  mg/dL


 


NT-Pro-B Natriuret Pep  2321 H*   (<=450)  pg/mL











Med Orders - Current: 


                               Current Medications





Acetaminophen (Acetaminophen 500 Mg Tab)  1,000 mg PO Q6H PRN


   PRN Reason: Pain


Albuterol/Ipratropium (Albuterol/Ipratropium 3.0-0.5 Mg/3 Ml Neb Soln)  3 ml INH

Q6H PRN


   PRN Reason: Shortness Of Breath/wheezing


Cyanocobalamin (Cyanocobalamin (Vitamin B12) 1,000 Mcg Tab)  1,000 mcg PO DAILY 

[facility]


   Last Admin: 01/04/22 08:17 Dose:  1,000 mcg


   Documented by: 


Furosemide (Furosemide 40 Mg/4 Ml Vial)  40 mg IVPUSH DAILY [facility]


   Last Admin: 01/04/22 08:19 Dose:  40 mg


   Documented by: 


Furosemide (Furosemide 20 Mg/2 Ml Vial)  20 mg IVPUSH DAILY@1600 [facility]


   Last Admin: 01/03/22 16:35 Dose:  20 mg


   Documented by: 


Melatonin (Melatonin 3 Mg Tab)  9 mg PO BEDTIME [facility]


   Last Admin: 01/03/22 21:12 Dose:  9 mg


   Documented by: 


Methylprednisolone Sodium Succinate (Methylprednisolone Sodium Succinate 125 

Mg/2 Ml Sdv)  125 mg IVPUSH Q8H [facility]


   Last Admin: 01/04/22 06:58 Dose:  125 mg


   Documented by: 


Morphine Sulfate (Morphine 2 Mg/Ml Syringe)  2 mg IVPUSH Q2H PRN


   PRN Reason: Pain


Nitroglycerin (Nitroglycerin 0.4 Mg Tab.Sl)  0.4 mg SL Q5M PRN


   PRN Reason: Chest Pain


Pantoprazole Sodium (Pantoprazole 40 Mg Tab.Cr)  40 mg PO DAILY [facility]


   Last Admin: 01/04/22 08:16 Dose:  40 mg


   Documented by: 


Potassium Chloride (Potassium Chloride 20 Meq Tab.Er)  20 meq PO BID [facility]


   Last Admin: 01/04/22 08:15 Dose:  20 meq


   Documented by: 


Senna/Docusate Sodium (Docusate Sodium/Sennosides 50-8.6 Mg Tab)  1 tab PO BID 

PRN


   PRN Reason: Constipation


Sodium Chloride (Sodium Chloride 0.9% 10 Ml Syringe)  10 ml FLUSH ASDIRECTED PRN


   PRN Reason: Keep Vein Open


   Last Admin: 01/04/22 08:28 Dose:  10 ml


   Documented by: 


Spironolactone (Spironolactone 50 Mg Tab)  50 mg PO BID [facility]


   Last Admin: 01/04/22 08:15 Dose:  50 mg


   Documented by: 


Tiotropium Bromide (Tiotropium Bromide 4 Gm Inhalation Spray (2.5mcg/1 Dose; 10 

Doses))  0 gm INH DAILY [facility]


   Last Admin: 01/04/22 08:16 Dose:  2 puff


   Documented by: 


Trazodone HCl (Trazodone 50 Mg Tab)  50 mg PO BEDTIME PRN


   PRN Reason: Sleep


Warfarin Sodium (Warfarin 2.5 Mg Tab)  2.5 mg PO 1600 [facility]


   Last Admin: 01/03/22 16:34 Dose:  2.5 mg


   Documented by: 


Warfarin Sodium (Warfarin Sliding Scale)  1 each PO ASDIRECTED [facility]





Discontinued Medications





Albuterol/Ipratropium (Albuterol/Ipratropium 3.0-0.5 Mg/3 Ml Neb Soln)  3 ml NEB

ONETIME ONE


   Stop: 01/01/22 22:14


   Last Admin: 01/01/22 22:22 Dose:  3 ml


   Documented by: 


Albuterol/Ipratropium (Albuterol/Ipratropium 3.0-0.5 Mg/3 Ml Neb Soln)  3 ml NEB

Q6H PRN


   PRN Reason: Dyspnea


Carvedilol (Carvedilol 6.25 Mg Tab)  6.25 mg PO BID [facility]


   Last Admin: 01/03/22 08:57 Dose:  Not Given


   Documented by: 


Furosemide (Furosemide 40 Mg/4 Ml Vial)  40 mg IVPUSH NOW ONE


   Stop: 01/01/22 22:14


   Last Admin: 01/01/22 22:35 Dose:  40 mg


   Documented by: 


Methylprednisolone Sodium Succinate (Methylprednisolone Sodium Succinate 125 

Mg/2 Ml Sdv)  125 mg IVPUSH ONETIME ONE


   Stop: 01/01/22 22:14


   Last Admin: 01/01/22 22:42 Dose:  125 mg


   Documented by: 


Metolazone (Metolazone 2.5 Mg Tab)  2.5 mg PO ONETIME ONE


   Stop: 01/01/22 22:14


   Last Admin: 01/01/22 22:22 Dose:  2.5 mg


   Documented by: 


Metolazone (Metolazone 2.5 Mg Tab)  2.5 mg PO DAILY@0800 [facility]


   Last Admin: 01/03/22 08:52 Dose:  2.5 mg


   Documented by: 


Morphine Sulfate (Morphine 2 Mg/Ml Syringe)  2 mg IVPUSH ONETIME ONE


   Stop: 01/01/22 22:14


   Last Admin: 01/01/22 22:47 Dose:  2 mg


   Documented by: 


Non-Formulary Medication (Melatonin [Melatonin])  10 mg PO BEDTIME [facility]











- Exam


General: Alert, Oriented


Lungs: Crackles, Rales


Cardiovascular: Regular Rate


GI/Abdominal Exam: Normal Bowel Sounds


Extremities: Pedal Edema





- Patient Data


Lab Results Last 24 hrs: 


                         Laboratory Results - last 24 hr











  01/04/22 01/04/22 Range/Units





  06:35 06:35 


 


Sodium   137  (135-145)  mmol/L


 


Potassium   3.0 L  (3.5-5.3)  mmol/L


 


Chloride   98 L  (100-110)  mmol/L


 


Carbon Dioxide   30  (21-32)  mmol/L


 


BUN   69 H  (7-18)  mg/dL


 


Creatinine   2.1 H*  (0.70-1.30)  mg/dL


 


Est Cr Clr Drug Dosing   23.17  mL/min


 


Estimated GFR (MDRD)   30 L  (>60)  


 


BUN/Creatinine Ratio   32.9 H  (9-20)  


 


Glucose   129 H  ()  mg/dL


 


Calcium   9.2  (8.6-10.2)  mg/dL


 


NT-Pro-B Natriuret Pep  2321 H*   (<=450)  pg/mL











Result Diagrams: 


                                 01/03/22 06:20





                                 01/04/22 06:35





Sepsis Event Note





- Evaluation


Sepsis Screening Result: No Definite Risk





- Focused Exam


Vital Signs: 


                                   Vital Signs











  Temp Pulse Resp BP BP Pulse Ox Pulse Ox


 


 01/04/22 08:00  96.9 F  70  18   116/61  93 L 


 


 01/04/22 04:07  97.4 F  85  20  124/57 L   97 


 


 01/04/22 01:00        96


 


 01/04/22 00:00  97.5 F  84  20  131/66   96 














- Problem List & Annotations


(1) CHF exacerbation


SNOMED Code(s): 082037018, 57651238691337


   Code(s): I50.9 - HEART FAILURE, UNSPECIFIED   Status: Acute   Current Visit: 

Yes   


Qualifiers: 


   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 

(congestive) heart failure   





(2) Afib


SNOMED Code(s): 64001593


   Code(s): I48.91 - UNSPECIFIED ATRIAL FIBRILLATION   Status: Acute   Current 

Visit: Yes   


Qualifiers: 


   Atrial fibrillation type: paroxysmal   Qualified Code(s): I48.0 - Paroxysmal 

atrial fibrillation   





(3) Long-term (current) use of anticoagulants, INR goal 2.0-3.0


SNOMED Code(s): 556147841, 464567869


   Code(s): Z79.01 - LONG TERM (CURRENT) USE OF ANTICOAGULANTS   Status: Acute  

 Current Visit: Yes   





(4) CKD (chronic kidney disease)


SNOMED Code(s): 544868368


   Code(s): N18.9 - CHRONIC KIDNEY DISEASE, UNSPECIFIED   Status: Chronic   C

urrent Visit: Yes   


Qualifiers: 


   Chronic kidney disease stage: stage 3 (moderate) 





(5) COPD (chronic obstructive pulmonary disease)


SNOMED Code(s): 85226295


   Code(s): J44.9 - CHRONIC OBSTRUCTIVE PULMONARY DISEASE, UNSPECIFIED   Status:

 Chronic   Current Visit: Yes   


Qualifiers: 


   Chronic bronchitis type: unspecified 





(6) Elevated troponin


SNOMED Code(s): 863455380, 854247011, 298729764


   Code(s): R77.8 - OTHER SPECIFIED ABNORMALITIES OF PLASMA PROTEINS   Status: 

Acute   Current Visit: No   





(7) Non-STEMI (non-ST elevated myocardial infarction)


SNOMED Code(s): 51885261


   Code(s): I21.4 - NON-ST ELEVATION (NSTEMI) MYOCARDIAL INFARCTION   Status: 

Acute   Current Visit: No   Annotation/Comment:: Mr Manriquez will be transferred

 to [facility] by ground ambulance tonight.    





(8) CAD (coronary artery disease)


SNOMED Code(s): 58966470


   Code(s): I25.10 - ATHSCL HEART DISEASE OF NATIVE CORONARY ARTERY W/O ANG 

PCTRS   Status: Chronic   Current Visit: No   


Qualifiers: 


   Coronary Disease-Associated Artery/Lesion type: bypass graft   Associated 

angina: without angina 





(9) HTN (hypertension)


SNOMED Code(s): 40022119


   Code(s): I10 - ESSENTIAL (PRIMARY) HYPERTENSION   Status: Chronic   Current 

Visit: No   


Qualifiers: 


   Hypertension type: primary hypertension   Qualified Code(s): I10 - Essential 

(primary) hypertension   





- Problem List Review


Problem List Initiated/Reviewed/Updated: Yes





- My Orders


Last 24 Hours: 


My Active Orders





01/03/22 09:07


IS (RT) [RT Incentive Spirometry] [RC] Q4HWA 





01/03/22 09:30


Spironolactone [Aldactone]   50 mg PO BID 





01/03/22 09:45


Warfarin Sliding Scale [Coumadin Sliding Scale]   1 each PO ASDIRECTED 





01/03/22 16:00


Furosemide [Lasix]   20 mg IVPUSH DAILY@1600 





01/04/22 09:14


CXR [Chest 2V] [CR] Routine 





01/05/22 05:11


BASIC METABOLIC PANEL,BMP [CHEM] DAILY 


CBC WITH AUTO DIFF [HEME] AM 


INR,PT,PROTHROMBIN TIME [COAG] DAILY 


PRO B-TYPE NATRIUR PEPT,BNPPRO [CHEM] DAILY 





01/06/22 05:11


BASIC METABOLIC PANEL,BMP [CHEM] DAILY 


INR,PT,PROTHROMBIN TIME [COAG] DAILY 





01/07/22 05:11


INR,PT,PROTHROMBIN TIME [COAG] DAILY 





01/08/22 05:11


INR,PT,PROTHROMBIN TIME [COAG] DAILY 





01/09/22 05:11


INR,PT,PROTHROMBIN TIME [COAG] DAILY 





01/10/22 05:11


INR,PT,PROTHROMBIN TIME [COAG] DAILY 





01/11/22 05:11


INR,PT,PROTHROMBIN TIME [COAG] DAILY 














- Plan


Plan:: 


 Continue current therapy for 2 more days. Tonight chest x-ray today. Repeat 

labs the morning. Probably discharge towards end of the week.

## 2022-01-05 RX ADMIN — HYDROCORTISONE PRN APPLIC: 10 CREAM TOPICAL at 21:22

## 2022-01-05 RX ADMIN — SODIUM CHLORIDE SCH MG: 9 INJECTION, SOLUTION INTRAVENOUS at 21:28

## 2022-01-05 RX ADMIN — HYDROCORTISONE PRN APPLIC: 10 CREAM TOPICAL at 10:00

## 2022-01-05 RX ADMIN — Medication PRN ML: at 08:40

## 2022-01-05 RX ADMIN — Medication PRN ML: at 05:50

## 2022-01-05 RX ADMIN — HYDROCORTISONE PRN APPLIC: 10 CREAM TOPICAL at 02:15

## 2022-01-05 RX ADMIN — POTASSIUM CHLORIDE SCH MEQ: 1500 TABLET, EXTENDED RELEASE ORAL at 08:40

## 2022-01-05 RX ADMIN — CYANOCOBALAMIN TAB 1000 MCG SCH MCG: 1000 TAB at 08:41

## 2022-01-05 RX ADMIN — SODIUM CHLORIDE SCH MG: 9 INJECTION, SOLUTION INTRAVENOUS at 14:30

## 2022-01-05 RX ADMIN — Medication PRN ML: at 16:06

## 2022-01-05 RX ADMIN — TIOTROPIUM BROMIDE INHALATION SPRAY SCH PUFF: 3.12 SPRAY, METERED RESPIRATORY (INHALATION) at 08:40

## 2022-01-05 RX ADMIN — SODIUM CHLORIDE SCH MG: 9 INJECTION, SOLUTION INTRAVENOUS at 05:47

## 2022-01-05 RX ADMIN — Medication PRN ML: at 14:30

## 2022-01-05 RX ADMIN — Medication PRN ML: at 21:31

## 2022-01-05 NOTE — PCM.PN
- General Info


Date of Service: 01/05/22


Subjective Update: 


He is much better today. Off O2. 


Functional Status: Reports: Pain Controlled





- Review of Systems


General: Reports: No Symptoms


HEENT: Reports: No Symptoms


Pulmonary: Reports: No Symptoms


Skin: Reports: Pruritis


Neurological: Reports: No Symptoms





- Patient Data


Vitals - Most Recent: 


                                Last Vital Signs











Temp  98.3 F   01/05/22 05:00


 


Pulse  70   01/05/22 05:00


 


Resp  18   01/05/22 05:00


 


BP  114/61   01/05/22 05:00


 


Pulse Ox  95   01/05/22 05:00











Weight - Most Recent: 73.936 kg


I&O - Last 24 Hours: 


                                 Intake & Output











 01/04/22 01/05/22 01/05/22





 22:59 06:59 14:59


 


Intake Total 300 200 


 


Output Total 1000 900 


 


Balance -700 -700 











Lab Results Last 24 Hours: 


                         Laboratory Results - last 24 hr











  01/05/22 01/05/22 01/05/22 Range/Units





  06:40 06:40 06:40 


 


WBC     (3.2-10.1)  x10-3/uL


 


RBC     (3.90-5.90)  x10(6)uL


 


Hgb     (12.9-17.7)  g/dL


 


Hct     (38.3-50.1)  %


 


MCV     (80.8-98.7)  fL


 


MCH     (27.0-33.3)  pg


 


MCHC     (28.7-35.3)  g/dL


 


RDW     (12.4-15.0)  %


 


Plt Count     (117-477)  x10(3)uL


 


MPV     (6.7-11.0)  fL


 


Add Manual Diff     


 


Neutrophils % (Manual)     (46-82)  %


 


Band Neutrophils %     (0-6)  %


 


Lymphocytes % (Manual)     (13-37)  %


 


Monocytes % (Manual)     (4-12)  %


 


PT    33.8 H  (9.0-11.1)  sec


 


INR    3.39 H  (1.00-1.24)  


 


Sodium   137   (135-145)  mmol/L


 


Potassium   2.7 L*   (3.5-5.3)  mmol/L


 


Chloride   96 L   (100-110)  mmol/L


 


Carbon Dioxide   32   (21-32)  mmol/L


 


BUN   70 H   (7-18)  mg/dL


 


Creatinine   2.0 H*   (0.70-1.30)  mg/dL


 


Est Cr Clr Drug Dosing   24.33   mL/min


 


Estimated GFR (MDRD)   32 L   (>60)  


 


BUN/Creatinine Ratio   35.0 H   (9-20)  


 


Glucose   131 H   ()  mg/dL


 


Calcium   8.9   (8.6-10.2)  mg/dL


 


NT-Pro-B Natriuret Pep  96609 H*    (<=450)  pg/mL














  01/05/22 Range/Units





  06:40 


 


WBC  9.2  (3.2-10.1)  x10-3/uL


 


RBC  3.45 L  (3.90-5.90)  x10(6)uL


 


Hgb  8.8 L  (12.9-17.7)  g/dL


 


Hct  27.7 L  (38.3-50.1)  %


 


MCV  80.3 L  (80.8-98.7)  fL


 


MCH  25.6 L  (27.0-33.3)  pg


 


MCHC  31.9  (28.7-35.3)  g/dL


 


RDW  17.9 H  (12.4-15.0)  %


 


Plt Count  166  (117-477)  x10(3)uL


 


MPV  8.6  (6.7-11.0)  fL


 


Add Manual Diff  Yes  


 


Neutrophils % (Manual)  86 H  (46-82)  %


 


Band Neutrophils %  4  (0-6)  %


 


Lymphocytes % (Manual)  7 L  (13-37)  %


 


Monocytes % (Manual)  3 L  (4-12)  %


 


PT   (9.0-11.1)  sec


 


INR   (1.00-1.24)  


 


Sodium   (135-145)  mmol/L


 


Potassium   (3.5-5.3)  mmol/L


 


Chloride   (100-110)  mmol/L


 


Carbon Dioxide   (21-32)  mmol/L


 


BUN   (7-18)  mg/dL


 


Creatinine   (0.70-1.30)  mg/dL


 


Est Cr Clr Drug Dosing   mL/min


 


Estimated GFR (MDRD)   (>60)  


 


BUN/Creatinine Ratio   (9-20)  


 


Glucose   ()  mg/dL


 


Calcium   (8.6-10.2)  mg/dL


 


NT-Pro-B Natriuret Pep   (<=450)  pg/mL











Med Orders - Current: 


                               Current Medications





Acetaminophen (Acetaminophen 500 Mg Tab)  1,000 mg PO Q6H PRN


   PRN Reason: Pain


Albuterol/Ipratropium (Albuterol/Ipratropium 3.0-0.5 Mg/3 Ml Neb Soln)  3 ml INH

Q6H PRN


   PRN Reason: Shortness Of Breath/wheezing


Cyanocobalamin (Cyanocobalamin (Vitamin B12) 1,000 Mcg Tab)  1,000 mcg PO DAILY 

Pending sale to [facility]


   Last Admin: 01/05/22 08:41 Dose:  1,000 mcg


   Documented by: 


Furosemide (Furosemide 40 Mg/4 Ml Vial)  40 mg IVPUSH DAILY Pending sale to [facility]


   Last Admin: 01/05/22 08:40 Dose:  40 mg


   Documented by: 


Furosemide (Furosemide 20 Mg/2 Ml Vial)  20 mg IVPUSH DAILY@1600 Pending sale to [facility]


   Last Admin: 01/04/22 16:25 Dose:  20 mg


   Documented by: 


Hydrocortisone Acetate (Hydrocortisone Acetate 1% Crm 30 Gm Tube)  1 gm TOP TID 

PRN


   PRN Reason: Itching


   Last Admin: 01/05/22 10:00 Dose:  1 applic


   Documented by: 


Melatonin (Melatonin 3 Mg Tab)  9 mg PO BEDTIME Pending sale to [facility]


   Last Admin: 01/04/22 20:13 Dose:  9 mg


   Documented by: 


Methylprednisolone Sodium Succinate (Methylprednisolone Sodium Succinate 125 

Mg/2 Ml Sdv)  125 mg IVPUSH Q8H Pending sale to [facility]


   Last Admin: 01/05/22 05:47 Dose:  125 mg


   Documented by: 


Morphine Sulfate (Morphine 2 Mg/Ml Syringe)  2 mg IVPUSH Q2H PRN


   PRN Reason: Pain


Nitroglycerin (Nitroglycerin 0.4 Mg Tab.Sl)  0.4 mg SL Q5M PRN


   PRN Reason: Chest Pain


Pantoprazole Sodium (Pantoprazole 40 Mg Tab.Cr)  40 mg PO DAILY Pending sale to [facility]


   Last Admin: 01/05/22 08:40 Dose:  40 mg


   Documented by: 


Potassium Chloride (Potassium Chloride 20 Meq Tab.Er)  40 meq PO BID Pending sale to [facility]


Senna/Docusate Sodium (Docusate Sodium/Sennosides 50-8.6 Mg Tab)  1 tab PO BID 

PRN


   PRN Reason: Constipation


Sodium Chloride (Sodium Chloride 0.9% 10 Ml Syringe)  10 ml FLUSH ASDIRECTED PRN


   PRN Reason: Keep Vein Open


   Last Admin: 01/05/22 08:40 Dose:  10 ml


   Documented by: 


Spironolactone (Spironolactone 50 Mg Tab)  50 mg PO BID Pending sale to [facility]


   Last Admin: 01/05/22 08:40 Dose:  50 mg


   Documented by: 


Tiotropium Bromide (Tiotropium Bromide 4 Gm Inhalation Spray (2.5mcg/1 Dose; 10 

Doses))  0 gm INH DAILY Pending sale to [facility]


   Last Admin: 01/05/22 08:40 Dose:  2 puff


   Documented by: 


Trazodone HCl (Trazodone 50 Mg Tab)  50 mg PO BEDTIME PRN


   PRN Reason: Sleep


Warfarin Sodium (Warfarin Sliding Scale)  1 each PO ASDIRECTED Pending sale to [facility]





Discontinued Medications





Albuterol/Ipratropium (Albuterol/Ipratropium 3.0-0.5 Mg/3 Ml Neb Soln)  3 ml NEB

ONETIME ONE


   Stop: 01/01/22 22:14


   Last Admin: 01/01/22 22:22 Dose:  3 ml


   Documented by: 


Albuterol/Ipratropium (Albuterol/Ipratropium 3.0-0.5 Mg/3 Ml Neb Soln)  3 ml NEB

Q6H PRN


   PRN Reason: Dyspnea


Carvedilol (Carvedilol 6.25 Mg Tab)  6.25 mg PO BID Pending sale to [facility]


   Last Admin: 01/03/22 08:57 Dose:  Not Given


   Documented by: 


Furosemide (Furosemide 40 Mg/4 Ml Vial)  40 mg IVPUSH NOW ONE


   Stop: 01/01/22 22:14


   Last Admin: 01/01/22 22:35 Dose:  40 mg


   Documented by: 


Methylprednisolone Sodium Succinate (Methylprednisolone Sodium Succinate 125 

Mg/2 Ml Sdv)  125 mg IVPUSH ONETIME ONE


   Stop: 01/01/22 22:14


   Last Admin: 01/01/22 22:42 Dose:  125 mg


   Documented by: 


Metolazone (Metolazone 2.5 Mg Tab)  2.5 mg PO ONETIME ONE


   Stop: 01/01/22 22:14


   Last Admin: 01/01/22 22:22 Dose:  2.5 mg


   Documented by: 


Metolazone (Metolazone 2.5 Mg Tab)  2.5 mg PO DAILY@0800 Pending sale to [facility]


   Last Admin: 01/03/22 08:52 Dose:  2.5 mg


   Documented by: 


Morphine Sulfate (Morphine 2 Mg/Ml Syringe)  2 mg IVPUSH ONETIME ONE


   Stop: 01/01/22 22:14


   Last Admin: 01/01/22 22:47 Dose:  2 mg


   Documented by: 


Non-Formulary Medication (Melatonin [Melatonin])  10 mg PO BEDTIME Pending sale to [facility]


Potassium Chloride (Potassium Chloride 20 Meq Tab.Er)  20 meq PO BID Pending sale to [facility]


   Last Admin: 01/05/22 08:40 Dose:  20 meq


   Documented by: 


Warfarin Sodium (Warfarin 2.5 Mg Tab)  2.5 mg PO 1600 Pending sale to [facility]


   Last Admin: 01/03/22 16:34 Dose:  2.5 mg


   Documented by: 











- Exam


General: Alert, Oriented


HEENT: Pupils Equal


Neck: Supple


Lungs: Clear to Auscultation


Cardiovascular: Regular Rate


GI/Abdominal Exam: Normal Bowel Sounds





- Patient Data


Lab Results Last 24 hrs: 


                         Laboratory Results - last 24 hr











  01/05/22 01/05/22 01/05/22 Range/Units





  06:40 06:40 06:40 


 


WBC     (3.2-10.1)  x10-3/uL


 


RBC     (3.90-5.90)  x10(6)uL


 


Hgb     (12.9-17.7)  g/dL


 


Hct     (38.3-50.1)  %


 


MCV     (80.8-98.7)  fL


 


MCH     (27.0-33.3)  pg


 


MCHC     (28.7-35.3)  g/dL


 


RDW     (12.4-15.0)  %


 


Plt Count     (117-477)  x10(3)uL


 


MPV     (6.7-11.0)  fL


 


Add Manual Diff     


 


Neutrophils % (Manual)     (46-82)  %


 


Band Neutrophils %     (0-6)  %


 


Lymphocytes % (Manual)     (13-37)  %


 


Monocytes % (Manual)     (4-12)  %


 


PT    33.8 H  (9.0-11.1)  sec


 


INR    3.39 H  (1.00-1.24)  


 


Sodium   137   (135-145)  mmol/L


 


Potassium   2.7 L*   (3.5-5.3)  mmol/L


 


Chloride   96 L   (100-110)  mmol/L


 


Carbon Dioxide   32   (21-32)  mmol/L


 


BUN   70 H   (7-18)  mg/dL


 


Creatinine   2.0 H*   (0.70-1.30)  mg/dL


 


Est Cr Clr Drug Dosing   24.33   mL/min


 


Estimated GFR (MDRD)   32 L   (>60)  


 


BUN/Creatinine Ratio   35.0 H   (9-20)  


 


Glucose   131 H   ()  mg/dL


 


Calcium   8.9   (8.6-10.2)  mg/dL


 


NT-Pro-B Natriuret Pep  59156 H*    (<=450)  pg/mL














  01/05/22 Range/Units





  06:40 


 


WBC  9.2  (3.2-10.1)  x10-3/uL


 


RBC  3.45 L  (3.90-5.90)  x10(6)uL


 


Hgb  8.8 L  (12.9-17.7)  g/dL


 


Hct  27.7 L  (38.3-50.1)  %


 


MCV  80.3 L  (80.8-98.7)  fL


 


MCH  25.6 L  (27.0-33.3)  pg


 


MCHC  31.9  (28.7-35.3)  g/dL


 


RDW  17.9 H  (12.4-15.0)  %


 


Plt Count  166  (117-477)  x10(3)uL


 


MPV  8.6  (6.7-11.0)  fL


 


Add Manual Diff  Yes  


 


Neutrophils % (Manual)  86 H  (46-82)  %


 


Band Neutrophils %  4  (0-6)  %


 


Lymphocytes % (Manual)  7 L  (13-37)  %


 


Monocytes % (Manual)  3 L  (4-12)  %


 


PT   (9.0-11.1)  sec


 


INR   (1.00-1.24)  


 


Sodium   (135-145)  mmol/L


 


Potassium   (3.5-5.3)  mmol/L


 


Chloride   (100-110)  mmol/L


 


Carbon Dioxide   (21-32)  mmol/L


 


BUN   (7-18)  mg/dL


 


Creatinine   (0.70-1.30)  mg/dL


 


Est Cr Clr Drug Dosing   mL/min


 


Estimated GFR (MDRD)   (>60)  


 


BUN/Creatinine Ratio   (9-20)  


 


Glucose   ()  mg/dL


 


Calcium   (8.6-10.2)  mg/dL


 


NT-Pro-B Natriuret Pep   (<=450)  pg/mL











Result Diagrams: 


                                 01/05/22 06:40





                                 01/05/22 06:40





Sepsis Event Note





- Evaluation


Sepsis Screening Result: No Definite Risk





- Focused Exam


Vital Signs: 


                                   Vital Signs











  Temp Temp Pulse Resp BP Pulse Ox


 


 01/05/22 05:00   98.3 F  70  18  114/61  95


 


 01/05/22 02:00  98.2 F   71  18  116/63  95














- Problem List & Annotations


(1) CHF exacerbation


SNOMED Code(s): 322990747, 91078253149700


   Code(s): I50.9 - HEART FAILURE, UNSPECIFIED   Status: Acute   Current Visit: 

Yes   


Qualifiers: 


   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

riaz) heart failure   





(2) Afib


SNOMED Code(s): 88730523


   Code(s): I48.91 - UNSPECIFIED ATRIAL FIBRILLATION   Status: Acute   Current 

Visit: Yes   


Qualifiers: 


   Atrial fibrillation type: paroxysmal   Qualified Code(s): I48.0 - Paroxysmal 

atrial fibrillation   





(3) Long-term (current) use of anticoagulants, INR goal 2.0-3.0


SNOMED Code(s): 362888030, 272691793


   Code(s): Z79.01 - LONG TERM (CURRENT) USE OF ANTICOAGULANTS   Status: Acute  

 Current Visit: Yes   





(4) CKD (chronic kidney disease)


SNOMED Code(s): 460937639


   Code(s): N18.9 - CHRONIC KIDNEY DISEASE, UNSPECIFIED   Status: Chronic   

Current Visit: Yes   


Qualifiers: 


   Chronic kidney disease stage: stage 3 (moderate) 





(5) COPD (chronic obstructive pulmonary disease)


SNOMED Code(s): 56624880


   Code(s): J44.9 - CHRONIC OBSTRUCTIVE PULMONARY DISEASE, UNSPECIFIED   Status:

 Chronic   Current Visit: Yes   


Qualifiers: 


   Chronic bronchitis type: unspecified 





(6) Elevated troponin


SNOMED Code(s): 599696850, 807652850, 934060625


   Code(s): R77.8 - OTHER SPECIFIED ABNORMALITIES OF PLASMA PROTEINS   Status: 

Acute   Current Visit: No   





(7) Non-STEMI (non-ST elevated myocardial infarction)


SNOMED Code(s): 19637553


   Code(s): I21.4 - NON-ST ELEVATION (NSTEMI) MYOCARDIAL INFARCTION   Status: 

Acute   Current Visit: No   Annotation/Comment:: Mr Manriquez will be transferred

 to [facility] by ground ambulance tonight.    





(8) CAD (coronary artery disease)


SNOMED Code(s): 52539023


   Code(s): I25.10 - ATHSCL HEART DISEASE OF NATIVE CORONARY ARTERY W/O ANG 

PCTRS   Status: Chronic   Current Visit: No   


Qualifiers: 


   Coronary Disease-Associated Artery/Lesion type: bypass graft   Associated 

angina: without angina 





(9) HTN (hypertension)


SNOMED Code(s): 13558454


   Code(s): I10 - ESSENTIAL (PRIMARY) HYPERTENSION   Status: Chronic   Current 

Visit: No   


Qualifiers: 


   Hypertension type: primary hypertension   Qualified Code(s): I10 - Essential 

(primary) hypertension   





(10) Pruritus


SNOMED Code(s): 055566160


   Code(s): L29.9 - PRURITUS, UNSPECIFIED   Status: Acute   Current Visit: Yes  

 





- Problem List Review


Problem List Initiated/Reviewed/Updated: Yes





- My Orders


Last 24 Hours: 


My Active Orders





01/04/22 10:28


Hydrocortisone Acetate [Hydrocortisone Acetate 1% Crm]   1 gm TOP TID PRN 





01/05/22 21:00


Potassium Chloride [Klor-Con M20]   40 meq PO BID 





01/06/22 05:11


BASIC METABOLIC PANEL,BMP [CHEM] DAILY 


INR,PT,PROTHROMBIN TIME [COAG] DAILY 


PRO B-TYPE NATRIUR PEPT,BNPPRO [CHEM] DAILY 





01/07/22 05:11


INR,PT,PROTHROMBIN TIME [COAG] DAILY 


PRO B-TYPE NATRIUR PEPT,BNPPRO [CHEM] DAILY 





01/08/22 05:11


INR,PT,PROTHROMBIN TIME [COAG] DAILY 





01/09/22 05:11


INR,PT,PROTHROMBIN TIME [COAG] DAILY 





01/10/22 05:11


INR,PT,PROTHROMBIN TIME [COAG] DAILY 





01/11/22 05:11


INR,PT,PROTHROMBIN TIME [COAG] DAILY 














- Plan


Plan:: 


 Alvin complains of pruritus, generalized. His weight has improved, and he is 

less short of breath. I will start him on when necessary Benadryl for itching, 

replace potassium, continue IV diuresis for another day

## 2022-01-06 RX ADMIN — Medication PRN ML: at 06:12

## 2022-01-06 RX ADMIN — HYDROCORTISONE PRN APPLIC: 10 CREAM TOPICAL at 20:49

## 2022-01-06 RX ADMIN — POTASSIUM CHLORIDE SCH MEQ: 1500 TABLET, EXTENDED RELEASE ORAL at 15:45

## 2022-01-06 RX ADMIN — POTASSIUM CHLORIDE SCH MEQ: 1500 TABLET, EXTENDED RELEASE ORAL at 20:48

## 2022-01-06 RX ADMIN — SODIUM CHLORIDE SCH MG: 9 INJECTION, SOLUTION INTRAVENOUS at 06:10

## 2022-01-06 RX ADMIN — SODIUM CHLORIDE SCH MG: 9 INJECTION, SOLUTION INTRAVENOUS at 15:44

## 2022-01-06 RX ADMIN — BUMETANIDE SCH MG: 0.25 INJECTION INTRAMUSCULAR; INTRAVENOUS at 15:41

## 2022-01-06 RX ADMIN — Medication PRN ML: at 15:44

## 2022-01-06 RX ADMIN — POTASSIUM CHLORIDE SCH MEQ: 1500 TABLET, EXTENDED RELEASE ORAL at 08:51

## 2022-01-06 RX ADMIN — Medication PRN ML: at 22:54

## 2022-01-06 RX ADMIN — SODIUM CHLORIDE SCH MG: 9 INJECTION, SOLUTION INTRAVENOUS at 22:54

## 2022-01-06 RX ADMIN — TIOTROPIUM BROMIDE INHALATION SPRAY SCH PUFF: 3.12 SPRAY, METERED RESPIRATORY (INHALATION) at 08:22

## 2022-01-06 RX ADMIN — Medication PRN ML: at 08:55

## 2022-01-06 RX ADMIN — BUMETANIDE SCH MG: 0.25 INJECTION INTRAMUSCULAR; INTRAVENOUS at 08:51

## 2022-01-06 RX ADMIN — Medication PRN ML: at 15:43

## 2022-01-06 RX ADMIN — CYANOCOBALAMIN TAB 1000 MCG SCH MCG: 1000 TAB at 08:21

## 2022-01-06 NOTE — PCM.PN
- General Info


Date of Service: 01/06/22


Subjective Update: 


Alvin has increased his weight by 1 & 1/2 pounds today. He does not need 

oxygen supplementation, however he still is short of breath and couldn't finish 

complete sentences. He denies any fever or chills.


Functional Status: Reports: Pain Controlled





- Review of Systems


General: Reports: No Symptoms


Gastrointestinal: Reports: No Symptoms


Genitourinary: Reports: No Symptoms





- Patient Data


Vitals - Most Recent: 


                                Last Vital Signs











Temp  98.3 F   01/06/22 08:00


 


Pulse  61   01/06/22 08:00


 


Resp  19   01/06/22 08:00


 


BP  117/59 L  01/06/22 08:00


 


Pulse Ox  97   01/06/22 08:00











Weight - Most Recent: 74.588 kg


Lab Results Last 24 Hours: 


                         Laboratory Results - last 24 hr











  01/06/22 01/06/22 01/06/22 Range/Units





  06:17 06:17 06:17 


 


PT   27.1 H   (9.0-11.1)  sec


 


INR   2.68 H   (1.00-1.24)  


 


Sodium  139    (135-145)  mmol/L


 


Potassium  2.8 L*    (3.5-5.3)  mmol/L


 


Chloride  97 L    (100-110)  mmol/L


 


Carbon Dioxide  33 H    (21-32)  mmol/L


 


BUN  70 H    (7-18)  mg/dL


 


Creatinine  2.1 H*    (0.70-1.30)  mg/dL


 


Est Cr Clr Drug Dosing  23.17    mL/min


 


Estimated GFR (MDRD)  30 L    (>60)  


 


BUN/Creatinine Ratio  33.3 H    (9-20)  


 


Glucose  131 H    ()  mg/dL


 


Calcium  8.9    (8.6-10.2)  mg/dL


 


NT-Pro-B Natriuret Pep    69860 H*  (<=450)  pg/mL











Med Orders - Current: 


                               Current Medications





Acetaminophen (Acetaminophen 500 Mg Tab)  1,000 mg PO Q6H PRN


   PRN Reason: Pain


Albuterol/Ipratropium (Albuterol/Ipratropium 3.0-0.5 Mg/3 Ml Neb Soln)  3 ml INH

Q6H PRN


   PRN Reason: Shortness Of Breath/wheezing


Bumetanide (Bumetanide 1 Mg/4 Ml Mdv)  1 mg IVPUSH BIDDIURETIC [facility]


Cyanocobalamin (Cyanocobalamin (Vitamin B12) 1,000 Mcg Tab)  1,000 mcg PO DAILY 

[facility]


   Last Admin: 01/06/22 08:21 Dose:  1,000 mcg


   Documented by: 


Diphenhydramine HCl (Diphenhydramine 25 Mg Cap)  25 mg PO Q6H PRN


   PRN Reason: Itching


   Last Admin: 01/05/22 21:10 Dose:  25 mg


   Documented by: 


Hydrocortisone Acetate (Hydrocortisone Acetate 1% Crm 30 Gm Tube)  1 gm TOP TID 

PRN


   PRN Reason: Itching


   Last Admin: 01/05/22 21:22 Dose:  1 applic


   Documented by: 


Melatonin (Melatonin 3 Mg Tab)  9 mg PO BEDTIME [facility]


   Last Admin: 01/05/22 21:10 Dose:  9 mg


   Documented by: 


Methylprednisolone Sodium Succinate (Methylprednisolone Sodium Succinate 125 

Mg/2 Ml Sdv)  125 mg IVPUSH Q8H [facility]


   Last Admin: 01/06/22 06:10 Dose:  125 mg


   Documented by: 


Morphine Sulfate (Morphine 2 Mg/Ml Syringe)  2 mg IVPUSH Q2H PRN


   PRN Reason: Pain


Nitroglycerin (Nitroglycerin 0.4 Mg Tab.Sl)  0.4 mg SL Q5M PRN


   PRN Reason: Chest Pain


Pantoprazole Sodium (Pantoprazole 40 Mg Tab.Cr)  40 mg PO DAILY [facility]


   Last Admin: 01/06/22 08:21 Dose:  40 mg


   Documented by: 


Potassium Chloride (Potassium Chloride 20 Meq Tab.Er)  40 meq PO TID [facility]


Senna/Docusate Sodium (Docusate Sodium/Sennosides 50-8.6 Mg Tab)  1 tab PO BID 

PRN


   PRN Reason: Constipation


Sodium Chloride (Sodium Chloride 0.9% 10 Ml Syringe)  10 ml FLUSH ASDIRECTED PRN


   PRN Reason: Keep Vein Open


   Last Admin: 01/06/22 06:12 Dose:  10 ml


   Documented by: 


Spironolactone (Spironolactone 50 Mg Tab)  50 mg PO BID [facility]


   Last Admin: 01/06/22 08:21 Dose:  50 mg


   Documented by: 


Tiotropium Bromide (Tiotropium Bromide 4 Gm Inhalation Spray (2.5mcg/1 Dose; 10 

Doses))  0 gm INH DAILY [facility]


   Last Admin: 01/06/22 08:22 Dose:  2 puff


   Documented by: 


Trazodone HCl (Trazodone 50 Mg Tab)  50 mg PO BEDTIME PRN


   PRN Reason: Sleep


   Last Admin: 01/05/22 21:20 Dose:  50 mg


   Documented by: 


Warfarin Sodium (Warfarin Sliding Scale)  1 each PO ASDIRECTED [facility]


Warfarin Sodium (Warfarin 2.5 Mg Tab)  2.5 mg PO ONETIME ONE


   Stop: 01/06/22 16:01





Discontinued Medications





Albuterol/Ipratropium (Albuterol/Ipratropium 3.0-0.5 Mg/3 Ml Neb Soln)  3 ml NEB

ONETIME ONE


   Stop: 01/01/22 22:14


   Last Admin: 01/01/22 22:22 Dose:  3 ml


   Documented by: 


Albuterol/Ipratropium (Albuterol/Ipratropium 3.0-0.5 Mg/3 Ml Neb Soln)  3 ml NEB

Q6H PRN


   PRN Reason: Dyspnea


Carvedilol (Carvedilol 6.25 Mg Tab)  6.25 mg PO BID [facility]


   Last Admin: 01/03/22 08:57 Dose:  Not Given


   Documented by: 


Furosemide (Furosemide 40 Mg/4 Ml Vial)  40 mg IVPUSH NOW ONE


   Stop: 01/01/22 22:14


   Last Admin: 01/01/22 22:35 Dose:  40 mg


   Documented by: 


Furosemide (Furosemide 40 Mg/4 Ml Vial)  40 mg IVPUSH DAILY [facility]


   Last Admin: 01/05/22 08:40 Dose:  40 mg


   Documented by: 


Furosemide (Furosemide 20 Mg/2 Ml Vial)  20 mg IVPUSH DAILY@1600 [facility]


   Last Admin: 01/05/22 16:06 Dose:  20 mg


   Documented by: 


Methylprednisolone Sodium Succinate (Methylprednisolone Sodium Succinate 125 

Mg/2 Ml Sdv)  125 mg IVPUSH ONETIME ONE


   Stop: 01/01/22 22:14


   Last Admin: 01/01/22 22:42 Dose:  125 mg


   Documented by: 


Metolazone (Metolazone 2.5 Mg Tab)  2.5 mg PO ONETIME ONE


   Stop: 01/01/22 22:14


   Last Admin: 01/01/22 22:22 Dose:  2.5 mg


   Documented by: 


Metolazone (Metolazone 2.5 Mg Tab)  2.5 mg PO DAILY@0800 [facility]


   Last Admin: 01/03/22 08:52 Dose:  2.5 mg


   Documented by: 


Morphine Sulfate (Morphine 2 Mg/Ml Syringe)  2 mg IVPUSH ONETIME ONE


   Stop: 01/01/22 22:14


   Last Admin: 01/01/22 22:47 Dose:  2 mg


   Documented by: 


Non-Formulary Medication (Melatonin [Melatonin])  10 mg PO BEDTIME [facility]


Potassium Chloride (Potassium Chloride 20 Meq Tab.Er)  20 meq PO BID [facility]


   Last Admin: 01/05/22 08:40 Dose:  20 meq


   Documented by: 


Potassium Chloride (Potassium Chloride 20 Meq Tab.Er)  40 meq PO BID [facility]


   Last Admin: 01/05/22 21:10 Dose:  40 meq


   Documented by: 


Warfarin Sodium (Warfarin 2.5 Mg Tab)  2.5 mg PO 1600 [facility]


   Last Admin: 01/03/22 16:34 Dose:  2.5 mg


   Documented by: 











- Exam


Quality Assessment: No: Supplemental Oxygen


General: Alert, Oriented, Cooperative


HEENT: Pupils Equal


Neck: Supple


Cardiovascular: Regular Rate


Extremities: Normal Inspection





- Patient Data


Lab Results Last 24 hrs: 


                         Laboratory Results - last 24 hr











  01/06/22 01/06/22 01/06/22 Range/Units





  06:17 06:17 06:17 


 


PT   27.1 H   (9.0-11.1)  sec


 


INR   2.68 H   (1.00-1.24)  


 


Sodium  139    (135-145)  mmol/L


 


Potassium  2.8 L*    (3.5-5.3)  mmol/L


 


Chloride  97 L    (100-110)  mmol/L


 


Carbon Dioxide  33 H    (21-32)  mmol/L


 


BUN  70 H    (7-18)  mg/dL


 


Creatinine  2.1 H*    (0.70-1.30)  mg/dL


 


Est Cr Clr Drug Dosing  23.17    mL/min


 


Estimated GFR (MDRD)  30 L    (>60)  


 


BUN/Creatinine Ratio  33.3 H    (9-20)  


 


Glucose  131 H    ()  mg/dL


 


Calcium  8.9    (8.6-10.2)  mg/dL


 


NT-Pro-B Natriuret Pep    36059 H*  (<=450)  pg/mL











Result Diagrams: 


                                 01/05/22 06:40





                                 01/06/22 06:17





Sepsis Event Note





- Evaluation


Sepsis Screening Result: No Definite Risk





- Focused Exam


Vital Signs: 


                                   Vital Signs











  Temp Temp Pulse Resp BP Pulse Ox


 


 01/06/22 08:00  98.3 F   61  19  117/59 L  97


 


 01/06/22 05:45   98.8 F  64  16  128/60  96


 


 01/06/22 00:00     16  














- Problem List & Annotations


(1) CHF exacerbation


SNOMED Code(s): 985026854, 96686554155164


   Code(s): I50.9 - HEART FAILURE, UNSPECIFIED   Status: Acute   Current Visit: 

Yes   


Qualifiers: 


   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

43 - Acute on chronic combined systolic (congestive) and diastolic (congestive) 

heart failure   





(2) Afib


SNOMED Code(s): 09558653


   Code(s): I48.91 - UNSPECIFIED ATRIAL FIBRILLATION   Status: Acute   Current 

Visit: Yes   


Qualifiers: 


   Atrial fibrillation type: paroxysmal   Qualified Code(s): I48.0 - Paroxysmal 

atrial fibrillation   





(3) Long-term (current) use of anticoagulants, INR goal 2.0-3.0


SNOMED Code(s): 820481311, 792170790


   Code(s): Z79.01 - LONG TERM (CURRENT) USE OF ANTICOAGULANTS   Status: Acute  

Current Visit: Yes   





(4) CKD (chronic kidney disease)


SNOMED Code(s): 243693394


   Code(s): N18.9 - CHRONIC KIDNEY DISEASE, UNSPECIFIED   Status: Chronic   

Current Visit: Yes   


Qualifiers: 


   Chronic kidney disease stage: stage 3 (moderate) 





(5) COPD (chronic obstructive pulmonary disease)


SNOMED Code(s): 65302113


   Code(s): J44.9 - CHRONIC OBSTRUCTIVE PULMONARY DISEASE, UNSPECIFIED   Status:

Chronic   Current Visit: Yes   


Qualifiers: 


   Chronic bronchitis type: unspecified 





(6) Elevated troponin


SNOMED Code(s): 542990902, 998550464, 402244499


   Code(s): R77.8 - OTHER SPECIFIED ABNORMALITIES OF PLASMA PROTEINS   Status: 

Acute   Current Visit: No   





(7) Non-STEMI (non-ST elevated myocardial infarction)


SNOMED Code(s): 85338085


   Code(s): I21.4 - NON-ST ELEVATION (NSTEMI) MYOCARDIAL INFARCTION   Status: 

Acute   Current Visit: No   Annotation/Comment:: Mr Manriquez will be transferred

to [facility] by ground ambulance tonight.    





(8) CAD (coronary artery disease)


SNOMED Code(s): 89872852


   Code(s): I25.10 - ATHSCL HEART DISEASE OF NATIVE CORONARY ARTERY W/O ANG 

PCTRS   Status: Chronic   Current Visit: No   


Qualifiers: 


   Coronary Disease-Associated Artery/Lesion type: bypass graft   Associated 

angina: without angina 





(9) HTN (hypertension)


SNOMED Code(s): 60080026


   Code(s): I10 - ESSENTIAL (PRIMARY) HYPERTENSION   Status: Chronic   Current 

Visit: No   


Qualifiers: 


   Hypertension type: primary hypertension   Qualified Code(s): I10 - Essential 

(primary) hypertension   





(10) Pruritus


SNOMED Code(s): 625975829


   Code(s): L29.9 - PRURITUS, UNSPECIFIED   Status: Acute   Current Visit: Yes  







- Problem List Review


Problem List Initiated/Reviewed/Updated: Yes





- My Orders


Last 24 Hours: 


My Active Orders





01/05/22 10:22


diphenhydrAMINE [Benadryl]   25 mg PO Q6H PRN 





01/05/22 12:53


PT Evaluation and Treatment [CONS] Routine 





01/05/22 12:54


OT Evaluation and Treatment [CONS] Routine 





01/06/22 09:00


Bumetanide [Bumex]   1 mg IVPUSH BIDDIURETIC 


Potassium Chloride [Klor-Con M20]   40 meq PO TID 





01/06/22 16:00


Warfarin [Coumadin]   2.5 mg PO ONETIME ONE 





01/07/22 05:11


INR,PT,PROTHROMBIN TIME [COAG] DAILY 


PRO B-TYPE NATRIUR PEPT,BNPPRO [CHEM] DAILY 





01/08/22 05:11


INR,PT,PROTHROMBIN TIME [COAG] DAILY 





01/09/22 05:11


INR,PT,PROTHROMBIN TIME [COAG] DAILY 





01/10/22 05:11


INR,PT,PROTHROMBIN TIME [COAG] DAILY 





01/11/22 05:11


INR,PT,PROTHROMBIN TIME [COAG] DAILY 














- Plan


Plan:: 


 I reviewed his chest x-ray from yesterday, and there is not much improvement f

rom the one done on the first. Creatinine is 2.1, his potassium still low at 

2.8. I have discontinued Lasix in favor of Bumex 1 mg IV twice a day. Continue 

daily weight, will probably stay with him over the weekend.

## 2022-01-07 RX ADMIN — Medication PRN ML: at 06:12

## 2022-01-07 RX ADMIN — POTASSIUM CHLORIDE SCH MEQ: 1500 TABLET, EXTENDED RELEASE ORAL at 21:16

## 2022-01-07 RX ADMIN — POTASSIUM CHLORIDE SCH MEQ: 1500 TABLET, EXTENDED RELEASE ORAL at 13:05

## 2022-01-07 RX ADMIN — SODIUM CHLORIDE SCH MG: 9 INJECTION, SOLUTION INTRAVENOUS at 06:12

## 2022-01-07 RX ADMIN — TIOTROPIUM BROMIDE INHALATION SPRAY SCH PUFF: 3.12 SPRAY, METERED RESPIRATORY (INHALATION) at 08:05

## 2022-01-07 RX ADMIN — BUMETANIDE SCH MG: 0.25 INJECTION INTRAMUSCULAR; INTRAVENOUS at 08:07

## 2022-01-07 RX ADMIN — POTASSIUM CHLORIDE SCH MEQ: 1500 TABLET, EXTENDED RELEASE ORAL at 08:04

## 2022-01-07 RX ADMIN — CYANOCOBALAMIN TAB 1000 MCG SCH MCG: 1000 TAB at 08:04

## 2022-01-07 NOTE — PCM.PN
- General Info


Date of Service: 01/07/22


Admission Dx/Problem (Free Text): 


Patient is doing well.  He denies fevers, chills, shortness of breath.  He does 

have to sleep at an angle he says is more comfortable.  He has bilateral edema 

worse on the left than the right.  No wheezing, coughing.








- Patient Data


Vitals - Most Recent: 


                                Last Vital Signs











Temp  97.7 F   01/07/22 00:00


 


Pulse  78   01/07/22 04:55


 


Resp  20   01/07/22 04:55


 


BP  126/72   01/07/22 04:55


 


Pulse Ox  97   01/07/22 04:55











Weight - Most Recent: 163 lb 7 oz


Lab Results Last 24 Hours: 


                         Laboratory Results - last 24 hr











  01/07/22 01/07/22 Range/Units





  06:30 06:30 


 


PT  23.0 H   (9.0-11.1)  sec


 


INR  2.25 H   (1.00-1.24)  


 


NT-Pro-B Natriuret Pep   27516 H*  (<=450)  pg/mL











Med Orders - Current: 


                               Current Medications





Acetaminophen (Acetaminophen 500 Mg Tab)  1,000 mg PO Q6H PRN


   PRN Reason: Pain


   Last Admin: 01/07/22 04:35 Dose:  1,000 mg


   Documented by: 


Albuterol/Ipratropium (Albuterol/Ipratropium 3.0-0.5 Mg/3 Ml Neb Soln)  3 ml INH

Q6H PRN


   PRN Reason: Shortness Of Breath/wheezing


Bumetanide (Bumetanide 2 Mg Tab)  2 mg PO BID [facility]


Cyanocobalamin (Cyanocobalamin (Vitamin B12) 1,000 Mcg Tab)  1,000 mcg PO DAILY 

[facility]


   Last Admin: 01/07/22 08:04 Dose:  1,000 mcg


   Documented by: 


Diphenhydramine HCl (Diphenhydramine 25 Mg Cap)  25 mg PO Q6H PRN


   PRN Reason: Itching


   Last Admin: 01/05/22 21:10 Dose:  25 mg


   Documented by: 


Hydrocortisone Acetate (Hydrocortisone Acetate 1% Crm 30 Gm Tube)  1 gm TOP TID 

PRN


   PRN Reason: Itching


   Last Admin: 01/06/22 20:49 Dose:  1 applic


   Documented by: 


Melatonin (Melatonin 3 Mg Tab)  9 mg PO BEDTIME [facility]


   Last Admin: 01/06/22 20:48 Dose:  9 mg


   Documented by: 


Nitroglycerin (Nitroglycerin 0.4 Mg Tab.Sl)  0.4 mg SL Q5M PRN


   PRN Reason: Chest Pain


Pantoprazole Sodium (Pantoprazole 40 Mg Tab.Cr)  40 mg PO DAILY [facility]


   Last Admin: 01/07/22 08:04 Dose:  40 mg


   Documented by: 


Potassium Chloride (Potassium Chloride 20 Meq Tab.Er)  40 meq PO TID [facility]


   Last Admin: 01/07/22 08:04 Dose:  40 meq


   Documented by: 


Prednisone (Prednisone 20 Mg Tab)  60 mg PO WITHBREAKFAST [facility]


Senna/Docusate Sodium (Docusate Sodium/Sennosides 50-8.6 Mg Tab)  1 tab PO BID 

PRN


   PRN Reason: Constipation


Sodium Chloride (Sodium Chloride 0.9% 10 Ml Syringe)  10 ml FLUSH ASDIRECTED PRN


   PRN Reason: Keep Vein Open


   Last Admin: 01/07/22 06:12 Dose:  10 ml


   Documented by: 


Spironolactone (Spironolactone 50 Mg Tab)  50 mg PO BID [facility]


   Last Admin: 01/07/22 08:04 Dose:  50 mg


   Documented by: 


Tiotropium Bromide (Tiotropium Bromide 4 Gm Inhalation Spray (2.5mcg/1 Dose; 10 

Doses))  0 gm INH DAILY [facility]


   Last Admin: 01/07/22 08:05 Dose:  2 puff


   Documented by: 


Trazodone HCl (Trazodone 50 Mg Tab)  50 mg PO BEDTIME PRN


   PRN Reason: Sleep


   Last Admin: 01/05/22 21:20 Dose:  50 mg


   Documented by: 


Warfarin Sodium (Warfarin Sliding Scale)  1 each PO ASDIRECTED [facility]





Discontinued Medications





Albuterol/Ipratropium (Albuterol/Ipratropium 3.0-0.5 Mg/3 Ml Neb Soln)  3 ml NEB

ONETIME ONE


   Stop: 01/01/22 22:14


   Last Admin: 01/01/22 22:22 Dose:  3 ml


   Documented by: 


Albuterol/Ipratropium (Albuterol/Ipratropium 3.0-0.5 Mg/3 Ml Neb Soln)  3 ml NEB

Q6H PRN


   PRN Reason: Dyspnea


Bumetanide (Bumetanide 1 Mg/4 Ml Mdv)  1 mg IVPUSH BIDDIURETIC [facility]


   Last Admin: 01/07/22 08:07 Dose:  1 mg


   Documented by: 


Carvedilol (Carvedilol 6.25 Mg Tab)  6.25 mg PO BID [facility]


   Last Admin: 01/03/22 08:57 Dose:  Not Given


   Documented by: 


Furosemide (Furosemide 40 Mg/4 Ml Vial)  40 mg IVPUSH NOW ONE


   Stop: 01/01/22 22:14


   Last Admin: 01/01/22 22:35 Dose:  40 mg


   Documented by: 


Furosemide (Furosemide 40 Mg/4 Ml Vial)  40 mg IVPUSH DAILY [facility]


   Last Admin: 01/05/22 08:40 Dose:  40 mg


   Documented by: 


Furosemide (Furosemide 20 Mg/2 Ml Vial)  20 mg IVPUSH DAILY@1600 [facility]


   Last Admin: 01/05/22 16:06 Dose:  20 mg


   Documented by: 


Methylprednisolone Sodium Succinate (Methylprednisolone Sodium Succinate 125 

Mg/2 Ml Sdv)  125 mg IVPUSH ONETIME ONE


   Stop: 01/01/22 22:14


   Last Admin: 01/01/22 22:42 Dose:  125 mg


   Documented by: 


Methylprednisolone Sodium Succinate (Methylprednisolone Sodium Succinate 125 

Mg/2 Ml Sdv)  125 mg IVPUSH Q8H [facility]


   Last Admin: 01/07/22 06:12 Dose:  125 mg


   Documented by: 


Metolazone (Metolazone 2.5 Mg Tab)  2.5 mg PO ONETIME ONE


   Stop: 01/01/22 22:14


   Last Admin: 01/01/22 22:22 Dose:  2.5 mg


   Documented by: 


Metolazone (Metolazone 2.5 Mg Tab)  2.5 mg PO DAILY@0800 [facility]


   Last Admin: 01/03/22 08:52 Dose:  2.5 mg


   Documented by: 


Morphine Sulfate (Morphine 2 Mg/Ml Syringe)  2 mg IVPUSH ONETIME ONE


   Stop: 01/01/22 22:14


   Last Admin: 01/01/22 22:47 Dose:  2 mg


   Documented by: 


Morphine Sulfate (Morphine 2 Mg/Ml Syringe)  2 mg IVPUSH Q2H PRN


   PRN Reason: Pain


Non-Formulary Medication (Melatonin [Melatonin])  10 mg PO BEDTIME [facility]


Potassium Chloride (Potassium Chloride 20 Meq Tab.Er)  20 meq PO BID [facility]


   Last Admin: 01/05/22 08:40 Dose:  20 meq


   Documented by: 


Potassium Chloride (Potassium Chloride 20 Meq Tab.Er)  40 meq PO BID [facility]


   Last Admin: 01/05/22 21:10 Dose:  40 meq


   Documented by: 


Warfarin Sodium (Warfarin 2.5 Mg Tab)  2.5 mg PO 1600 [facility]


   Last Admin: 01/03/22 16:34 Dose:  2.5 mg


   Documented by: 


Warfarin Sodium (Warfarin 2.5 Mg Tab)  2.5 mg PO ONETIME ONE


   Stop: 01/06/22 16:01


   Last Admin: 01/06/22 15:47 Dose:  2.5 mg


   Documented by: 











- Exam


General: Alert, Oriented, Cooperative


Neck: Supple


Lungs: Clear to Auscultation, Normal Respiratory Effort, Decreased Breath Sounds


Cardiovascular: Regular Rate, Regular Rhythm, No Murmurs


Extremities: Pedal Edema





- Patient Data


Lab Results Last 24 hrs: 


                         Laboratory Results - last 24 hr











  01/07/22 01/07/22 Range/Units





  06:30 06:30 


 


PT  23.0 H   (9.0-11.1)  sec


 


INR  2.25 H   (1.00-1.24)  


 


NT-Pro-B Natriuret Pep   13339 H*  (<=450)  pg/mL











Result Diagrams: 


                                 01/05/22 06:40





                                 01/06/22 06:17





Sepsis Event Note





- Evaluation


Sepsis Screening Result: No Definite Risk





- Focused Exam


Vital Signs: 


                                   Vital Signs











  Temp Pulse Resp BP Pulse Ox


 


 01/07/22 04:55   78  20  126/72  97


 


 01/07/22 00:00  97.7 F  72  20  118/60  96














- Problem List & Annotations


(1) Coronary artery disease


SNOMED Code(s): 08469571


   Code(s): I25.10 - ATHSCL HEART DISEASE OF NATIVE CORONARY ARTERY W/O ANG 

PCTRS   Status: Acute   Current Visit: Yes   





(2) CAITY (acute kidney injury)


SNOMED Code(s): 05710840, 75506009


   Code(s): N17.9 - ACUTE KIDNEY FAILURE, UNSPECIFIED   Status: Acute   Current 

Visit: Yes   





(3) Afib


SNOMED Code(s): 04944528


   Code(s): I48.91 - UNSPECIFIED ATRIAL FIBRILLATION   Status: Acute   Current 

Visit: Yes   


Qualifiers: 


   Atrial fibrillation type: paroxysmal   Qualified Code(s): I48.0 - Paroxysmal 

atrial fibrillation   





(4) CHF exacerbation


SNOMED Code(s): 448159491, 53566737898373


   Code(s): I50.9 - HEART FAILURE, UNSPECIFIED   Status: Acute   Current Visit: 

Yes   


Qualifiers: 


   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 

(congestive) heart failure   





(5) CKD (chronic kidney disease)


SNOMED Code(s): 070142271


   Code(s): N18.9 - CHRONIC KIDNEY DISEASE, UNSPECIFIED   Status: Chronic   

Current Visit: Yes   


Qualifiers: 


   Chronic kidney disease stage: stage 3 (moderate) 





(6) COPD (chronic obstructive pulmonary disease)


SNOMED Code(s): 46970951


   Code(s): J44.9 - CHRONIC OBSTRUCTIVE PULMONARY DISEASE, UNSPECIFIED   Status:

Chronic   Current Visit: Yes   


Qualifiers: 


   Chronic bronchitis type: unspecified 





(7) Elevated troponin


SNOMED Code(s): 340620656, 524297165, 714792529


   Code(s): R77.8 - OTHER SPECIFIED ABNORMALITIES OF PLASMA PROTEINS   Status: 

Acute   Current Visit: No   





(8) Encounter for palliative care


SNOMED Code(s): 171131969, 902466220


   Code(s): Z51.5 - ENCOUNTER FOR PALLIATIVE CARE   Status: Acute   Current 

Visit: No   





(9) Non-STEMI (non-ST elevated myocardial infarction)


SNOMED Code(s): 55334904


   Code(s): I21.4 - NON-ST ELEVATION (NSTEMI) MYOCARDIAL INFARCTION   Status: Ac

mary   Current Visit: No   Annotation/Comment:: Mr Manriquez will be transferred 

to [facility] by ground ambulance tonight.    





(10) HTN (hypertension)


SNOMED Code(s): 32916441


   Code(s): I10 - ESSENTIAL (PRIMARY) HYPERTENSION   Status: Chronic   Current 

Visit: No   


Qualifiers: 


   Hypertension type: primary hypertension   Qualified Code(s): I10 - Essential 

(primary) hypertension   





- Problem List Review


Problem List Initiated/Reviewed/Updated: Yes





- My Orders


Last 24 Hours: 


My Active Orders





01/07/22 Lunch


Low Salt [Sodium Restricted Diet] [DIET] 





01/07/22 21:00


Bumetanide [Bumex]   2 mg PO BID 





01/08/22 08:00


predniSONE   60 mg PO WITHBREAKFAST 














- Plan


Plan:: 


1.  DC morphine, Solu-Medrol, IV Buprenex, cardiac diet


2.  Start low-sodium diet.


3.  MARIBELL hose


4.  Bumex 2 mg p.o. twice daily


5.  Prednisone 60 mg a day.


6.  Continue PT/OT.


7.  Continue daily weights and I's and O's.


8.  Ambulate frequently

## 2022-01-08 VITALS — SYSTOLIC BLOOD PRESSURE: 124 MMHG | DIASTOLIC BLOOD PRESSURE: 58 MMHG | HEART RATE: 60 BPM

## 2022-01-08 RX ADMIN — POTASSIUM CHLORIDE SCH: 1500 TABLET, EXTENDED RELEASE ORAL at 10:42

## 2022-01-08 RX ADMIN — TIOTROPIUM BROMIDE INHALATION SPRAY SCH PUFF: 3.12 SPRAY, METERED RESPIRATORY (INHALATION) at 09:07

## 2022-01-08 RX ADMIN — CYANOCOBALAMIN TAB 1000 MCG SCH MCG: 1000 TAB at 09:07

## 2022-01-08 NOTE — [id number]
**Discharge Summary





- Hospital Course


Free Text/Narrative:: 


Hospital course-patient was placed on IV Lasix, Medrol IV. And he had a good 

diuresis. Legs remain his swelling especially the left. He was switched to p.o. 

and then back to IV Bumex. Switch him over to 2 mg twice daily p.o. when he did 

fine. Hemoglobin is low from 8.8 went to 9.8. Creatinine was 2.7 when he came in

1.7 when the discharge. BNP was 18,000 when he came in and then went up and then

went down to 21,000. Patient's breathing was well he had no fevers or chills. We

did use MARIBELL hose. Spironolactone 50 mg twice daily was added on to his regiment.

INR was between 2-3 pharmacy dose his Coumadin. Patient will go home on home 

health. Should consider CHF clinic.





Brief History: Pranay is an 87-year-old male from [facility]. He presented last 

night with congestive heart failure. His symptoms included dyspnea, even at 

rest, leg swelling, orthopnea lasting 2-3 days. He does have a history of 

ischemic cardiomyopathy, coronary disease and a remote history of COPD.He has 

CKD,with a baseline creatinine of 1.9. He denies fever,chills,cough or chest 

pain.


Diagnosis: Stroke: No





- Discharge Data


Discharge Date: 01/08/22


Discharge Disposition: Home, W [facility] Health Agency [facility]


Condition: Good





- Referral to Home Health


Date of Face to Face Encounter: 01/08/22


Reason for Homebound Status: CHF exacerbation


Primary Care Physician: 


PCP None





Skilled Need: mdication management, home safety, daily weights





- Discharge Diagnosis/Problem(s)


(1) Coronary artery disease


SNOMED Code(s): 58003062


   ICD Code: I25.10 - ATHSCL HEART DISEASE OF NATIVE CORONARY ARTERY W/O ANG 

PCTRS   Status: Acute   Current Visit: Yes   





(2) CAITY (acute kidney injury)


SNOMED Code(s): 00560597, 78930980


   ICD Code: N17.9 - ACUTE KIDNEY FAILURE, UNSPECIFIED   Status: Acute   Current

Visit: Yes   





(3) Afib


SNOMED Code(s): 55605656


   ICD Code: I48.91 - UNSPECIFIED ATRIAL FIBRILLATION   Status: Acute   Current 

Visit: Yes   


Qualifiers: 


   Atrial fibrillation type: paroxysmal   Qualified Code(s): I48.0 - Paroxysmal 

atrial fibrillation   





(4) CHF exacerbation


SNOMED Code(s): 976980331, 29000211634564


   ICD Code: I50.9 - HEART FAILURE, UNSPECIFIED   Status: Acute   Current Visit:

Yes   


Qualifiers: 


   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 

(congestive) heart failure   





(5) CKD (chronic kidney disease)


SNOMED Code(s): 019646115


   ICD Code: N18.9 - CHRONIC KIDNEY DISEASE, UNSPECIFIED   Status: Chronic   

Current Visit: Yes   


Qualifiers: 


   Chronic kidney disease stage: stage 3 (moderate) 





(6) COPD (chronic obstructive pulmonary disease)


SNOMED Code(s): 40028837


   ICD Code: J44.9 - CHRONIC OBSTRUCTIVE PULMONARY DISEASE, UNSPECIFIED   

Status: Chronic   Current Visit: Yes   


Qualifiers: 


   Chronic bronchitis type: unspecified 





(7) Elevated troponin


SNOMED Code(s): 107647718, 327330818, 299682040


   ICD Code: R77.8 - OTHER SPECIFIED ABNORMALITIES OF PLASMA PROTEINS   Status: 

Acute   Current Visit: No   





(8) Encounter for palliative care


SNOMED Code(s): 043785819, 616284103


   ICD Code: Z51.5 - ENCOUNTER FOR PALLIATIVE CARE   Status: Acute   Current 

Visit: No   





(9) Non-STEMI (non-ST elevated myocardial infarction)


SNOMED Code(s): 70544911


   ICD Code: I21.4 - NON-ST ELEVATION (NSTEMI) MYOCARDIAL INFARCTION   Status: 

Acute   Current Visit: No   Problem Details: Mr Manriquez will be transferred to 

[facility] by ground ambulance tonight.    





(10) HTN (hypertension)


SNOMED Code(s): 92509960


   ICD Code: I10 - ESSENTIAL (PRIMARY) HYPERTENSION   Status: Chronic   Current 

Visit: No   


Qualifiers: 


   Hypertension type: primary hypertension   Qualified Code(s): I10 - Essential 

(primary) hypertension   





- Patient Summary/Data


Consults: 


                                  Consultations





01/05/22 12:53


PT Evaluation and Treatment [CONS] Routine 


   Please Evaluate and Treat.


   PT Reason for Consult: Ambulation


   This query below is only for informational purposes and is not editable.


   Admission Diagnosis/Problem: CHF, Congestive heart failure





01/05/22 12:54


OT Evaluation and Treatment [CONS] Routine 


   Please Evaluate and Treat.


   OT Reason for Consult: ADL's


   This query below is only for informational purposes and is not editable.


   Admission Diagnosis/Problem: CHF, Congestive heart failure














- Patient Instructions


Diet: Low Sodium


Activity: As Tolerated


Driving: May Drive Today


Showering/Bathing: May Shower


Notify Provider of: Swelling and Redness


Other/Special Instructions: 1. Recheck with Dr. Alicia in 1 week.  2. INR in 2 

days 1/10/2022.  3. BMP in 1 week with appointment Dr. Alicia.





- Discharge Plan


Prescriptions/Med Rec: 


Spironolactone [Aldactone] 50 mg PO BID #60 tablet


Bumetanide [Bumex] 2 mg PO BIDDIURETIC #60 tablet


predniSONE 40 mg PO WITHBREAKFAST #5 tablet


Home Medications: 


                                    Home Meds





Nitroglycerin [Nitrostat] 0.4 mg SL Q5M PRN 11/27/18 [History]


Pantoprazole Sodium [Protonix] 40 mg PO DAILY 06/21/21 [History]


carvediloL [Coreg] 6.25 mg PO BID 06/21/21 [History]


Acetaminophen [Acetaminophen Extra Strength] 1,000 mg PO Q6H PRN 09/28/21 

[History]


Amoxicillin 2,000 mg PO ASDIRECTED PRN 09/28/21 [History]


Cyanocobalamin (Vitamin B-12) [B-12] 1,000 mcg PO DAILY 09/28/21 [History]


traZODone 50 mg PO BEDTIME PRN 09/28/21 [History]


Hydrocortisone [Hydrocortisone 2.5% Crm] 0 gm TOP BID PRN  tube 11/15/21 [Rx]


Potassium Chloride [Klor-Con M20] 20 meq PO BID #60 tab.er 11/15/21 [Rx]


metOLazone [Zaroxolyn] 2.5 mg PO DAILY PRN #15 tablet 11/15/21 [Rx]


Melatonin 10 mg PO BEDTIME 01/02/22 [History]


Tiotropium [Spiriva HandiHaler] 1 puff IH DAILY 01/02/22 [History]


Warfarin [Coumadin] 2.5 mg PO 1600 01/02/22 [History]


Bumetanide [Bumex] 2 mg PO BIDDIURETIC #60 tablet 01/08/22 [Rx]


Spironolactone [Aldactone] 50 mg PO BID #60 tablet 01/08/22 [Rx]


predniSONE 40 mg PO WITHBREAKFAST #5 tablet 01/08/22 [Rx]








Patient Handouts:  Fall Prevention in Hospitals, Adult, Heart Failure 

Exacerbation, Venous Thromboembolism Prevention


Forms:  ED Department Discharge


Referrals: 


PCP,None [Primary Care Provider] - 





- Discharge Summary/Plan Comment


DC Time >30 min.: No


Total # of Minutes for Discharge Time: 


15 minutes








- Patient Data


Vitals - Most Recent: 


                                Last Vital Signs











Temp  97.9 F   01/08/22 01:00


 


Pulse  68   01/08/22 01:00


 


Resp  16   01/08/22 01:00


 


BP  118/57 L  01/08/22 01:00


 


Pulse Ox  95   01/08/22 01:00











Weight - Most Recent: 163 lb 5 oz


I&O - Last 24 hours: 


                                 Intake & Output











 01/07/22 01/08/22 01/08/22





 22:59 06:59 14:59


 


Output Total  1100 


 


Balance  -1100 











Lab Results - Last 24 hrs: 


                         Laboratory Results - last 24 hr











  01/08/22 01/08/22 01/08/22 Range/Units





  06:25 06:25 06:25 


 


WBC   11.2 H   (3.2-10.1)  x10-3/uL


 


RBC   3.87 L   (3.90-5.90)  x10(6)uL


 


Hgb   9.8 L   (12.9-17.7)  g/dL


 


Hct   31.3 L   (38.3-50.1)  %


 


MCV   80.9   (80.8-98.7)  fL


 


MCH   25.3 L   (27.0-33.3)  pg


 


MCHC   31.3   (28.7-35.3)  g/dL


 


RDW   18.1 H   (12.4-15.0)  %


 


Plt Count   198   (117-477)  x10(3)uL


 


MPV   8.4   (6.7-11.0)  fL


 


Add Manual Diff   Yes   


 


Neutrophils % (Manual)   87 H   (46-82)  %


 


Band Neutrophils %   4   (0-6)  %


 


Lymphocytes % (Manual)   4 L   (13-37)  %


 


Monocytes % (Manual)   5   (4-12)  %


 


PT  23.4 H    (9.0-11.1)  sec


 


INR  2.29 H    (1.00-1.24)  


 


Sodium    137  (135-145)  mmol/L


 


Potassium    4.5  D  (3.5-5.3)  mmol/L


 


Chloride    100  (100-110)  mmol/L


 


Carbon Dioxide    32  (21-32)  mmol/L


 


BUN    69 H  (7-18)  mg/dL


 


Creatinine    1.7 H  (0.70-1.30)  mg/dL


 


Est Cr Clr Drug Dosing    28.62  mL/min


 


Estimated GFR (MDRD)    38 L  (>60)  


 


BUN/Creatinine Ratio    40.6 H  (9-20)  


 


Glucose    107  ()  mg/dL


 


Calcium    8.8  (8.6-10.2)  mg/dL


 


NT-Pro-B Natriuret Pep     (<=450)  pg/mL














  01/08/22 Range/Units





  06:25 


 


WBC   (3.2-10.1)  x10-3/uL


 


RBC   (3.90-5.90)  x10(6)uL


 


Hgb   (12.9-17.7)  g/dL


 


Hct   (38.3-50.1)  %


 


MCV   (80.8-98.7)  fL


 


MCH   (27.0-33.3)  pg


 


MCHC   (28.7-35.3)  g/dL


 


RDW   (12.4-15.0)  %


 


Plt Count   (117-477)  x10(3)uL


 


MPV   (6.7-11.0)  fL


 


Add Manual Diff   


 


Neutrophils % (Manual)   (46-82)  %


 


Band Neutrophils %   (0-6)  %


 


Lymphocytes % (Manual)   (13-37)  %


 


Monocytes % (Manual)   (4-12)  %


 


PT   (9.0-11.1)  sec


 


INR   (1.00-1.24)  


 


Sodium   (135-145)  mmol/L


 


Potassium   (3.5-5.3)  mmol/L


 


Chloride   (100-110)  mmol/L


 


Carbon Dioxide   (21-32)  mmol/L


 


BUN   (7-18)  mg/dL


 


Creatinine   (0.70-1.30)  mg/dL


 


Est Cr Clr Drug Dosing   mL/min


 


Estimated GFR (MDRD)   (>60)  


 


BUN/Creatinine Ratio   (9-20)  


 


Glucose   ()  mg/dL


 


Calcium   (8.6-10.2)  mg/dL


 


NT-Pro-B Natriuret Pep  42289 H*  (<=450)  pg/mL











Med Orders - Current: 


                               Current Medications





Acetaminophen (Acetaminophen 500 Mg Tab)  1,000 mg PO Q6H PRN


   PRN Reason: Pain


   Last Admin: 01/07/22 21:24 Dose:  1,000 mg


   Documented by: 


Albuterol/Ipratropium (Albuterol/Ipratropium 3.0-0.5 Mg/3 Ml Neb Soln)  3 ml INH

Q6H PRN


   PRN Reason: Shortness Of Breath/wheezing


Bumetanide (Bumetanide 2 Mg Tab)  2 mg PO BIDDIURETIC [facility]


   Last Admin: 01/08/22 09:06 Dose:  2 mg


   Documented by: 


Cyanocobalamin (Cyanocobalamin (Vitamin B12) 1,000 Mcg Tab)  1,000 mcg PO DAILY 

[facility]


   Last Admin: 01/08/22 09:07 Dose:  1,000 mcg


   Documented by: 


Diphenhydramine HCl (Diphenhydramine 25 Mg Cap)  25 mg PO Q6H PRN


   PRN Reason: Itching


   Last Admin: 01/05/22 21:10 Dose:  25 mg


   Documented by: 


Hydrocortisone Acetate (Hydrocortisone Acetate 1% Crm 30 Gm Tube)  1 gm TOP TID 

PRN


   PRN Reason: Itching


   Last Admin: 01/06/22 20:49 Dose:  1 applic


   Documented by: 


Melatonin (Melatonin 3 Mg Tab)  9 mg PO BEDTIME [facility]


   Last Admin: 01/07/22 21:17 Dose:  9 mg


   Documented by: 


Nitroglycerin (Nitroglycerin 0.4 Mg Tab.Sl)  0.4 mg SL Q5M PRN


   PRN Reason: Chest Pain


Pantoprazole Sodium (Pantoprazole 40 Mg Tab.Cr)  40 mg PO DAILY [facility]


   Last Admin: 01/08/22 09:07 Dose:  40 mg


   Documented by: 


Potassium Chloride (Potassium Chloride 20 Meq Tab.Er)  40 meq PO BIDMEALS [facility]


Prednisone (Prednisone 20 Mg Tab)  60 mg PO WITHBREAKFAST [facility]


   Last Admin: 01/08/22 09:06 Dose:  60 mg


   Documented by: 


Senna/Docusate Sodium (Docusate Sodium/Sennosides 50-8.6 Mg Tab)  1 tab PO BID 

PRN


   PRN Reason: Constipation


Sodium Chloride (Sodium Chloride 0.9% 10 Ml Syringe)  10 ml FLUSH ASDIRECTED PRN


   PRN Reason: Keep Vein Open


   Last Admin: 01/07/22 06:12 Dose:  10 ml


   Documented by: 


Spironolactone (Spironolactone 50 Mg Tab)  50 mg PO BID [facility]


   Last Admin: 01/08/22 09:06 Dose:  50 mg


   Documented by: 


Tiotropium Bromide (Tiotropium Bromide 4 Gm Inhalation Spray (2.5mcg/1 Dose; 10 

Doses))  0 gm INH DAILY [facility]


   Last Admin: 01/08/22 09:07 Dose:  2 puff


   Documented by: 


Trazodone HCl (Trazodone 50 Mg Tab)  50 mg PO BEDTIME PRN


   PRN Reason: Sleep


   Last Admin: 01/07/22 21:24 Dose:  50 mg


   Documented by: 


Warfarin Sodium (Warfarin Sliding Scale)  1 each PO ASDIRECTED [facility]


Warfarin Sodium (Warfarin 2.5 Mg Tab)  1.25 mg PO MoFr@1600 [facility]


   Last Admin: 01/07/22 15:58 Dose:  1.25 mg


   Documented by: 


Warfarin Sodium (Warfarin 2.5 Mg Tab)  2.5 mg PO SuTuWeThSa@1600 [facility]





Discontinued Medications





Albuterol/Ipratropium (Albuterol/Ipratropium 3.0-0.5 Mg/3 Ml Neb Soln)  3 ml NEB

ONETIME ONE


   Stop: 01/01/22 22:14


   Last Admin: 01/01/22 22:22 Dose:  3 ml


   Documented by: 


Albuterol/Ipratropium (Albuterol/Ipratropium 3.0-0.5 Mg/3 Ml Neb Soln)  3 ml NEB

Q6H PRN


   PRN Reason: Dyspnea


Bumetanide (Bumetanide 1 Mg/4 Ml Mdv)  1 mg IVPUSH BIDDIURETIC [facility]


   Last Admin: 01/07/22 08:07 Dose:  1 mg


   Documented by: 


Carvedilol (Carvedilol 6.25 Mg Tab)  6.25 mg PO BID [facility]


   Last Admin: 01/03/22 08:57 Dose:  Not Given


   Documented by: 


Furosemide (Furosemide 40 Mg/4 Ml Vial)  40 mg IVPUSH NOW ONE


   Stop: 01/01/22 22:14


   Last Admin: 01/01/22 22:35 Dose:  40 mg


   Documented by: 


Furosemide (Furosemide 40 Mg/4 Ml Vial)  40 mg IVPUSH DAILY [facility]


   Last Admin: 01/05/22 08:40 Dose:  40 mg


   Documented by: 


Furosemide (Furosemide 20 Mg/2 Ml Vial)  20 mg IVPUSH DAILY@1600 [facility]


   Last Admin: 01/05/22 16:06 Dose:  20 mg


   Documented by: 


Methylprednisolone Sodium Succinate (Methylprednisolone Sodium Succinate 125 

Mg/2 Ml Sdv)  125 mg IVPUSH ONETIME ONE


   Stop: 01/01/22 22:14


   Last Admin: 01/01/22 22:42 Dose:  125 mg


   Documented by: 


Methylprednisolone Sodium Succinate (Methylprednisolone Sodium Succinate 125 

Mg/2 Ml Sdv)  125 mg IVPUSH Q8H [facility]


   Last Admin: 01/07/22 06:12 Dose:  125 mg


   Documented by: 


Metolazone (Metolazone 2.5 Mg Tab)  2.5 mg PO ONETIME ONE


   Stop: 01/01/22 22:14


   Last Admin: 01/01/22 22:22 Dose:  2.5 mg


   Documented by: 


Metolazone (Metolazone 2.5 Mg Tab)  2.5 mg PO DAILY@0800 [facility]


   Last Admin: 01/03/22 08:52 Dose:  2.5 mg


   Documented by: 


Morphine Sulfate (Morphine 2 Mg/Ml Syringe)  2 mg IVPUSH ONETIME ONE


   Stop: 01/01/22 22:14


   Last Admin: 01/01/22 22:47 Dose:  2 mg


   Documented by: 


Morphine Sulfate (Morphine 2 Mg/Ml Syringe)  2 mg IVPUSH Q2H PRN


   PRN Reason: Pain


Non-Formulary Medication (Melatonin [Melatonin])  10 mg PO BEDTIME [facility]


Potassium Chloride (Potassium Chloride 20 Meq Tab.Er)  20 meq PO BID [facility]


   Last Admin: 01/05/22 08:40 Dose:  20 meq


   Documented by: 


Potassium Chloride (Potassium Chloride 20 Meq Tab.Er)  40 meq PO BID [facility]


   Last Admin: 01/05/22 21:10 Dose:  40 meq


   Documented by: 


Potassium Chloride (Potassium Chloride 20 Meq Tab.Er)  40 meq PO TID [facility]


   Last Admin: 01/07/22 21:16 Dose:  40 meq


   Documented by: 


Potassium Chloride (Potassium Chloride 20 Meq Tab.Er)  40 meq PO BIDMEALS [facility]


Warfarin Sodium (Warfarin 2.5 Mg Tab)  2.5 mg PO 1600 [facility]


   Last Admin: 01/03/22 16:34 Dose:  2.5 mg


   Documented by: 


Warfarin Sodium (Warfarin 2.5 Mg Tab)  2.5 mg PO ONETIME ONE


   Stop: 01/06/22 16:01


   Last Admin: 01/06/22 15:47 Dose:  2.5 mg


   Documented by:

## 2022-01-08 NOTE — PCM.PN
- General Info


Date of Service: 01/08/22


Admission Dx/Problem (Free Text): 


Patient is doing well. Swelling is a bit better and his leg. We will short of 

breath occasionally. No chest pain, fevers, chills.








- Patient Data


Vitals - Most Recent: 


                                Last Vital Signs











Temp  97.9 F   01/08/22 01:00


 


Pulse  68   01/08/22 01:00


 


Resp  16   01/08/22 01:00


 


BP  118/57 L  01/08/22 01:00


 


Pulse Ox  95   01/08/22 01:00











Weight - Most Recent: 163 lb 5 oz


I&O - Last 24 Hours: 


                                 Intake & Output











 01/07/22 01/08/22 01/08/22





 22:59 06:59 14:59


 


Output Total  1100 


 


Balance  -1100 











Lab Results Last 24 Hours: 


                         Laboratory Results - last 24 hr











  01/08/22 01/08/22 01/08/22 Range/Units





  06:25 06:25 06:25 


 


WBC   11.2 H   (3.2-10.1)  x10-3/uL


 


RBC   3.87 L   (3.90-5.90)  x10(6)uL


 


Hgb   9.8 L   (12.9-17.7)  g/dL


 


Hct   31.3 L   (38.3-50.1)  %


 


MCV   80.9   (80.8-98.7)  fL


 


MCH   25.3 L   (27.0-33.3)  pg


 


MCHC   31.3   (28.7-35.3)  g/dL


 


RDW   18.1 H   (12.4-15.0)  %


 


Plt Count   198   (117-477)  x10(3)uL


 


MPV   8.4   (6.7-11.0)  fL


 


Add Manual Diff   Yes   


 


Neutrophils % (Manual)   87 H   (46-82)  %


 


Band Neutrophils %   4   (0-6)  %


 


Lymphocytes % (Manual)   4 L   (13-37)  %


 


Monocytes % (Manual)   5   (4-12)  %


 


PT  23.4 H    (9.0-11.1)  sec


 


INR  2.29 H    (1.00-1.24)  


 


Sodium    137  (135-145)  mmol/L


 


Potassium    4.5  D  (3.5-5.3)  mmol/L


 


Chloride    100  (100-110)  mmol/L


 


Carbon Dioxide    32  (21-32)  mmol/L


 


BUN    69 H  (7-18)  mg/dL


 


Creatinine    1.7 H  (0.70-1.30)  mg/dL


 


Est Cr Clr Drug Dosing    28.62  mL/min


 


Estimated GFR (MDRD)    38 L  (>60)  


 


BUN/Creatinine Ratio    40.6 H  (9-20)  


 


Glucose    107  ()  mg/dL


 


Calcium    8.8  (8.6-10.2)  mg/dL


 


NT-Pro-B Natriuret Pep     (<=450)  pg/mL














  01/08/22 Range/Units





  06:25 


 


WBC   (3.2-10.1)  x10-3/uL


 


RBC   (3.90-5.90)  x10(6)uL


 


Hgb   (12.9-17.7)  g/dL


 


Hct   (38.3-50.1)  %


 


MCV   (80.8-98.7)  fL


 


MCH   (27.0-33.3)  pg


 


MCHC   (28.7-35.3)  g/dL


 


RDW   (12.4-15.0)  %


 


Plt Count   (117-477)  x10(3)uL


 


MPV   (6.7-11.0)  fL


 


Add Manual Diff   


 


Neutrophils % (Manual)   (46-82)  %


 


Band Neutrophils %   (0-6)  %


 


Lymphocytes % (Manual)   (13-37)  %


 


Monocytes % (Manual)   (4-12)  %


 


PT   (9.0-11.1)  sec


 


INR   (1.00-1.24)  


 


Sodium   (135-145)  mmol/L


 


Potassium   (3.5-5.3)  mmol/L


 


Chloride   (100-110)  mmol/L


 


Carbon Dioxide   (21-32)  mmol/L


 


BUN   (7-18)  mg/dL


 


Creatinine   (0.70-1.30)  mg/dL


 


Est Cr Clr Drug Dosing   mL/min


 


Estimated GFR (MDRD)   (>60)  


 


BUN/Creatinine Ratio   (9-20)  


 


Glucose   ()  mg/dL


 


Calcium   (8.6-10.2)  mg/dL


 


NT-Pro-B Natriuret Pep  32598 H*  (<=450)  pg/mL











Med Orders - Current: 


                               Current Medications





Acetaminophen (Acetaminophen 500 Mg Tab)  1,000 mg PO Q6H PRN


   PRN Reason: Pain


   Last Admin: 01/07/22 21:24 Dose:  1,000 mg


   Documented by: 


Albuterol/Ipratropium (Albuterol/Ipratropium 3.0-0.5 Mg/3 Ml Neb Soln)  3 ml INH

Q6H PRN


   PRN Reason: Shortness Of Breath/wheezing


Bumetanide (Bumetanide 2 Mg Tab)  2 mg PO BIDDIURETIC [facility]


   Last Admin: 01/07/22 13:05 Dose:  2 mg


   Documented by: 


Cyanocobalamin (Cyanocobalamin (Vitamin B12) 1,000 Mcg Tab)  1,000 mcg PO DAILY 

[facility]


   Last Admin: 01/07/22 08:04 Dose:  1,000 mcg


   Documented by: 


Diphenhydramine HCl (Diphenhydramine 25 Mg Cap)  25 mg PO Q6H PRN


   PRN Reason: Itching


   Last Admin: 01/05/22 21:10 Dose:  25 mg


   Documented by: 


Hydrocortisone Acetate (Hydrocortisone Acetate 1% Crm 30 Gm Tube)  1 gm TOP TID 

PRN


   PRN Reason: Itching


   Last Admin: 01/06/22 20:49 Dose:  1 applic


   Documented by: 


Melatonin (Melatonin 3 Mg Tab)  9 mg PO BEDTIME [facility]


   Last Admin: 01/07/22 21:17 Dose:  9 mg


   Documented by: 


Nitroglycerin (Nitroglycerin 0.4 Mg Tab.Sl)  0.4 mg SL Q5M PRN


   PRN Reason: Chest Pain


Pantoprazole Sodium (Pantoprazole 40 Mg Tab.Cr)  40 mg PO DAILY [facility]


   Last Admin: 01/07/22 08:04 Dose:  40 mg


   Documented by: 


Potassium Chloride (Potassium Chloride 20 Meq Tab.Er)  40 meq PO TID [facility]


   Last Admin: 01/07/22 21:16 Dose:  40 meq


   Documented by: 


Prednisone (Prednisone 20 Mg Tab)  60 mg PO WITHBREAKFAST [facility]


Senna/Docusate Sodium (Docusate Sodium/Sennosides 50-8.6 Mg Tab)  1 tab PO BID 

PRN


   PRN Reason: Constipation


Sodium Chloride (Sodium Chloride 0.9% 10 Ml Syringe)  10 ml FLUSH ASDIRECTED PRN


   PRN Reason: Keep Vein Open


   Last Admin: 01/07/22 06:12 Dose:  10 ml


   Documented by: 


Spironolactone (Spironolactone 50 Mg Tab)  50 mg PO BID [facility]


   Last Admin: 01/07/22 21:16 Dose:  50 mg


   Documented by: 


Tiotropium Bromide (Tiotropium Bromide 4 Gm Inhalation Spray (2.5mcg/1 Dose; 10 

Doses))  0 gm INH DAILY [facility]


   Last Admin: 01/07/22 08:05 Dose:  2 puff


   Documented by: 


Trazodone HCl (Trazodone 50 Mg Tab)  50 mg PO BEDTIME PRN


   PRN Reason: Sleep


   Last Admin: 01/07/22 21:24 Dose:  50 mg


   Documented by: 


Warfarin Sodium (Warfarin Sliding Scale)  1 each PO ASDIRECTED [facility]


Warfarin Sodium (Warfarin 2.5 Mg Tab)  1.25 mg PO MoFr@1600 [facility]


   Last Admin: 01/07/22 15:58 Dose:  1.25 mg


   Documented by: 


Warfarin Sodium (Warfarin 2.5 Mg Tab)  2.5 mg PO SuTuWeThSa@1600 [facility]





Discontinued Medications





Albuterol/Ipratropium (Albuterol/Ipratropium 3.0-0.5 Mg/3 Ml Neb Soln)  3 ml NEB

ONETIME ONE


   Stop: 01/01/22 22:14


   Last Admin: 01/01/22 22:22 Dose:  3 ml


   Documented by: 


Albuterol/Ipratropium (Albuterol/Ipratropium 3.0-0.5 Mg/3 Ml Neb Soln)  3 ml NEB

Q6H PRN


   PRN Reason: Dyspnea


Bumetanide (Bumetanide 1 Mg/4 Ml Mdv)  1 mg IVPUSH BIDDIURETIC [facility]


   Last Admin: 01/07/22 08:07 Dose:  1 mg


   Documented by: 


Carvedilol (Carvedilol 6.25 Mg Tab)  6.25 mg PO BID [facility]


   Last Admin: 01/03/22 08:57 Dose:  Not Given


   Documented by: 


Furosemide (Furosemide 40 Mg/4 Ml Vial)  40 mg IVPUSH NOW ONE


   Stop: 01/01/22 22:14


   Last Admin: 01/01/22 22:35 Dose:  40 mg


   Documented by: 


Furosemide (Furosemide 40 Mg/4 Ml Vial)  40 mg IVPUSH DAILY [facility]


   Last Admin: 01/05/22 08:40 Dose:  40 mg


   Documented by: 


Furosemide (Furosemide 20 Mg/2 Ml Vial)  20 mg IVPUSH DAILY@1600 [facility]


   Last Admin: 01/05/22 16:06 Dose:  20 mg


   Documented by: 


Methylprednisolone Sodium Succinate (Methylprednisolone Sodium Succinate 125 

Mg/2 Ml Sdv)  125 mg IVPUSH ONETIME ONE


   Stop: 01/01/22 22:14


   Last Admin: 01/01/22 22:42 Dose:  125 mg


   Documented by: 


Methylprednisolone Sodium Succinate (Methylprednisolone Sodium Succinate 125 

Mg/2 Ml Sdv)  125 mg IVPUSH Q8H [facility]


   Last Admin: 01/07/22 06:12 Dose:  125 mg


   Documented by: 


Metolazone (Metolazone 2.5 Mg Tab)  2.5 mg PO ONETIME ONE


   Stop: 01/01/22 22:14


   Last Admin: 01/01/22 22:22 Dose:  2.5 mg


   Documented by: 


Metolazone (Metolazone 2.5 Mg Tab)  2.5 mg PO DAILY@0800 [facility]


   Last Admin: 01/03/22 08:52 Dose:  2.5 mg


   Documented by: 


Morphine Sulfate (Morphine 2 Mg/Ml Syringe)  2 mg IVPUSH ONETIME ONE


   Stop: 01/01/22 22:14


   Last Admin: 01/01/22 22:47 Dose:  2 mg


   Documented by: 


Morphine Sulfate (Morphine 2 Mg/Ml Syringe)  2 mg IVPUSH Q2H PRN


   PRN Reason: Pain


Non-Formulary Medication (Melatonin [Melatonin])  10 mg PO BEDTIME [facility]


Potassium Chloride (Potassium Chloride 20 Meq Tab.Er)  20 meq PO BID [facility]


   Last Admin: 01/05/22 08:40 Dose:  20 meq


   Documented by: 


Potassium Chloride (Potassium Chloride 20 Meq Tab.Er)  40 meq PO BID [facility]


   Last Admin: 01/05/22 21:10 Dose:  40 meq


   Documented by: 


Warfarin Sodium (Warfarin 2.5 Mg Tab)  2.5 mg PO 1600 [facility]


   Last Admin: 01/03/22 16:34 Dose:  2.5 mg


   Documented by: 


Warfarin Sodium (Warfarin 2.5 Mg Tab)  2.5 mg PO ONETIME ONE


   Stop: 01/06/22 16:01


   Last Admin: 01/06/22 15:47 Dose:  2.5 mg


   Documented by: 











- Exam


General: Alert, Oriented


Lungs: Clear to Auscultation, Normal Respiratory Effort


Cardiovascular: Regular Rate, Regular Rhythm, No Murmurs


Extremities: Pedal Edema (Left leg and right leg but much improved)





- Patient Data


Lab Results Last 24 hrs: 


                         Laboratory Results - last 24 hr











  01/08/22 01/08/22 01/08/22 Range/Units





  06:25 06:25 06:25 


 


WBC   11.2 H   (3.2-10.1)  x10-3/uL


 


RBC   3.87 L   (3.90-5.90)  x10(6)uL


 


Hgb   9.8 L   (12.9-17.7)  g/dL


 


Hct   31.3 L   (38.3-50.1)  %


 


MCV   80.9   (80.8-98.7)  fL


 


MCH   25.3 L   (27.0-33.3)  pg


 


MCHC   31.3   (28.7-35.3)  g/dL


 


RDW   18.1 H   (12.4-15.0)  %


 


Plt Count   198   (117-477)  x10(3)uL


 


MPV   8.4   (6.7-11.0)  fL


 


Add Manual Diff   Yes   


 


Neutrophils % (Manual)   87 H   (46-82)  %


 


Band Neutrophils %   4   (0-6)  %


 


Lymphocytes % (Manual)   4 L   (13-37)  %


 


Monocytes % (Manual)   5   (4-12)  %


 


PT  23.4 H    (9.0-11.1)  sec


 


INR  2.29 H    (1.00-1.24)  


 


Sodium    137  (135-145)  mmol/L


 


Potassium    4.5  D  (3.5-5.3)  mmol/L


 


Chloride    100  (100-110)  mmol/L


 


Carbon Dioxide    32  (21-32)  mmol/L


 


BUN    69 H  (7-18)  mg/dL


 


Creatinine    1.7 H  (0.70-1.30)  mg/dL


 


Est Cr Clr Drug Dosing    28.62  mL/min


 


Estimated GFR (MDRD)    38 L  (>60)  


 


BUN/Creatinine Ratio    40.6 H  (9-20)  


 


Glucose    107  ()  mg/dL


 


Calcium    8.8  (8.6-10.2)  mg/dL


 


NT-Pro-B Natriuret Pep     (<=450)  pg/mL














  01/08/22 Range/Units





  06:25 


 


WBC   (3.2-10.1)  x10-3/uL


 


RBC   (3.90-5.90)  x10(6)uL


 


Hgb   (12.9-17.7)  g/dL


 


Hct   (38.3-50.1)  %


 


MCV   (80.8-98.7)  fL


 


MCH   (27.0-33.3)  pg


 


MCHC   (28.7-35.3)  g/dL


 


RDW   (12.4-15.0)  %


 


Plt Count   (117-477)  x10(3)uL


 


MPV   (6.7-11.0)  fL


 


Add Manual Diff   


 


Neutrophils % (Manual)   (46-82)  %


 


Band Neutrophils %   (0-6)  %


 


Lymphocytes % (Manual)   (13-37)  %


 


Monocytes % (Manual)   (4-12)  %


 


PT   (9.0-11.1)  sec


 


INR   (1.00-1.24)  


 


Sodium   (135-145)  mmol/L


 


Potassium   (3.5-5.3)  mmol/L


 


Chloride   (100-110)  mmol/L


 


Carbon Dioxide   (21-32)  mmol/L


 


BUN   (7-18)  mg/dL


 


Creatinine   (0.70-1.30)  mg/dL


 


Est Cr Clr Drug Dosing   mL/min


 


Estimated GFR (MDRD)   (>60)  


 


BUN/Creatinine Ratio   (9-20)  


 


Glucose   ()  mg/dL


 


Calcium   (8.6-10.2)  mg/dL


 


NT-Pro-B Natriuret Pep  68768 H*  (<=450)  pg/mL











Result Diagrams: 


                                 01/08/22 06:25





                                 01/08/22 06:25





Sepsis Event Note





- Evaluation


Sepsis Screening Result: No Definite Risk





- Focused Exam


Vital Signs: 


                                   Vital Signs











  Temp Pulse Resp BP Pulse Ox


 


 01/08/22 01:00  97.9 F  68  16  118/57 L  95














- Problem List & Annotations


(1) Coronary artery disease


SNOMED Code(s): 83807629


   Code(s): I25.10 - ATHSCL HEART DISEASE OF NATIVE CORONARY ARTERY W/O ANG 

PCTRS   Status: Acute   Current Visit: Yes   





(2) CAITY (acute kidney injury)


SNOMED Code(s): 02763966, 77779481


   Code(s): N17.9 - ACUTE KIDNEY FAILURE, UNSPECIFIED   Status: Acute   Current 

Visit: Yes   





(3) Afib


SNOMED Code(s): 49657915


   Code(s): I48.91 - UNSPECIFIED ATRIAL FIBRILLATION   Status: Acute   Current 

Visit: Yes   


Qualifiers: 


   Atrial fibrillation type: paroxysmal   Qualified Code(s): I48.0 - Paroxysmal 

atrial fibrillation   





(4) CHF exacerbation


SNOMED Code(s): 279376823, 92317157247768


   Code(s): I50.9 - HEART FAILURE, UNSPECIFIED   Status: Acute   Current Visit: 

Yes   


Qualifiers: 


   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 

(congestive) heart failure   





(5) CKD (chronic kidney disease)


SNOMED Code(s): 017007997


   Code(s): N18.9 - CHRONIC KIDNEY DISEASE, UNSPECIFIED   Status: Chronic   

Current Visit: Yes   


Qualifiers: 


   Chronic kidney disease stage: stage 3 (moderate) 





(6) COPD (chronic obstructive pulmonary disease)


SNOMED Code(s): 51122788


   Code(s): J44.9 - CHRONIC OBSTRUCTIVE PULMONARY DISEASE, UNSPECIFIED   Status:

Chronic   Current Visit: Yes   


Qualifiers: 


   Chronic bronchitis type: unspecified 





(7) Elevated troponin


SNOMED Code(s): 453050207, 471692144, 471270698


   Code(s): R77.8 - OTHER SPECIFIED ABNORMALITIES OF PLASMA PROTEINS   Status: 

Acute   Current Visit: No   





(8) Encounter for palliative care


SNOMED Code(s): 373852263, 635109541


   Code(s): Z51.5 - ENCOUNTER FOR PALLIATIVE CARE   Status: Acute   Current 

Visit: No   





(9) Non-STEMI (non-ST elevated myocardial infarction)


SNOMED Code(s): 64854112


   Code(s): I21.4 - NON-ST ELEVATION (NSTEMI) MYOCARDIAL INFARCTION   Status: 

Acute   Current Visit: No   Annotation/Comment:: Mr Manriquez will be transferred

to [facility] by ground ambulance tonight.    





(10) HTN (hypertension)


SNOMED Code(s): 33708037


   Code(s): I10 - ESSENTIAL (PRIMARY) HYPERTENSION   Status: Chronic   Current 

Visit: No   


Qualifiers: 


   Hypertension type: primary hypertension   Qualified Code(s): I10 - Essential 

(primary) hypertension   





- Problem List Review


Problem List Initiated/Reviewed/Updated: Yes





- My Orders


Last 24 Hours: 


My Active Orders





01/07/22 10:05


MARIBELL Hose [Antiembolic Hose] [OM.PC] Routine 





01/07/22 Lunch


Low Salt [Sodium Restricted Diet] [DIET] 





01/07/22 14:00


Bumetanide [Bumex]   2 mg PO BIDDIURETIC 





01/07/22 16:00


Warfarin [Coumadin]   1.25 mg PO MoFr@1600 





01/08/22 08:00


predniSONE   60 mg PO WITHBREAKFAST 





01/08/22 16:00


Warfarin [Coumadin]   2.5 mg PO Daria@1600 





01/08/22 21:00


Potassium Chloride [Klor-Con M20]   40 meq PO BID 














- Plan


Plan:: 


1. Change potassium to twice daily


2. Continue current care.


3. It is the weekend and whenever we can transfer back to the [place] home and 

they can take him I believe he should be able to go.

## 2022-02-14 ENCOUNTER — HOSPITAL ENCOUNTER (EMERGENCY)
Dept: HOSPITAL 7 - FB.ED | Age: 87
Discharge: HOME | End: 2022-02-14
Payer: MEDICARE

## 2022-02-14 VITALS — SYSTOLIC BLOOD PRESSURE: 124 MMHG | DIASTOLIC BLOOD PRESSURE: 55 MMHG | HEART RATE: 60 BPM

## 2022-02-14 DIAGNOSIS — Z88.0: ICD-10-CM

## 2022-02-14 DIAGNOSIS — I11.0: ICD-10-CM

## 2022-02-14 DIAGNOSIS — Z79.899: ICD-10-CM

## 2022-02-14 DIAGNOSIS — Z79.01: ICD-10-CM

## 2022-02-14 DIAGNOSIS — I25.10: ICD-10-CM

## 2022-02-14 DIAGNOSIS — E87.1: ICD-10-CM

## 2022-02-14 DIAGNOSIS — Z88.8: ICD-10-CM

## 2022-02-14 DIAGNOSIS — I50.9: ICD-10-CM

## 2022-02-14 DIAGNOSIS — Z95.1: ICD-10-CM

## 2022-02-14 DIAGNOSIS — J44.9: ICD-10-CM

## 2022-02-14 DIAGNOSIS — E87.5: Primary | ICD-10-CM

## 2022-06-22 ENCOUNTER — HOSPITAL ENCOUNTER (OUTPATIENT)
Dept: HOSPITAL 7 - FB.MS | Age: 87
Setting detail: OBSERVATION
LOS: 1 days | Discharge: HOME HEALTH | End: 2022-06-23
Attending: FAMILY MEDICINE | Admitting: FAMILY MEDICINE
Payer: MEDICARE

## 2022-06-22 DIAGNOSIS — I50.42: ICD-10-CM

## 2022-06-22 DIAGNOSIS — T43.211A: ICD-10-CM

## 2022-06-22 DIAGNOSIS — I95.2: ICD-10-CM

## 2022-06-22 DIAGNOSIS — Z87.891: ICD-10-CM

## 2022-06-22 DIAGNOSIS — R00.1: ICD-10-CM

## 2022-06-22 DIAGNOSIS — Z90.49: ICD-10-CM

## 2022-06-22 DIAGNOSIS — J44.9: ICD-10-CM

## 2022-06-22 DIAGNOSIS — Z88.1: ICD-10-CM

## 2022-06-22 DIAGNOSIS — Z98.890: ICD-10-CM

## 2022-06-22 DIAGNOSIS — T44.7X1A: Primary | ICD-10-CM

## 2022-06-22 DIAGNOSIS — I13.0: ICD-10-CM

## 2022-06-22 DIAGNOSIS — T39.1X1A: ICD-10-CM

## 2022-06-22 DIAGNOSIS — Z79.899: ICD-10-CM

## 2022-06-22 DIAGNOSIS — Z95.0: ICD-10-CM

## 2022-06-22 DIAGNOSIS — Z88.8: ICD-10-CM

## 2022-06-22 DIAGNOSIS — I25.2: ICD-10-CM

## 2022-06-22 DIAGNOSIS — N18.32: ICD-10-CM

## 2022-06-22 PROCEDURE — G0378 HOSPITAL OBSERVATION PER HR: HCPCS

## 2022-06-22 PROCEDURE — G0379 DIRECT REFER HOSPITAL OBSERV: HCPCS

## 2022-06-23 VITALS — SYSTOLIC BLOOD PRESSURE: 112 MMHG | DIASTOLIC BLOOD PRESSURE: 59 MMHG | HEART RATE: 69 BPM

## 2022-08-15 ENCOUNTER — HOSPITAL ENCOUNTER (OUTPATIENT)
Dept: HOSPITAL 7 - FB.ED | Age: 87
Setting detail: OBSERVATION
LOS: 1 days | Discharge: HOME | End: 2022-08-16
Attending: FAMILY MEDICINE | Admitting: FAMILY MEDICINE
Payer: MEDICARE

## 2022-08-15 DIAGNOSIS — E78.2: ICD-10-CM

## 2022-08-15 DIAGNOSIS — Z79.899: ICD-10-CM

## 2022-08-15 DIAGNOSIS — I25.10: ICD-10-CM

## 2022-08-15 DIAGNOSIS — Z90.49: ICD-10-CM

## 2022-08-15 DIAGNOSIS — Z96.643: ICD-10-CM

## 2022-08-15 DIAGNOSIS — M19.90: ICD-10-CM

## 2022-08-15 DIAGNOSIS — Z79.811: ICD-10-CM

## 2022-08-15 DIAGNOSIS — I48.0: ICD-10-CM

## 2022-08-15 DIAGNOSIS — Z87.891: ICD-10-CM

## 2022-08-15 DIAGNOSIS — I82.409: ICD-10-CM

## 2022-08-15 DIAGNOSIS — Z79.2: ICD-10-CM

## 2022-08-15 DIAGNOSIS — Z88.8: ICD-10-CM

## 2022-08-15 DIAGNOSIS — R77.8: ICD-10-CM

## 2022-08-15 DIAGNOSIS — I50.9: ICD-10-CM

## 2022-08-15 DIAGNOSIS — I43: ICD-10-CM

## 2022-08-15 DIAGNOSIS — I11.0: ICD-10-CM

## 2022-08-15 DIAGNOSIS — Z98.890: ICD-10-CM

## 2022-08-15 DIAGNOSIS — R53.1: ICD-10-CM

## 2022-08-15 DIAGNOSIS — R41.0: ICD-10-CM

## 2022-08-15 DIAGNOSIS — Z88.1: ICD-10-CM

## 2022-08-15 DIAGNOSIS — E86.0: Primary | ICD-10-CM

## 2022-08-15 DIAGNOSIS — N17.9: ICD-10-CM

## 2022-08-15 DIAGNOSIS — Z79.01: ICD-10-CM

## 2022-08-15 DIAGNOSIS — Z88.0: ICD-10-CM

## 2022-08-15 DIAGNOSIS — Z95.1: ICD-10-CM

## 2022-08-15 PROCEDURE — 96361 HYDRATE IV INFUSION ADD-ON: CPT

## 2022-08-15 PROCEDURE — 99285 EMERGENCY DEPT VISIT HI MDM: CPT

## 2022-08-15 PROCEDURE — 85610 PROTHROMBIN TIME: CPT

## 2022-08-15 PROCEDURE — 96360 HYDRATION IV INFUSION INIT: CPT

## 2022-08-15 PROCEDURE — G0378 HOSPITAL OBSERVATION PER HR: HCPCS

## 2022-08-15 PROCEDURE — 80048 BASIC METABOLIC PNL TOTAL CA: CPT

## 2022-08-15 PROCEDURE — 36415 COLL VENOUS BLD VENIPUNCTURE: CPT

## 2022-08-15 PROCEDURE — 84484 ASSAY OF TROPONIN QUANT: CPT

## 2022-08-15 PROCEDURE — 99284 EMERGENCY DEPT VISIT MOD MDM: CPT

## 2022-08-15 PROCEDURE — 85025 COMPLETE CBC W/AUTO DIFF WBC: CPT

## 2022-08-15 PROCEDURE — 82565 ASSAY OF CREATININE: CPT

## 2022-08-15 PROCEDURE — 83880 ASSAY OF NATRIURETIC PEPTIDE: CPT

## 2022-08-15 PROCEDURE — 81001 URINALYSIS AUTO W/SCOPE: CPT

## 2022-08-15 RX ADMIN — ACETAMINOPHEN SCH MG: 500 TABLET, FILM COATED ORAL at 20:07

## 2022-08-15 RX ADMIN — CARVEDILOL SCH MG: 3.12 TABLET, FILM COATED ORAL at 20:09

## 2022-08-16 VITALS — SYSTOLIC BLOOD PRESSURE: 126 MMHG | DIASTOLIC BLOOD PRESSURE: 54 MMHG

## 2022-08-16 VITALS — HEART RATE: 65 BPM

## 2022-08-16 RX ADMIN — CARVEDILOL SCH MG: 3.12 TABLET, FILM COATED ORAL at 09:56

## 2022-08-16 RX ADMIN — ACETAMINOPHEN SCH MG: 500 TABLET, FILM COATED ORAL at 09:58

## 2022-09-04 ENCOUNTER — HOSPITAL ENCOUNTER (EMERGENCY)
Dept: HOSPITAL 7 - FB.ED | Age: 87
Discharge: HOME | End: 2022-09-04
Payer: MEDICARE

## 2022-09-04 VITALS — DIASTOLIC BLOOD PRESSURE: 56 MMHG | SYSTOLIC BLOOD PRESSURE: 111 MMHG | HEART RATE: 60 BPM

## 2022-09-04 DIAGNOSIS — Z88.1: ICD-10-CM

## 2022-09-04 DIAGNOSIS — Z90.49: ICD-10-CM

## 2022-09-04 DIAGNOSIS — Z79.01: ICD-10-CM

## 2022-09-04 DIAGNOSIS — Z79.899: ICD-10-CM

## 2022-09-04 DIAGNOSIS — I11.0: ICD-10-CM

## 2022-09-04 DIAGNOSIS — J44.9: ICD-10-CM

## 2022-09-04 DIAGNOSIS — M25.512: ICD-10-CM

## 2022-09-04 DIAGNOSIS — I25.2: ICD-10-CM

## 2022-09-04 DIAGNOSIS — I50.9: ICD-10-CM

## 2022-09-04 DIAGNOSIS — I25.10: ICD-10-CM

## 2022-09-04 DIAGNOSIS — Z88.8: ICD-10-CM

## 2022-09-04 DIAGNOSIS — M25.511: ICD-10-CM

## 2022-09-04 DIAGNOSIS — G89.29: Primary | ICD-10-CM

## 2022-09-14 ENCOUNTER — HOSPITAL ENCOUNTER (EMERGENCY)
Dept: HOSPITAL 7 - FB.ED | Age: 87
Discharge: HOME | End: 2022-09-14
Payer: MEDICARE

## 2022-09-14 VITALS — SYSTOLIC BLOOD PRESSURE: 120 MMHG | HEART RATE: 61 BPM | DIASTOLIC BLOOD PRESSURE: 63 MMHG

## 2022-09-14 DIAGNOSIS — I25.2: ICD-10-CM

## 2022-09-14 DIAGNOSIS — Z79.899: ICD-10-CM

## 2022-09-14 DIAGNOSIS — I25.10: ICD-10-CM

## 2022-09-14 DIAGNOSIS — Z88.8: ICD-10-CM

## 2022-09-14 DIAGNOSIS — I11.0: ICD-10-CM

## 2022-09-14 DIAGNOSIS — E87.6: Primary | ICD-10-CM

## 2022-09-14 DIAGNOSIS — Z79.01: ICD-10-CM

## 2022-09-14 DIAGNOSIS — Z90.49: ICD-10-CM

## 2022-09-14 DIAGNOSIS — J44.9: ICD-10-CM

## 2022-09-14 DIAGNOSIS — Z88.1: ICD-10-CM

## 2022-09-14 DIAGNOSIS — I50.9: ICD-10-CM

## 2022-09-14 DIAGNOSIS — E87.1: ICD-10-CM

## 2022-09-14 PROCEDURE — 83735 ASSAY OF MAGNESIUM: CPT

## 2022-09-14 PROCEDURE — 99284 EMERGENCY DEPT VISIT MOD MDM: CPT

## 2022-09-14 PROCEDURE — 80048 BASIC METABOLIC PNL TOTAL CA: CPT

## 2022-09-14 PROCEDURE — 36415 COLL VENOUS BLD VENIPUNCTURE: CPT

## 2022-09-23 ENCOUNTER — HOSPITAL ENCOUNTER (EMERGENCY)
Dept: HOSPITAL 7 - FB.ED | Age: 87
Discharge: NURSING, REHAB OR LONG-TERM CARE | End: 2022-09-23
Payer: MEDICARE

## 2022-09-23 VITALS — SYSTOLIC BLOOD PRESSURE: 110 MMHG | HEART RATE: 62 BPM | DIASTOLIC BLOOD PRESSURE: 61 MMHG

## 2022-09-23 DIAGNOSIS — J44.9: ICD-10-CM

## 2022-09-23 DIAGNOSIS — I11.0: Primary | ICD-10-CM

## 2022-09-23 DIAGNOSIS — Z86.16: ICD-10-CM

## 2022-09-23 DIAGNOSIS — I25.10: ICD-10-CM

## 2022-09-23 DIAGNOSIS — E87.6: ICD-10-CM

## 2022-09-23 DIAGNOSIS — E87.1: ICD-10-CM

## 2022-09-23 DIAGNOSIS — Z79.899: ICD-10-CM

## 2022-09-23 DIAGNOSIS — I50.42: ICD-10-CM

## 2022-09-23 DIAGNOSIS — Z90.49: ICD-10-CM

## 2022-09-23 DIAGNOSIS — N17.9: ICD-10-CM

## 2022-09-23 DIAGNOSIS — Z79.01: ICD-10-CM

## 2022-09-23 DIAGNOSIS — I24.9: ICD-10-CM

## 2022-09-23 DIAGNOSIS — Z88.8: ICD-10-CM

## 2022-09-23 PROCEDURE — 93005 ELECTROCARDIOGRAM TRACING: CPT

## 2022-09-23 PROCEDURE — 83880 ASSAY OF NATRIURETIC PEPTIDE: CPT

## 2022-09-23 PROCEDURE — 36415 COLL VENOUS BLD VENIPUNCTURE: CPT

## 2022-09-23 PROCEDURE — 85025 COMPLETE CBC W/AUTO DIFF WBC: CPT

## 2022-09-23 PROCEDURE — 85610 PROTHROMBIN TIME: CPT

## 2022-09-23 PROCEDURE — 93010 ELECTROCARDIOGRAM REPORT: CPT

## 2022-09-23 PROCEDURE — 71045 X-RAY EXAM CHEST 1 VIEW: CPT

## 2022-09-23 PROCEDURE — 80053 COMPREHEN METABOLIC PANEL: CPT

## 2022-09-23 PROCEDURE — 99285 EMERGENCY DEPT VISIT HI MDM: CPT

## 2022-09-23 PROCEDURE — 84484 ASSAY OF TROPONIN QUANT: CPT

## 2022-10-04 ENCOUNTER — HOSPITAL ENCOUNTER (INPATIENT)
Dept: HOSPITAL 7 - FB.MS | Age: 87
LOS: 14 days | Discharge: STILL ADMITTED OR ADMINISTRATIVE | DRG: 948 | End: 2022-10-18
Attending: FAMILY MEDICINE | Admitting: FAMILY MEDICINE
Payer: MEDICARE

## 2022-10-04 DIAGNOSIS — I13.0: ICD-10-CM

## 2022-10-04 DIAGNOSIS — I50.9: ICD-10-CM

## 2022-10-04 DIAGNOSIS — Z96.649: ICD-10-CM

## 2022-10-04 DIAGNOSIS — Z79.01: ICD-10-CM

## 2022-10-04 DIAGNOSIS — E78.5: ICD-10-CM

## 2022-10-04 DIAGNOSIS — E78.00: ICD-10-CM

## 2022-10-04 DIAGNOSIS — J44.9: ICD-10-CM

## 2022-10-04 DIAGNOSIS — R77.8: ICD-10-CM

## 2022-10-04 DIAGNOSIS — Z95.1: ICD-10-CM

## 2022-10-04 DIAGNOSIS — N18.30: ICD-10-CM

## 2022-10-04 DIAGNOSIS — Z79.899: ICD-10-CM

## 2022-10-04 DIAGNOSIS — I48.0: ICD-10-CM

## 2022-10-04 DIAGNOSIS — Z86.718: ICD-10-CM

## 2022-10-04 DIAGNOSIS — R53.81: Primary | ICD-10-CM

## 2022-10-06 VITALS — DIASTOLIC BLOOD PRESSURE: 56 MMHG | SYSTOLIC BLOOD PRESSURE: 104 MMHG

## 2022-10-06 VITALS — HEART RATE: 60 BPM

## 2022-11-25 ENCOUNTER — HOSPITAL ENCOUNTER (EMERGENCY)
Dept: HOSPITAL 7 - FB.ED | Age: 87
Discharge: HOME | End: 2022-11-25
Payer: MEDICARE

## 2022-11-25 VITALS — DIASTOLIC BLOOD PRESSURE: 59 MMHG | SYSTOLIC BLOOD PRESSURE: 115 MMHG | HEART RATE: 70 BPM

## 2022-11-25 DIAGNOSIS — Z79.899: ICD-10-CM

## 2022-11-25 DIAGNOSIS — J44.9: ICD-10-CM

## 2022-11-25 DIAGNOSIS — I50.9: ICD-10-CM

## 2022-11-25 DIAGNOSIS — E87.70: Primary | ICD-10-CM

## 2022-11-25 DIAGNOSIS — I25.810: ICD-10-CM

## 2022-11-25 DIAGNOSIS — Z79.01: ICD-10-CM

## 2022-11-25 DIAGNOSIS — M19.90: ICD-10-CM

## 2022-11-25 DIAGNOSIS — I11.0: ICD-10-CM

## 2022-11-25 DIAGNOSIS — I25.10: ICD-10-CM

## 2022-11-25 DIAGNOSIS — Z88.1: ICD-10-CM

## 2022-11-25 DIAGNOSIS — I25.2: ICD-10-CM

## 2022-11-25 DIAGNOSIS — Z88.8: ICD-10-CM

## 2022-11-25 PROCEDURE — 80048 BASIC METABOLIC PNL TOTAL CA: CPT

## 2022-11-25 PROCEDURE — 36415 COLL VENOUS BLD VENIPUNCTURE: CPT

## 2022-11-25 PROCEDURE — 96374 THER/PROPH/DIAG INJ IV PUSH: CPT

## 2022-11-25 PROCEDURE — 99283 EMERGENCY DEPT VISIT LOW MDM: CPT

## 2022-11-28 ENCOUNTER — HOSPITAL ENCOUNTER (EMERGENCY)
Dept: HOSPITAL 7 - FB.ED | Age: 87
LOS: 1 days | Discharge: NURSING, REHAB OR LONG-TERM CARE | End: 2022-11-29
Payer: MEDICARE

## 2022-11-28 DIAGNOSIS — Z79.899: ICD-10-CM

## 2022-11-28 DIAGNOSIS — Z88.8: ICD-10-CM

## 2022-11-28 DIAGNOSIS — I50.40: ICD-10-CM

## 2022-11-28 DIAGNOSIS — R79.89: ICD-10-CM

## 2022-11-28 DIAGNOSIS — N18.30: ICD-10-CM

## 2022-11-28 DIAGNOSIS — E78.00: ICD-10-CM

## 2022-11-28 DIAGNOSIS — Z79.01: ICD-10-CM

## 2022-11-28 DIAGNOSIS — Z88.1: ICD-10-CM

## 2022-11-28 DIAGNOSIS — I25.10: ICD-10-CM

## 2022-11-28 DIAGNOSIS — M19.90: ICD-10-CM

## 2022-11-28 DIAGNOSIS — N39.0: ICD-10-CM

## 2022-11-28 DIAGNOSIS — Z20.822: ICD-10-CM

## 2022-11-28 DIAGNOSIS — I25.2: ICD-10-CM

## 2022-11-28 DIAGNOSIS — I13.0: Primary | ICD-10-CM

## 2022-11-28 PROCEDURE — 87086 URINE CULTURE/COLONY COUNT: CPT

## 2022-11-28 PROCEDURE — 96375 TX/PRO/DX INJ NEW DRUG ADDON: CPT

## 2022-11-28 PROCEDURE — 80053 COMPREHEN METABOLIC PANEL: CPT

## 2022-11-28 PROCEDURE — 87088 URINE BACTERIA CULTURE: CPT

## 2022-11-28 PROCEDURE — 85025 COMPLETE CBC W/AUTO DIFF WBC: CPT

## 2022-11-28 PROCEDURE — 85610 PROTHROMBIN TIME: CPT

## 2022-11-28 PROCEDURE — 99285 EMERGENCY DEPT VISIT HI MDM: CPT

## 2022-11-28 PROCEDURE — 83880 ASSAY OF NATRIURETIC PEPTIDE: CPT

## 2022-11-28 PROCEDURE — 85730 THROMBOPLASTIN TIME PARTIAL: CPT

## 2022-11-28 PROCEDURE — 93005 ELECTROCARDIOGRAM TRACING: CPT

## 2022-11-28 PROCEDURE — 84484 ASSAY OF TROPONIN QUANT: CPT

## 2022-11-28 PROCEDURE — 87186 SC STD MICRODIL/AGAR DIL: CPT

## 2022-11-28 PROCEDURE — 96365 THER/PROPH/DIAG IV INF INIT: CPT

## 2022-11-28 PROCEDURE — U0002 COVID-19 LAB TEST NON-CDC: HCPCS

## 2022-11-28 PROCEDURE — 96376 TX/PRO/DX INJ SAME DRUG ADON: CPT

## 2022-11-28 PROCEDURE — 81001 URINALYSIS AUTO W/SCOPE: CPT

## 2022-11-28 PROCEDURE — 71045 X-RAY EXAM CHEST 1 VIEW: CPT

## 2022-11-28 PROCEDURE — 36415 COLL VENOUS BLD VENIPUNCTURE: CPT

## 2022-11-28 RX ADMIN — Medication PRN ML: at 12:51

## 2022-11-28 RX ADMIN — Medication PRN ML: at 13:57

## 2022-11-29 VITALS — DIASTOLIC BLOOD PRESSURE: 61 MMHG | HEART RATE: 61 BPM | SYSTOLIC BLOOD PRESSURE: 118 MMHG

## 2022-11-29 RX ADMIN — Medication PRN ML: at 09:00

## 2022-11-29 RX ADMIN — Medication PRN ML: at 12:46

## 2023-01-04 ENCOUNTER — HOSPITAL ENCOUNTER (EMERGENCY)
Dept: HOSPITAL 7 - FB.ED | Age: 88
Discharge: HOME | End: 2023-01-04
Payer: MEDICARE

## 2023-01-04 VITALS — HEART RATE: 73 BPM | SYSTOLIC BLOOD PRESSURE: 108 MMHG | DIASTOLIC BLOOD PRESSURE: 60 MMHG

## 2023-01-04 DIAGNOSIS — Z88.8: ICD-10-CM

## 2023-01-04 DIAGNOSIS — R79.89: ICD-10-CM

## 2023-01-04 DIAGNOSIS — Z79.899: ICD-10-CM

## 2023-01-04 DIAGNOSIS — I11.0: ICD-10-CM

## 2023-01-04 DIAGNOSIS — Z79.01: ICD-10-CM

## 2023-01-04 DIAGNOSIS — Z20.822: ICD-10-CM

## 2023-01-04 DIAGNOSIS — Z95.0: ICD-10-CM

## 2023-01-04 DIAGNOSIS — I25.2: ICD-10-CM

## 2023-01-04 DIAGNOSIS — E87.6: ICD-10-CM

## 2023-01-04 DIAGNOSIS — I25.10: ICD-10-CM

## 2023-01-04 DIAGNOSIS — E78.00: ICD-10-CM

## 2023-01-04 DIAGNOSIS — Z88.0: ICD-10-CM

## 2023-01-04 DIAGNOSIS — E86.0: Primary | ICD-10-CM

## 2023-01-04 DIAGNOSIS — I50.9: ICD-10-CM

## 2023-01-04 LAB — SARS-COV-2 RNA RESP QL NAA+PROBE: NEGATIVE

## 2023-03-16 ENCOUNTER — HOSPITAL ENCOUNTER (INPATIENT)
Dept: HOSPITAL 7 - FB.MS | Age: 88
LOS: 2 days | Discharge: HOME HEALTH | DRG: 699 | End: 2023-03-18
Attending: FAMILY MEDICINE | Admitting: FAMILY MEDICINE
Payer: MEDICARE

## 2023-03-16 DIAGNOSIS — N18.32: ICD-10-CM

## 2023-03-16 DIAGNOSIS — Z90.49: ICD-10-CM

## 2023-03-16 DIAGNOSIS — I50.42: ICD-10-CM

## 2023-03-16 DIAGNOSIS — Z51.5: ICD-10-CM

## 2023-03-16 DIAGNOSIS — J44.9: ICD-10-CM

## 2023-03-16 DIAGNOSIS — Z88.8: ICD-10-CM

## 2023-03-16 DIAGNOSIS — I25.2: ICD-10-CM

## 2023-03-16 DIAGNOSIS — E78.2: ICD-10-CM

## 2023-03-16 DIAGNOSIS — Z95.1: ICD-10-CM

## 2023-03-16 DIAGNOSIS — T83.511A: Primary | ICD-10-CM

## 2023-03-16 DIAGNOSIS — Z86.718: ICD-10-CM

## 2023-03-16 DIAGNOSIS — Z79.01: ICD-10-CM

## 2023-03-16 DIAGNOSIS — I25.10: ICD-10-CM

## 2023-03-16 DIAGNOSIS — Z79.899: ICD-10-CM

## 2023-03-16 DIAGNOSIS — N39.0: ICD-10-CM

## 2023-03-16 DIAGNOSIS — Z90.89: ICD-10-CM
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
## 2023-03-16 DIAGNOSIS — Z96.643: ICD-10-CM

## 2023-03-16 DIAGNOSIS — Z66: ICD-10-CM

## 2023-03-16 DIAGNOSIS — M19.90: ICD-10-CM

## 2023-03-16 DIAGNOSIS — Z98.890: ICD-10-CM

## 2023-03-16 DIAGNOSIS — Z95.0: ICD-10-CM

## 2023-03-16 DIAGNOSIS — Z98.49: ICD-10-CM

## 2023-03-16 DIAGNOSIS — I13.0: ICD-10-CM

## 2023-03-16 DIAGNOSIS — E87.1: ICD-10-CM

## 2023-03-16 DIAGNOSIS — F03.B0: ICD-10-CM

## 2023-03-16 DIAGNOSIS — Z79.82: ICD-10-CM

## 2023-03-16 DIAGNOSIS — I48.0: ICD-10-CM

## 2023-03-16 DIAGNOSIS — E86.0: ICD-10-CM

## 2023-03-16 DIAGNOSIS — F03.90: ICD-10-CM

## 2023-03-16 PROCEDURE — U0002 COVID-19 LAB TEST NON-CDC: HCPCS

## 2023-03-16 RX ADMIN — POTASSIUM CHLORIDE SCH MEQ: 1500 TABLET, EXTENDED RELEASE ORAL at 20:23

## 2023-03-17 RX ADMIN — POTASSIUM CHLORIDE SCH MEQ: 1500 TABLET, EXTENDED RELEASE ORAL at 08:38

## 2023-03-17 RX ADMIN — POTASSIUM CHLORIDE SCH MEQ: 1500 TABLET, EXTENDED RELEASE ORAL at 20:13

## 2023-03-17 RX ADMIN — TIOTROPIUM BROMIDE INHALATION SPRAY SCH PUFF: 3.12 SPRAY, METERED RESPIRATORY (INHALATION) at 09:41

## 2023-03-17 RX ADMIN — POTASSIUM CHLORIDE SCH MEQ: 1500 TABLET, EXTENDED RELEASE ORAL at 14:22

## 2023-03-18 VITALS — HEART RATE: 65 BPM | DIASTOLIC BLOOD PRESSURE: 64 MMHG | SYSTOLIC BLOOD PRESSURE: 108 MMHG

## 2023-03-18 RX ADMIN — TIOTROPIUM BROMIDE INHALATION SPRAY SCH PUFF: 3.12 SPRAY, METERED RESPIRATORY (INHALATION) at 08:35

## 2023-03-18 RX ADMIN — POTASSIUM CHLORIDE SCH MEQ: 1500 TABLET, EXTENDED RELEASE ORAL at 08:34
